# Patient Record
Sex: FEMALE | Race: OTHER | Employment: FULL TIME | URBAN - METROPOLITAN AREA
[De-identification: names, ages, dates, MRNs, and addresses within clinical notes are randomized per-mention and may not be internally consistent; named-entity substitution may affect disease eponyms.]

---

## 2017-04-07 ENCOUNTER — HOSPITAL ENCOUNTER (OUTPATIENT)
Dept: ULTRASOUND IMAGING | Age: 42
Discharge: HOME | End: 2017-04-07

## 2017-04-07 DIAGNOSIS — K76.0 FATTY LIVER: ICD-10-CM

## 2017-04-07 PROCEDURE — 76705 ECHO EXAM OF ABDOMEN: CPT

## 2018-02-17 ENCOUNTER — HOSPITAL ENCOUNTER (OUTPATIENT)
Dept: WOMENS IMAGING | Age: 43
Discharge: HOME | End: 2018-02-19
Payer: COMMERCIAL

## 2018-02-17 DIAGNOSIS — Z12.31 ENCOUNTER FOR SCREENING MAMMOGRAM FOR BREAST CANCER: ICD-10-CM

## 2018-02-17 PROCEDURE — 77067 SCR MAMMO BI INCL CAD: CPT

## 2018-02-24 LAB — DIABETIC RETINOPATHY: NORMAL

## 2018-02-26 ENCOUNTER — OFFICE VISIT (OUTPATIENT)
Dept: FAMILY MEDICINE CLINIC | Age: 43
End: 2018-02-26
Payer: COMMERCIAL

## 2018-02-26 VITALS
TEMPERATURE: 97.6 F | OXYGEN SATURATION: 98 % | WEIGHT: 266 LBS | HEIGHT: 64 IN | HEART RATE: 76 BPM | SYSTOLIC BLOOD PRESSURE: 136 MMHG | DIASTOLIC BLOOD PRESSURE: 82 MMHG | BODY MASS INDEX: 45.41 KG/M2 | RESPIRATION RATE: 14 BRPM

## 2018-02-26 DIAGNOSIS — E55.9 VITAMIN D DEFICIENCY: Chronic | ICD-10-CM

## 2018-02-26 DIAGNOSIS — Z23 NEED FOR STREPTOCOCCUS PNEUMONIAE AND INFLUENZA VACCINATION: ICD-10-CM

## 2018-02-26 DIAGNOSIS — Z79.899 HIGH RISK MEDICATION USE: Chronic | ICD-10-CM

## 2018-02-26 DIAGNOSIS — E11.9 DIABETIC EYE EXAM (HCC): ICD-10-CM

## 2018-02-26 DIAGNOSIS — E78.00 PURE HYPERCHOLESTEROLEMIA: ICD-10-CM

## 2018-02-26 DIAGNOSIS — G47.33 OBSTRUCTIVE SLEEP APNEA SYNDROME: ICD-10-CM

## 2018-02-26 DIAGNOSIS — Z11.4 SCREENING FOR HIV (HUMAN IMMUNODEFICIENCY VIRUS): ICD-10-CM

## 2018-02-26 DIAGNOSIS — E11.9 ENCOUNTER FOR DIABETIC FOOT EXAM (HCC): ICD-10-CM

## 2018-02-26 DIAGNOSIS — E66.01 MORBID OBESITY DUE TO EXCESS CALORIES (HCC): ICD-10-CM

## 2018-02-26 DIAGNOSIS — Z01.00 DIABETIC EYE EXAM (HCC): ICD-10-CM

## 2018-02-26 PROBLEM — E78.5 HYPERLIPIDEMIA: Status: ACTIVE | Noted: 2018-02-26

## 2018-02-26 PROCEDURE — G8427 DOCREV CUR MEDS BY ELIG CLIN: HCPCS | Performed by: FAMILY MEDICINE

## 2018-02-26 PROCEDURE — 3046F HEMOGLOBIN A1C LEVEL >9.0%: CPT | Performed by: FAMILY MEDICINE

## 2018-02-26 PROCEDURE — 93000 ELECTROCARDIOGRAM COMPLETE: CPT | Performed by: FAMILY MEDICINE

## 2018-02-26 PROCEDURE — G8417 CALC BMI ABV UP PARAM F/U: HCPCS | Performed by: FAMILY MEDICINE

## 2018-02-26 PROCEDURE — 1036F TOBACCO NON-USER: CPT | Performed by: FAMILY MEDICINE

## 2018-02-26 PROCEDURE — 90471 IMMUNIZATION ADMIN: CPT | Performed by: FAMILY MEDICINE

## 2018-02-26 PROCEDURE — 90688 IIV4 VACCINE SPLT 0.5 ML IM: CPT | Performed by: FAMILY MEDICINE

## 2018-02-26 PROCEDURE — 99204 OFFICE O/P NEW MOD 45 MIN: CPT | Performed by: FAMILY MEDICINE

## 2018-02-26 PROCEDURE — 90732 PPSV23 VACC 2 YRS+ SUBQ/IM: CPT | Performed by: FAMILY MEDICINE

## 2018-02-26 PROCEDURE — G8484 FLU IMMUNIZE NO ADMIN: HCPCS | Performed by: FAMILY MEDICINE

## 2018-02-26 PROCEDURE — 90472 IMMUNIZATION ADMIN EACH ADD: CPT | Performed by: FAMILY MEDICINE

## 2018-02-26 RX ORDER — FENOFIBRATE 160 MG/1
160 TABLET ORAL DAILY
Qty: 30 TABLET | Refills: 5 | Status: SHIPPED | OUTPATIENT
Start: 2018-02-26 | End: 2018-02-26 | Stop reason: SDUPTHER

## 2018-02-26 RX ORDER — ERGOCALCIFEROL 1.25 MG/1
50000 CAPSULE ORAL WEEKLY
Qty: 4 CAPSULE | Refills: 5 | Status: SHIPPED | OUTPATIENT
Start: 2018-02-26 | End: 2018-02-26 | Stop reason: SDUPTHER

## 2018-02-26 RX ORDER — FENOFIBRATE 160 MG/1
160 TABLET ORAL DAILY
Qty: 30 TABLET | Refills: 5 | Status: SHIPPED | OUTPATIENT
Start: 2018-02-26 | End: 2018-07-10 | Stop reason: SDUPTHER

## 2018-02-26 RX ORDER — AMITRIPTYLINE HYDROCHLORIDE 50 MG/1
50 TABLET, FILM COATED ORAL NIGHTLY
Qty: 30 TABLET | Refills: 2 | Status: SHIPPED | OUTPATIENT
Start: 2018-02-26 | End: 2018-07-05 | Stop reason: SDUPTHER

## 2018-02-26 RX ORDER — AMITRIPTYLINE HYDROCHLORIDE 50 MG/1
50 TABLET, FILM COATED ORAL NIGHTLY
Qty: 30 TABLET | Refills: 2 | Status: SHIPPED | OUTPATIENT
Start: 2018-02-26 | End: 2018-02-26 | Stop reason: SDUPTHER

## 2018-02-26 RX ORDER — ERGOCALCIFEROL 1.25 MG/1
50000 CAPSULE ORAL WEEKLY
Qty: 4 CAPSULE | Refills: 5 | Status: SHIPPED | OUTPATIENT
Start: 2018-02-26 | End: 2018-10-16 | Stop reason: SDUPTHER

## 2018-02-26 ASSESSMENT — PATIENT HEALTH QUESTIONNAIRE - PHQ9
1. LITTLE INTEREST OR PLEASURE IN DOING THINGS: 0
SUM OF ALL RESPONSES TO PHQ9 QUESTIONS 1 & 2: 0
SUM OF ALL RESPONSES TO PHQ QUESTIONS 1-9: 0
2. FEELING DOWN, DEPRESSED OR HOPELESS: 0

## 2018-02-26 NOTE — PROGRESS NOTES
fenofibrate 160 MG tablet   3. Morbid obesity due to excess calories (HCC)  Comprehensive Metabolic Panel   4. Need for Streptococcus pneumoniae and influenza vaccination  INFLUENZA, QUADV, 3 YRS AND OLDER, IM, MDV, 0.5ML (FLUZONE QUADV)    Pneumococcal polysaccharide vaccine 23-valent greater than or equal to 3yo subcutaneous/IM   5. Screening for HIV (human immunodeficiency virus)     6. Diabetic eye exam ([facility] [address]   DIABETES EYE EXAM   7. Encounter for diabetic foot exam [facility])  DENNISE Pabon   8. High risk medication use  EKG 12 lead unit performed   9. Obstructive sleep apnea syndrome  Sleep Study with PAP Titration   10. Vitamin D deficiency  vitamin D (ERGOCALCIFEROL) 73757 units CAPS capsule    DISCONTINUED: vitamin D (ERGOCALCIFEROL) 32923 units CAPS capsule     1. Diabetes - stable, fair control - continue current meds; up to date on immunizations, optho and referred podiatry; reviewed importance of med compliance, low carb diet, exercise and lab monitoring. 2. HLD - stable, fair control - continue current meds; reviewed importance of med compliance, low carb diet, exercise and lab monitoring. 3. Obesity - discussed bariatric surgery; patient to self-refer to Dr. Vianney Clinton for weight loss meds; reviewed low carb diet    4. JONATHAN - referred for titration study and subsequent pulm mgt    5. Vitamin D deficiency -refilled vitamin d    Reviewed with the patient: current clinical status, medications, activities and diet.      Side effects, adverse effects of the medication prescribed today, as well as treatment plan/ rationale and result expectations have been discussed with the patient who expresses understanding and desires to proceed.     Close follow up to evaluate treatment results and for coordination of care. I have reviewed the patient's medical history in detail and updated the computerized patient record.         Orders Placed This Encounter   Procedures    INFLUENZA, QUADV, 3 YRS AND OLDER, IM, MDV, 0.5ML (FLUZONE QUADV)    Pneumococcal polysaccharide vaccine 23-valent greater than or equal to 3yo subcutaneous/IM    Microalbumin / Creatinine Urine Ratio     Standing Status:   Future     Standing Expiration Date:   2019    Lipid Panel     Standing Status:   Future     Standing Expiration Date:   2019     Order Specific Question:   Is Patient Fasting?/# of Hours     Answer:   8 hours    Hemoglobin A1C     Standing Status:   Future     Standing Expiration Date:   2019    Comprehensive Metabolic Panel     Standing Status:   Future     Standing Expiration Date:   2019    CBC With Auto Differential     Standing Status:   Future     Standing Expiration Date:   2019   Angelique Stuart     Referral Priority:   Routine     Referral Type:   Consult for Advice and Opinion     Referral Reason:   Specialty Services Required     Referred to Provider:   Yoana Cobos DPM     Requested Specialty:   Podiatry     Number of Visits Requested:   1    EKG 12 lead unit performed     Order Specific Question:   Reason for Exam?     Answer:    Other    HM DIABETES EYE EXAM    Sleep Study with PAP Titration     Standing Status:   Future     Standing Expiration Date:   2018     Order Specific Question:   Sleep Study Titration Type     Answer:   CPAP     Order Specific Question:   Location For Sleep Study     Answer:   [place]     Order Specific Question:   Select Sleep Lab Location     Answer:   [facility]     Orders Placed This Encounter   Medications    DISCONTD: insulin glargine (LANTUS) 100 UNIT/ML injection pen     Si units twice a day     Dispense:  5 pen     Refill:  5    DISCONTD: insulin aspart (NOVOLOG) 100 UNIT/ML injection pen     Si units at each meals     Dispense:  5 pen     Refill:  5    DISCONTD: Liraglutide (VICTOZA) 18 MG/3ML SOPN SC injection     Sig: Inject 1.8 mg into the skin daily     Dispense:  2 pen     Refill:  5  aspirin 81 MG tablet Reorder    fenofibrate 160 MG tablet Reorder    vitamin D (ERGOCALCIFEROL) 24490 UNITS CAPS capsule Reorder    insulin glargine (LANTUS) 100 UNIT/ML injection pen Reorder    insulin aspart (NOVOLOG) 100 UNIT/ML injection pen Reorder    Liraglutide (VICTOZA) 18 MG/3ML SOPN SC injection Reorder    amitriptyline (ELAVIL) 50 MG tablet Reorder    aspirin 81 MG tablet Reorder    fenofibrate 160 MG tablet Reorder    vitamin D (ERGOCALCIFEROL) 09294 units CAPS capsule Reorder     Return in about 3 months (around 5/26/2018) for DM lipids.         Controlled Substances Monitoring:                                Chance Reese MD

## 2018-02-27 ASSESSMENT — ENCOUNTER SYMPTOMS
DIARRHEA: 0
SHORTNESS OF BREATH: 0
TROUBLE SWALLOWING: 0
CONSTIPATION: 0
BLOOD IN STOOL: 0
COUGH: 0
CHEST TIGHTNESS: 0
VOMITING: 0
ABDOMINAL PAIN: 0
NAUSEA: 0
SORE THROAT: 0

## 2018-03-20 ENCOUNTER — TELEPHONE (OUTPATIENT)
Dept: FAMILY MEDICINE CLINIC | Age: 43
End: 2018-03-20

## 2018-03-20 DIAGNOSIS — G47.33 OBSTRUCTIVE SLEEP APNEA SYNDROME: Primary | ICD-10-CM

## 2018-04-13 ENCOUNTER — TELEPHONE (OUTPATIENT)
Dept: FAMILY MEDICINE CLINIC | Age: 43
End: 2018-04-13

## 2018-04-13 DIAGNOSIS — G47.33 OBSTRUCTIVE SLEEP APNEA SYNDROME: Primary | ICD-10-CM

## 2018-05-01 ENCOUNTER — OFFICE VISIT (OUTPATIENT)
Dept: ENDOCRINOLOGY | Age: 43
End: 2018-05-01
Payer: COMMERCIAL

## 2018-05-01 VITALS
WEIGHT: 264 LBS | SYSTOLIC BLOOD PRESSURE: 126 MMHG | HEIGHT: 64 IN | DIASTOLIC BLOOD PRESSURE: 88 MMHG | HEART RATE: 84 BPM | BODY MASS INDEX: 45.07 KG/M2

## 2018-05-01 DIAGNOSIS — E66.01 MORBID OBESITY (HCC): ICD-10-CM

## 2018-05-01 LAB — GLUCOSE BLD-MCNC: 212 MG/DL

## 2018-05-01 PROCEDURE — 99214 OFFICE O/P EST MOD 30 MIN: CPT | Performed by: INTERNAL MEDICINE

## 2018-05-01 PROCEDURE — G8417 CALC BMI ABV UP PARAM F/U: HCPCS | Performed by: INTERNAL MEDICINE

## 2018-05-01 PROCEDURE — G8427 DOCREV CUR MEDS BY ELIG CLIN: HCPCS | Performed by: INTERNAL MEDICINE

## 2018-05-01 PROCEDURE — 1036F TOBACCO NON-USER: CPT | Performed by: INTERNAL MEDICINE

## 2018-05-01 PROCEDURE — 2022F DILAT RTA XM EVC RTNOPTHY: CPT | Performed by: INTERNAL MEDICINE

## 2018-05-01 PROCEDURE — 82962 GLUCOSE BLOOD TEST: CPT | Performed by: INTERNAL MEDICINE

## 2018-05-01 PROCEDURE — 3046F HEMOGLOBIN A1C LEVEL >9.0%: CPT | Performed by: INTERNAL MEDICINE

## 2018-05-01 RX ORDER — LANCETS 30 GAUGE
1 EACH MISCELLANEOUS 3 TIMES DAILY
Qty: 100 EACH | Refills: 6 | Status: SHIPPED | OUTPATIENT
Start: 2018-05-01 | End: 2018-08-30 | Stop reason: SDUPTHER

## 2018-05-01 RX ORDER — GLUCOSAMINE HCL/CHONDROITIN SU 500-400 MG
1 CAPSULE ORAL 3 TIMES DAILY
Qty: 100 STRIP | Refills: 6 | Status: SHIPPED | OUTPATIENT
Start: 2018-05-01 | End: 2018-08-30 | Stop reason: SDUPTHER

## 2018-05-14 ENCOUNTER — APPOINTMENT (OUTPATIENT)
Dept: CT IMAGING | Age: 43
End: 2018-05-14
Payer: COMMERCIAL

## 2018-05-14 ENCOUNTER — HOSPITAL ENCOUNTER (EMERGENCY)
Age: 43
Discharge: HOME | End: 2018-05-14
Payer: COMMERCIAL

## 2018-05-14 VITALS
BODY MASS INDEX: 44.39 KG/M2 | DIASTOLIC BLOOD PRESSURE: 81 MMHG | TEMPERATURE: 97.8 F | HEART RATE: 88 BPM | SYSTOLIC BLOOD PRESSURE: 138 MMHG | RESPIRATION RATE: 18 BRPM | OXYGEN SATURATION: 97 % | HEIGHT: 64 IN | WEIGHT: 260 LBS

## 2018-05-14 DIAGNOSIS — N30.00 ACUTE CYSTITIS WITHOUT HEMATURIA: Primary | ICD-10-CM

## 2018-05-14 LAB
ALBUMIN SERPL-MCNC: 4.2 G/DL (ref 3.9–4.9)
ALP BLD-CCNC: 63 U/L (ref 40–130)
ALT SERPL-CCNC: 70 U/L (ref 0–33)
ANION GAP SERPL CALCULATED.3IONS-SCNC: 16 MEQ/L (ref 7–13)
AST SERPL-CCNC: 46 U/L (ref 0–35)
BACTERIA: ABNORMAL /HPF
BASOPHILS ABSOLUTE: 0 K/UL (ref 0–0.2)
BASOPHILS RELATIVE PERCENT: 0.3 %
BILIRUB SERPL-MCNC: 0.5 MG/DL (ref 0–1.2)
BILIRUBIN URINE: NEGATIVE
BLOOD, URINE: NEGATIVE
BUN BLDV-MCNC: 14 MG/DL (ref 6–20)
CALCIUM SERPL-MCNC: 9.4 MG/DL (ref 8.6–10.2)
CHLORIDE BLD-SCNC: 98 MEQ/L (ref 98–107)
CHP ED QC CHECK: YES
CLARITY: ABNORMAL
CO2: 24 MEQ/L (ref 22–29)
COLOR: YELLOW
CREAT SERPL-MCNC: 0.58 MG/DL (ref 0.5–0.9)
EOSINOPHILS ABSOLUTE: 0.1 K/UL (ref 0–0.7)
EOSINOPHILS RELATIVE PERCENT: 0.6 %
EPITHELIAL CELLS, UA: ABNORMAL /HPF
GFR AFRICAN AMERICAN: >60
GFR NON-AFRICAN AMERICAN: >60
GLOBULIN: 3.4 G/DL (ref 2.3–3.5)
GLUCOSE BLD-MCNC: 169 MG/DL (ref 74–109)
GLUCOSE URINE: NEGATIVE MG/DL
HCT VFR BLD CALC: 42.9 % (ref 37–47)
HEMOGLOBIN: 14.6 G/DL (ref 12–16)
KETONES, URINE: NEGATIVE MG/DL
LEUKOCYTE ESTERASE, URINE: ABNORMAL
LIPASE: 35 U/L (ref 13–60)
LYMPHOCYTES ABSOLUTE: 2.3 K/UL (ref 1–4.8)
LYMPHOCYTES RELATIVE PERCENT: 27.2 %
MCH RBC QN AUTO: 27.5 PG (ref 27–31.3)
MCHC RBC AUTO-ENTMCNC: 34.1 % (ref 33–37)
MCV RBC AUTO: 80.8 FL (ref 82–100)
MONOCYTES ABSOLUTE: 0.6 K/UL (ref 0.2–0.8)
MONOCYTES RELATIVE PERCENT: 6.6 %
MUCUS: PRESENT
NEUTROPHILS ABSOLUTE: 5.4 K/UL (ref 1.4–6.5)
NEUTROPHILS RELATIVE PERCENT: 65.3 %
NITRITE, URINE: NEGATIVE
PDW BLD-RTO: 13.7 % (ref 11.5–14.5)
PH UA: 5 (ref 5–9)
PLATELET # BLD: 205 K/UL (ref 130–400)
POTASSIUM SERPL-SCNC: 4.1 MEQ/L (ref 3.5–5.1)
PREGNANCY TEST URINE, POC: NEGATIVE
PROTEIN UA: 30 MG/DL
RBC # BLD: 5.32 M/UL (ref 4.2–5.4)
RBC UA: ABNORMAL /HPF (ref 0–2)
RENAL EPITHELIAL, UA: ABNORMAL /HPF
SODIUM BLD-SCNC: 138 MEQ/L (ref 132–144)
SPECIFIC GRAVITY UA: 1.02 (ref 1–1.03)
TOTAL PROTEIN: 7.6 G/DL (ref 6.4–8.1)
URINE REFLEX TO CULTURE: YES
UROBILINOGEN, URINE: 0.2 E.U./DL
WBC # BLD: 8.3 K/UL (ref 4.8–10.8)
WBC UA: ABNORMAL /HPF (ref 0–5)

## 2018-05-14 PROCEDURE — 99284 EMERGENCY DEPT VISIT MOD MDM: CPT

## 2018-05-14 PROCEDURE — 87086 URINE CULTURE/COLONY COUNT: CPT

## 2018-05-14 PROCEDURE — 85025 COMPLETE CBC W/AUTO DIFF WBC: CPT

## 2018-05-14 PROCEDURE — 6360000002 HC RX W HCPCS: Performed by: PHYSICIAN ASSISTANT

## 2018-05-14 PROCEDURE — 6360000004 HC RX CONTRAST MEDICATION: Performed by: PHYSICIAN ASSISTANT

## 2018-05-14 PROCEDURE — 81001 URINALYSIS AUTO W/SCOPE: CPT

## 2018-05-14 PROCEDURE — 2580000003 HC RX 258: Performed by: PHYSICIAN ASSISTANT

## 2018-05-14 PROCEDURE — 96374 THER/PROPH/DIAG INJ IV PUSH: CPT

## 2018-05-14 PROCEDURE — 74177 CT ABD & PELVIS W/CONTRAST: CPT

## 2018-05-14 PROCEDURE — 83690 ASSAY OF LIPASE: CPT

## 2018-05-14 PROCEDURE — 36415 COLL VENOUS BLD VENIPUNCTURE: CPT

## 2018-05-14 PROCEDURE — 80053 COMPREHEN METABOLIC PANEL: CPT

## 2018-05-14 PROCEDURE — 96375 TX/PRO/DX INJ NEW DRUG ADDON: CPT

## 2018-05-14 RX ORDER — 0.9 % SODIUM CHLORIDE 0.9 %
1000 INTRAVENOUS SOLUTION INTRAVENOUS ONCE
Status: COMPLETED | OUTPATIENT
Start: 2018-05-14 | End: 2018-05-14

## 2018-05-14 RX ORDER — SULFAMETHOXAZOLE AND TRIMETHOPRIM 800; 160 MG/1; MG/1
1 TABLET ORAL 2 TIMES DAILY
Qty: 6 TABLET | Refills: 0 | Status: SHIPPED | OUTPATIENT
Start: 2018-05-14 | End: 2018-05-17

## 2018-05-14 RX ORDER — MORPHINE SULFATE 2 MG/ML
4 INJECTION, SOLUTION INTRAMUSCULAR; INTRAVENOUS ONCE
Status: COMPLETED | OUTPATIENT
Start: 2018-05-14 | End: 2018-05-14

## 2018-05-14 RX ORDER — ONDANSETRON 2 MG/ML
4 INJECTION INTRAMUSCULAR; INTRAVENOUS ONCE
Status: COMPLETED | OUTPATIENT
Start: 2018-05-14 | End: 2018-05-14

## 2018-05-14 RX ORDER — KETOROLAC TROMETHAMINE 30 MG/ML
30 INJECTION, SOLUTION INTRAMUSCULAR; INTRAVENOUS ONCE
Status: COMPLETED | OUTPATIENT
Start: 2018-05-14 | End: 2018-05-14

## 2018-05-14 RX ADMIN — SODIUM CHLORIDE 1000 ML: 9 INJECTION, SOLUTION INTRAVENOUS at 01:26

## 2018-05-14 RX ADMIN — MORPHINE SULFATE 4 MG: 2 INJECTION, SOLUTION INTRAMUSCULAR; INTRAVENOUS at 01:27

## 2018-05-14 RX ADMIN — ONDANSETRON 4 MG: 2 INJECTION INTRAMUSCULAR; INTRAVENOUS at 02:27

## 2018-05-14 RX ADMIN — KETOROLAC TROMETHAMINE 30 MG: 30 INJECTION, SOLUTION INTRAMUSCULAR; INTRAVENOUS at 01:27

## 2018-05-14 RX ADMIN — IOPAMIDOL 100 ML: 755 INJECTION, SOLUTION INTRAVENOUS at 02:21

## 2018-05-14 ASSESSMENT — ENCOUNTER SYMPTOMS
NAUSEA: 1
COUGH: 0
SHORTNESS OF BREATH: 0
DIARRHEA: 0
VOMITING: 1
ABDOMINAL PAIN: 1

## 2018-05-14 ASSESSMENT — PAIN SCALES - GENERAL
PAINLEVEL_OUTOF10: 10
PAINLEVEL_OUTOF10: 10
PAINLEVEL_OUTOF10: 8

## 2018-05-14 ASSESSMENT — PAIN DESCRIPTION - PAIN TYPE: TYPE: ACUTE PAIN

## 2018-05-14 ASSESSMENT — PAIN DESCRIPTION - ORIENTATION: ORIENTATION: LEFT;UPPER

## 2018-05-14 ASSESSMENT — PAIN DESCRIPTION - FREQUENCY: FREQUENCY: CONTINUOUS

## 2018-05-14 ASSESSMENT — PAIN DESCRIPTION - LOCATION: LOCATION: ABDOMEN

## 2018-05-14 ASSESSMENT — PAIN DESCRIPTION - DESCRIPTORS: DESCRIPTORS: SHARP

## 2018-05-14 ASSESSMENT — PAIN DESCRIPTION - PROGRESSION: CLINICAL_PROGRESSION: GRADUALLY IMPROVING

## 2018-05-15 LAB — URINE CULTURE, ROUTINE: NORMAL

## 2018-05-28 ENCOUNTER — HOSPITAL ENCOUNTER (EMERGENCY)
Age: 43
Discharge: HOME | End: 2018-05-28
Attending: EMERGENCY MEDICINE
Payer: COMMERCIAL

## 2018-05-28 VITALS
WEIGHT: 260 LBS | BODY MASS INDEX: 44.39 KG/M2 | SYSTOLIC BLOOD PRESSURE: 145 MMHG | OXYGEN SATURATION: 97 % | DIASTOLIC BLOOD PRESSURE: 93 MMHG | RESPIRATION RATE: 18 BRPM | HEART RATE: 80 BPM | HEIGHT: 64 IN | TEMPERATURE: 98.1 F

## 2018-05-28 DIAGNOSIS — J02.8 PHARYNGITIS DUE TO OTHER ORGANISM: Primary | ICD-10-CM

## 2018-05-28 LAB
EKG ATRIAL RATE: 79 BPM
EKG P AXIS: 14 DEGREES
EKG P-R INTERVAL: 142 MS
EKG Q-T INTERVAL: 384 MS
EKG QRS DURATION: 82 MS
EKG QTC CALCULATION (BAZETT): 440 MS
EKG R AXIS: -10 DEGREES
EKG T AXIS: 9 DEGREES
EKG VENTRICULAR RATE: 79 BPM
S PYO AG THROAT QL: NEGATIVE

## 2018-05-28 PROCEDURE — 96372 THER/PROPH/DIAG INJ SC/IM: CPT

## 2018-05-28 PROCEDURE — 93005 ELECTROCARDIOGRAM TRACING: CPT

## 2018-05-28 PROCEDURE — 87081 CULTURE SCREEN ONLY: CPT

## 2018-05-28 PROCEDURE — 6360000002 HC RX W HCPCS: Performed by: EMERGENCY MEDICINE

## 2018-05-28 PROCEDURE — 87880 STREP A ASSAY W/OPTIC: CPT

## 2018-05-28 PROCEDURE — 99285 EMERGENCY DEPT VISIT HI MDM: CPT

## 2018-05-28 RX ORDER — PREDNISONE 50 MG/1
50 TABLET ORAL DAILY
Qty: 4 TABLET | Refills: 0 | Status: SHIPPED | OUTPATIENT
Start: 2018-05-28 | End: 2018-06-01

## 2018-05-28 RX ORDER — CETIRIZINE HYDROCHLORIDE 10 MG/1
10 TABLET ORAL DAILY
Qty: 7 TABLET | Refills: 0 | Status: SHIPPED | OUTPATIENT
Start: 2018-05-28 | End: 2018-07-10

## 2018-05-28 RX ORDER — KETOROLAC TROMETHAMINE 30 MG/ML
30 INJECTION, SOLUTION INTRAMUSCULAR; INTRAVENOUS ONCE
Status: COMPLETED | OUTPATIENT
Start: 2018-05-28 | End: 2018-05-28

## 2018-05-28 RX ORDER — DEXAMETHASONE SODIUM PHOSPHATE 10 MG/ML
10 INJECTION INTRAMUSCULAR; INTRAVENOUS ONCE
Status: COMPLETED | OUTPATIENT
Start: 2018-05-28 | End: 2018-05-28

## 2018-05-28 RX ADMIN — KETOROLAC TROMETHAMINE 30 MG: 30 INJECTION, SOLUTION INTRAMUSCULAR at 11:35

## 2018-05-28 RX ADMIN — DEXAMETHASONE SODIUM PHOSPHATE 10 MG: 10 INJECTION INTRAMUSCULAR; INTRAVENOUS at 11:35

## 2018-05-28 ASSESSMENT — ENCOUNTER SYMPTOMS
COUGH: 0
NAUSEA: 0
SHORTNESS OF BREATH: 0
VOMITING: 0
SORE THROAT: 1
DIARRHEA: 0

## 2018-05-28 ASSESSMENT — PAIN SCALES - GENERAL: PAINLEVEL_OUTOF10: 8

## 2018-05-29 ENCOUNTER — OFFICE VISIT (OUTPATIENT)
Dept: FAMILY MEDICINE CLINIC | Age: 43
End: 2018-05-29
Payer: COMMERCIAL

## 2018-05-29 VITALS — DIASTOLIC BLOOD PRESSURE: 86 MMHG | SYSTOLIC BLOOD PRESSURE: 158 MMHG

## 2018-05-29 DIAGNOSIS — E78.00 PURE HYPERCHOLESTEROLEMIA: ICD-10-CM

## 2018-05-29 DIAGNOSIS — G47.33 OBSTRUCTIVE SLEEP APNEA SYNDROME: ICD-10-CM

## 2018-05-29 DIAGNOSIS — M79.7 FIBROMYALGIA: Chronic | ICD-10-CM

## 2018-05-29 DIAGNOSIS — E01.0 THYROMEGALY: Chronic | ICD-10-CM

## 2018-05-29 DIAGNOSIS — I10 ESSENTIAL HYPERTENSION: Chronic | ICD-10-CM

## 2018-05-29 DIAGNOSIS — E66.01 MORBID OBESITY DUE TO EXCESS CALORIES (HCC): ICD-10-CM

## 2018-05-29 PROCEDURE — 1036F TOBACCO NON-USER: CPT | Performed by: FAMILY MEDICINE

## 2018-05-29 PROCEDURE — 3046F HEMOGLOBIN A1C LEVEL >9.0%: CPT | Performed by: FAMILY MEDICINE

## 2018-05-29 PROCEDURE — 2022F DILAT RTA XM EVC RTNOPTHY: CPT | Performed by: FAMILY MEDICINE

## 2018-05-29 PROCEDURE — G8417 CALC BMI ABV UP PARAM F/U: HCPCS | Performed by: FAMILY MEDICINE

## 2018-05-29 PROCEDURE — 99214 OFFICE O/P EST MOD 30 MIN: CPT | Performed by: FAMILY MEDICINE

## 2018-05-29 PROCEDURE — G8427 DOCREV CUR MEDS BY ELIG CLIN: HCPCS | Performed by: FAMILY MEDICINE

## 2018-05-29 RX ORDER — ASPIRIN 81 MG/1
TABLET, COATED ORAL
Refills: 5 | COMMUNITY
Start: 2018-02-26 | End: 2018-07-10 | Stop reason: SDUPTHER

## 2018-05-29 RX ORDER — LISINOPRIL 20 MG/1
20 TABLET ORAL DAILY
Qty: 30 TABLET | Refills: 5 | Status: SHIPPED | OUTPATIENT
Start: 2018-05-29 | End: 2018-05-30 | Stop reason: SDUPTHER

## 2018-05-29 RX ORDER — BLOOD-GLUCOSE METER
EACH MISCELLANEOUS
Refills: 0 | COMMUNITY
Start: 2018-05-01

## 2018-05-29 RX ORDER — ATORVASTATIN CALCIUM 40 MG/1
40 TABLET, FILM COATED ORAL DAILY
Qty: 30 TABLET | Refills: 5 | Status: SHIPPED | OUTPATIENT
Start: 2018-05-29 | End: 2018-05-30 | Stop reason: SDUPTHER

## 2018-05-30 ENCOUNTER — TELEPHONE (OUTPATIENT)
Dept: FAMILY MEDICINE CLINIC | Age: 43
End: 2018-05-30

## 2018-05-30 ENCOUNTER — HOSPITAL ENCOUNTER (EMERGENCY)
Age: 43
Discharge: HOME | End: 2018-05-31
Payer: COMMERCIAL

## 2018-05-30 ENCOUNTER — APPOINTMENT (OUTPATIENT)
Dept: CT IMAGING | Age: 43
End: 2018-05-30
Payer: COMMERCIAL

## 2018-05-30 DIAGNOSIS — R73.9 HYPERGLYCEMIA: Primary | ICD-10-CM

## 2018-05-30 DIAGNOSIS — R51.9 ACUTE NONINTRACTABLE HEADACHE, UNSPECIFIED HEADACHE TYPE: ICD-10-CM

## 2018-05-30 DIAGNOSIS — I10 ESSENTIAL HYPERTENSION: Primary | Chronic | ICD-10-CM

## 2018-05-30 LAB
ALBUMIN SERPL-MCNC: 3.9 G/DL (ref 3.9–4.9)
ALP BLD-CCNC: 59 U/L (ref 40–130)
ALT SERPL-CCNC: 79 U/L (ref 0–33)
ANION GAP SERPL CALCULATED.3IONS-SCNC: 13 MEQ/L (ref 7–13)
AST SERPL-CCNC: 73 U/L (ref 0–35)
BASOPHILS ABSOLUTE: 0.1 K/UL (ref 0–0.2)
BASOPHILS RELATIVE PERCENT: 0.9 %
BILIRUB SERPL-MCNC: 0.3 MG/DL (ref 0–1.2)
BUN BLDV-MCNC: 24 MG/DL (ref 6–20)
CALCIUM SERPL-MCNC: 9.6 MG/DL (ref 8.6–10.2)
CHLORIDE BLD-SCNC: 100 MEQ/L (ref 98–107)
CHP ED QC CHECK: NORMAL
CHP ED QC CHECK: NORMAL
CO2: 24 MEQ/L (ref 22–29)
CREAT SERPL-MCNC: 0.71 MG/DL (ref 0.5–0.9)
EOSINOPHILS ABSOLUTE: 0.2 K/UL (ref 0–0.7)
EOSINOPHILS RELATIVE PERCENT: 1.8 %
GFR AFRICAN AMERICAN: >60
GFR NON-AFRICAN AMERICAN: >60
GLOBULIN: 3.1 G/DL (ref 2.3–3.5)
GLUCOSE BLD-MCNC: 227 MG/DL (ref 74–109)
GLUCOSE BLD-MCNC: 289 MG/DL
GLUCOSE BLD-MCNC: 289 MG/DL (ref 60–115)
HCT VFR BLD CALC: 41.9 % (ref 37–47)
HEMOGLOBIN: 13.9 G/DL (ref 12–16)
LYMPHOCYTES ABSOLUTE: 3.1 K/UL (ref 1–4.8)
LYMPHOCYTES RELATIVE PERCENT: 33.2 %
MCH RBC QN AUTO: 27 PG (ref 27–31.3)
MCHC RBC AUTO-ENTMCNC: 33.3 % (ref 33–37)
MCV RBC AUTO: 81.2 FL (ref 82–100)
MONOCYTES ABSOLUTE: 0.7 K/UL (ref 0.2–0.8)
MONOCYTES RELATIVE PERCENT: 7.9 %
NEUTROPHILS ABSOLUTE: 5.3 K/UL (ref 1.4–6.5)
NEUTROPHILS RELATIVE PERCENT: 56.2 %
PDW BLD-RTO: 14 % (ref 11.5–14.5)
PERFORMED ON: ABNORMAL
PLATELET # BLD: 209 K/UL (ref 130–400)
POTASSIUM SERPL-SCNC: 4 MEQ/L (ref 3.5–5.1)
PREGNANCY TEST URINE, POC: NEGATIVE
RBC # BLD: 5.15 M/UL (ref 4.2–5.4)
S PYO AG THROAT QL: NEGATIVE
S PYO THROAT QL CULT: NORMAL
SODIUM BLD-SCNC: 137 MEQ/L (ref 132–144)
TOTAL PROTEIN: 7 G/DL (ref 6.4–8.1)
WBC # BLD: 9.4 K/UL (ref 4.8–10.8)

## 2018-05-30 PROCEDURE — 99285 EMERGENCY DEPT VISIT HI MDM: CPT

## 2018-05-30 PROCEDURE — 2580000003 HC RX 258: Performed by: NURSE PRACTITIONER

## 2018-05-30 PROCEDURE — 70450 CT HEAD/BRAIN W/O DYE: CPT

## 2018-05-30 PROCEDURE — 85025 COMPLETE CBC W/AUTO DIFF WBC: CPT

## 2018-05-30 PROCEDURE — 96374 THER/PROPH/DIAG INJ IV PUSH: CPT

## 2018-05-30 PROCEDURE — 93010 ELECTROCARDIOGRAM REPORT: CPT | Performed by: INTERNAL MEDICINE

## 2018-05-30 PROCEDURE — 80053 COMPREHEN METABOLIC PANEL: CPT

## 2018-05-30 PROCEDURE — 6360000002 HC RX W HCPCS: Performed by: NURSE PRACTITIONER

## 2018-05-30 PROCEDURE — 87880 STREP A ASSAY W/OPTIC: CPT

## 2018-05-30 PROCEDURE — 36415 COLL VENOUS BLD VENIPUNCTURE: CPT

## 2018-05-30 PROCEDURE — 96375 TX/PRO/DX INJ NEW DRUG ADDON: CPT

## 2018-05-30 PROCEDURE — 87081 CULTURE SCREEN ONLY: CPT

## 2018-05-30 RX ORDER — METOCLOPRAMIDE HYDROCHLORIDE 5 MG/ML
10 INJECTION INTRAMUSCULAR; INTRAVENOUS ONCE
Status: COMPLETED | OUTPATIENT
Start: 2018-05-30 | End: 2018-05-30

## 2018-05-30 RX ORDER — 0.9 % SODIUM CHLORIDE 0.9 %
1000 INTRAVENOUS SOLUTION INTRAVENOUS ONCE
Status: COMPLETED | OUTPATIENT
Start: 2018-05-30 | End: 2018-05-30

## 2018-05-30 RX ORDER — KETOROLAC TROMETHAMINE 30 MG/ML
30 INJECTION, SOLUTION INTRAMUSCULAR; INTRAVENOUS ONCE
Status: COMPLETED | OUTPATIENT
Start: 2018-05-30 | End: 2018-05-30

## 2018-05-30 RX ORDER — ATORVASTATIN CALCIUM 40 MG/1
40 TABLET, FILM COATED ORAL DAILY
Qty: 30 TABLET | Refills: 5 | Status: SHIPPED | OUTPATIENT
Start: 2018-05-30 | End: 2018-07-02 | Stop reason: SDUPTHER

## 2018-05-30 RX ORDER — LISINOPRIL 20 MG/1
20 TABLET ORAL DAILY
Qty: 30 TABLET | Refills: 5 | Status: SHIPPED | OUTPATIENT
Start: 2018-05-30 | End: 2018-07-02 | Stop reason: SDUPTHER

## 2018-05-30 RX ORDER — ONDANSETRON 2 MG/ML
4 INJECTION INTRAMUSCULAR; INTRAVENOUS ONCE
Status: COMPLETED | OUTPATIENT
Start: 2018-05-30 | End: 2018-05-30

## 2018-05-30 RX ORDER — DIPHENHYDRAMINE HYDROCHLORIDE 50 MG/ML
25 INJECTION INTRAMUSCULAR; INTRAVENOUS ONCE
Status: COMPLETED | OUTPATIENT
Start: 2018-05-30 | End: 2018-05-30

## 2018-05-30 RX ADMIN — METOCLOPRAMIDE 10 MG: 5 INJECTION, SOLUTION INTRAMUSCULAR; INTRAVENOUS at 21:53

## 2018-05-30 RX ADMIN — ONDANSETRON 4 MG: 2 INJECTION INTRAMUSCULAR; INTRAVENOUS at 21:53

## 2018-05-30 RX ADMIN — SODIUM CHLORIDE 1000 ML: 9 INJECTION, SOLUTION INTRAVENOUS at 21:54

## 2018-05-30 RX ADMIN — KETOROLAC TROMETHAMINE 30 MG: 30 INJECTION, SOLUTION INTRAMUSCULAR at 21:53

## 2018-05-30 RX ADMIN — DIPHENHYDRAMINE HYDROCHLORIDE 25 MG: 50 INJECTION, SOLUTION INTRAMUSCULAR; INTRAVENOUS at 21:53

## 2018-05-30 ASSESSMENT — PAIN SCALES - GENERAL
PAINLEVEL_OUTOF10: 8
PAINLEVEL_OUTOF10: 8

## 2018-05-30 ASSESSMENT — PAIN DESCRIPTION - LOCATION: LOCATION: EAR;NECK;HEAD

## 2018-05-31 ENCOUNTER — OFFICE VISIT (OUTPATIENT)
Dept: PULMONOLOGY | Age: 43
End: 2018-05-31
Payer: COMMERCIAL

## 2018-05-31 VITALS
WEIGHT: 269.8 LBS | OXYGEN SATURATION: 96 % | SYSTOLIC BLOOD PRESSURE: 132 MMHG | HEART RATE: 78 BPM | DIASTOLIC BLOOD PRESSURE: 70 MMHG | HEIGHT: 64 IN | BODY MASS INDEX: 46.06 KG/M2 | TEMPERATURE: 96.7 F

## 2018-05-31 VITALS
OXYGEN SATURATION: 96 % | HEIGHT: 64 IN | WEIGHT: 269 LBS | TEMPERATURE: 98.7 F | BODY MASS INDEX: 45.93 KG/M2 | DIASTOLIC BLOOD PRESSURE: 65 MMHG | SYSTOLIC BLOOD PRESSURE: 112 MMHG | HEART RATE: 85 BPM | RESPIRATION RATE: 20 BRPM

## 2018-05-31 DIAGNOSIS — E66.01 MORBID OBESITY (HCC): ICD-10-CM

## 2018-05-31 DIAGNOSIS — G47.33 OSA (OBSTRUCTIVE SLEEP APNEA): Primary | ICD-10-CM

## 2018-05-31 PROCEDURE — 99204 OFFICE O/P NEW MOD 45 MIN: CPT | Performed by: INTERNAL MEDICINE

## 2018-05-31 PROCEDURE — 1036F TOBACCO NON-USER: CPT | Performed by: INTERNAL MEDICINE

## 2018-05-31 PROCEDURE — G8427 DOCREV CUR MEDS BY ELIG CLIN: HCPCS | Performed by: INTERNAL MEDICINE

## 2018-05-31 PROCEDURE — G8417 CALC BMI ABV UP PARAM F/U: HCPCS | Performed by: INTERNAL MEDICINE

## 2018-05-31 ASSESSMENT — PAIN SCALES - GENERAL: PAINLEVEL_OUTOF10: 0

## 2018-05-31 ASSESSMENT — ENCOUNTER SYMPTOMS
SORE THROAT: 0
NAUSEA: 0
COUGH: 0
EYE DISCHARGE: 0
EYE ITCHING: 0
DIARRHEA: 0
SHORTNESS OF BREATH: 0
VOICE CHANGE: 0
VOMITING: 0
SINUS PRESSURE: 0
RHINORRHEA: 0
TROUBLE SWALLOWING: 0
ABDOMINAL PAIN: 0
WHEEZING: 0
CHEST TIGHTNESS: 0

## 2018-06-02 LAB — S PYO THROAT QL CULT: NORMAL

## 2018-06-05 ASSESSMENT — ENCOUNTER SYMPTOMS
NAUSEA: 0
SHORTNESS OF BREATH: 0
SORE THROAT: 0
DIARRHEA: 0
CONSTIPATION: 0
VOICE CHANGE: 0
COLOR CHANGE: 0
COUGH: 0
TROUBLE SWALLOWING: 0
ABDOMINAL PAIN: 0
VOMITING: 0
BACK PAIN: 0

## 2018-06-16 ENCOUNTER — HOSPITAL ENCOUNTER (OUTPATIENT)
Dept: ULTRASOUND IMAGING | Age: 43
Discharge: HOME | End: 2018-06-18
Payer: COMMERCIAL

## 2018-06-16 DIAGNOSIS — E01.0 THYROMEGALY: Chronic | ICD-10-CM

## 2018-06-16 PROCEDURE — 76536 US EXAM OF HEAD AND NECK: CPT

## 2018-06-25 ENCOUNTER — HOSPITAL ENCOUNTER (OUTPATIENT)
Dept: SLEEP CENTER | Age: 43
Discharge: HOME | End: 2018-06-27
Payer: COMMERCIAL

## 2018-06-25 PROCEDURE — G0399 HOME SLEEP TEST/TYPE 3 PORTA: HCPCS

## 2018-07-02 DIAGNOSIS — I10 ESSENTIAL HYPERTENSION: Chronic | ICD-10-CM

## 2018-07-03 RX ORDER — LISINOPRIL 20 MG/1
20 TABLET ORAL DAILY
Qty: 90 TABLET | Refills: 1 | Status: SHIPPED | OUTPATIENT
Start: 2018-07-03 | End: 2018-07-10 | Stop reason: SDUPTHER

## 2018-07-03 RX ORDER — ATORVASTATIN CALCIUM 40 MG/1
40 TABLET, FILM COATED ORAL DAILY
Qty: 90 TABLET | Refills: 1 | Status: SHIPPED | OUTPATIENT
Start: 2018-07-03 | End: 2018-07-10 | Stop reason: SDUPTHER

## 2018-07-05 RX ORDER — AMITRIPTYLINE HYDROCHLORIDE 50 MG/1
50 TABLET, FILM COATED ORAL NIGHTLY
Qty: 30 TABLET | Refills: 2 | Status: SHIPPED | OUTPATIENT
Start: 2018-07-05 | End: 2018-07-10 | Stop reason: SDUPTHER

## 2018-07-05 NOTE — TELEPHONE ENCOUNTER
Requested Prescriptions     Pending Prescriptions Disp Refills    amitriptyline (ELAVIL) 50 MG tablet 30 tablet 2     Sig: Take 1 tablet by mouth nightly

## 2018-07-10 ENCOUNTER — OFFICE VISIT (OUTPATIENT)
Dept: FAMILY MEDICINE CLINIC | Age: 43
End: 2018-07-10
Payer: COMMERCIAL

## 2018-07-10 VITALS
DIASTOLIC BLOOD PRESSURE: 70 MMHG | TEMPERATURE: 97.2 F | SYSTOLIC BLOOD PRESSURE: 130 MMHG | BODY MASS INDEX: 43.71 KG/M2 | OXYGEN SATURATION: 98 % | RESPIRATION RATE: 18 BRPM | WEIGHT: 256 LBS | HEIGHT: 64 IN | HEART RATE: 86 BPM

## 2018-07-10 DIAGNOSIS — I10 ESSENTIAL HYPERTENSION: Primary | Chronic | ICD-10-CM

## 2018-07-10 DIAGNOSIS — F41.9 ANXIETY: Chronic | ICD-10-CM

## 2018-07-10 DIAGNOSIS — M79.7 FIBROMYALGIA: Chronic | ICD-10-CM

## 2018-07-10 DIAGNOSIS — E78.00 PURE HYPERCHOLESTEROLEMIA: ICD-10-CM

## 2018-07-10 DIAGNOSIS — B37.31 VAGINAL CANDIDIASIS: Chronic | ICD-10-CM

## 2018-07-10 PROCEDURE — 1036F TOBACCO NON-USER: CPT | Performed by: FAMILY MEDICINE

## 2018-07-10 PROCEDURE — G8417 CALC BMI ABV UP PARAM F/U: HCPCS | Performed by: FAMILY MEDICINE

## 2018-07-10 PROCEDURE — G8427 DOCREV CUR MEDS BY ELIG CLIN: HCPCS | Performed by: FAMILY MEDICINE

## 2018-07-10 PROCEDURE — 2022F DILAT RTA XM EVC RTNOPTHY: CPT | Performed by: FAMILY MEDICINE

## 2018-07-10 PROCEDURE — 3046F HEMOGLOBIN A1C LEVEL >9.0%: CPT | Performed by: FAMILY MEDICINE

## 2018-07-10 PROCEDURE — 99214 OFFICE O/P EST MOD 30 MIN: CPT | Performed by: FAMILY MEDICINE

## 2018-07-10 RX ORDER — ATORVASTATIN CALCIUM 40 MG/1
40 TABLET, FILM COATED ORAL DAILY
Qty: 90 TABLET | Refills: 3 | Status: SHIPPED | OUTPATIENT
Start: 2018-07-10 | End: 2018-10-16 | Stop reason: SDUPTHER

## 2018-07-10 RX ORDER — GREEN TEA/HOODIA GORDONII 315-12.5MG
1 CAPSULE ORAL DAILY
Qty: 30 TABLET | Refills: 11 | Status: SHIPPED | OUTPATIENT
Start: 2018-07-10 | End: 2018-09-11

## 2018-07-10 RX ORDER — LISINOPRIL 20 MG/1
20 TABLET ORAL DAILY
Qty: 90 TABLET | Refills: 3 | Status: SHIPPED | OUTPATIENT
Start: 2018-07-10 | End: 2018-10-16 | Stop reason: SDUPTHER

## 2018-07-10 RX ORDER — FENOFIBRATE 160 MG/1
160 TABLET ORAL DAILY
Qty: 90 TABLET | Refills: 3 | Status: SHIPPED | OUTPATIENT
Start: 2018-07-10 | End: 2018-10-16 | Stop reason: SDUPTHER

## 2018-07-10 RX ORDER — FLUCONAZOLE 150 MG/1
TABLET ORAL
Qty: 2 TABLET | Refills: 2 | Status: SHIPPED | OUTPATIENT
Start: 2018-07-10 | End: 2018-09-11

## 2018-07-10 RX ORDER — FLUCONAZOLE 150 MG/1
TABLET ORAL
Qty: 2 TABLET | Refills: 2 | Status: SHIPPED | OUTPATIENT
Start: 2018-07-10 | End: 2018-07-10 | Stop reason: SDUPTHER

## 2018-07-10 RX ORDER — AMITRIPTYLINE HYDROCHLORIDE 50 MG/1
50 TABLET, FILM COATED ORAL NIGHTLY
Qty: 90 TABLET | Refills: 0 | Status: SHIPPED | OUTPATIENT
Start: 2018-07-10 | End: 2018-10-16 | Stop reason: DRUGHIGH

## 2018-07-10 NOTE — PROGRESS NOTES
UNIT/ML injection pen [[id number]]      insulin lispro (HUMALOG KWIKPEN) 100 UNIT/ML pen [[id number]]      Liraglutide (VICTOZA) 18 MG/3ML SOPN SC injection [[id number]]      Hemoglobin A1C [69260 Custom]   - Future Order         Vaginal candidiasis        Prescribed Diflucan and provided administration instructions. Prescribed probiotics. Advised that improved sugar control will reduce occurrence of vaginal can    fluconazole (DIFLUCAN) 150 MG tablet [[id number]]           Fibromyalgia        Has appointment with dermatology. Meanwhile Elavil.    amitriptyline (ELAVIL) 50 MG tablet [436]           Anxiety        Takes Elavil when necessary for insomnia related to anxiety state. Given when necessary administration no current indications for EKG. amitriptyline (ELAVIL) 50 MG tablet [436]           ORDERS WITHOUT AN ASSOCIATED DIAGNOSIS    Lactobacillus (PROBIOTIC ACIDOPHILUS) TABS [[id number]]            Reviewed with the patient: all disease processes, current clinical status, medications, activities and diet.      Side effects, adverse effects of the medication prescribed today, as well as treatment plan/ rationale and result expectations have been discussed with the patient who expresses understanding and desires to proceed.     Close follow up to evaluate treatment results and for coordination of care. I have reviewed the patient's medical history in detail and updated the computerized patient record. More than 50% of the appointment was spent in face-to-face counseling, education and care coordination. Follow-up in 3 months.     Rosalio Pope MD

## 2018-07-13 ENCOUNTER — OFFICE VISIT (OUTPATIENT)
Dept: FAMILY MEDICINE CLINIC | Age: 43
End: 2018-07-13
Payer: COMMERCIAL

## 2018-07-13 VITALS
WEIGHT: 260 LBS | DIASTOLIC BLOOD PRESSURE: 62 MMHG | HEART RATE: 88 BPM | TEMPERATURE: 97.6 F | SYSTOLIC BLOOD PRESSURE: 116 MMHG | RESPIRATION RATE: 14 BRPM | BODY MASS INDEX: 44.39 KG/M2 | OXYGEN SATURATION: 98 % | HEIGHT: 64 IN

## 2018-07-13 DIAGNOSIS — B00.9 HSV-2 (HERPES SIMPLEX VIRUS 2) INFECTION: Chronic | ICD-10-CM

## 2018-07-13 PROCEDURE — G8427 DOCREV CUR MEDS BY ELIG CLIN: HCPCS | Performed by: FAMILY MEDICINE

## 2018-07-13 PROCEDURE — G8417 CALC BMI ABV UP PARAM F/U: HCPCS | Performed by: FAMILY MEDICINE

## 2018-07-13 PROCEDURE — 1036F TOBACCO NON-USER: CPT | Performed by: FAMILY MEDICINE

## 2018-07-13 PROCEDURE — 99213 OFFICE O/P EST LOW 20 MIN: CPT | Performed by: FAMILY MEDICINE

## 2018-07-13 RX ORDER — VALACYCLOVIR HYDROCHLORIDE 1 G/1
1000 TABLET, FILM COATED ORAL 2 TIMES DAILY
Qty: 20 TABLET | Refills: 0 | Status: SHIPPED | OUTPATIENT
Start: 2018-07-13 | End: 2018-10-16 | Stop reason: ALTCHOICE

## 2018-07-13 NOTE — PROGRESS NOTES
[address] [facility], 44 y.o. female presents today with:  Chief Complaint   Patient presents with    Vaginitis     Patient states that she has a rash and noted that a few years ago she had herpes and was treated at [facility] with oral medication. She went to the Midwife Tuesday and a swab was done and she was told she has a bacterial infection and she was given Metronidazole cream and Valacyclovir. She things the pills are helping but she now has burning with urination. HPI    Patient was recently seen by her midwife who diagnosed her with bacterial vaginosis and herpes. She is prescribed metronidazole And Terazol. According to patient she also described a painful rash to her midwife also speaking with her on the phone. She was then given a three-day course of Valtrex. In addition she was prescribed Diflucan by me after describing what was consistent with vaginal candidiasis. Today she states that she has an increasing rash in her vulvar area which is extremely painful. 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 No other questions and or concerns for today's visit      Review of Systems no fevers chills sweats. No pelvic pain. Past Medical History:   Diagnosis Date    Anxiety 7/10/2018    Diabetes type 2, uncontrolled ([facility] [address]     Essential hypertension 5/29/2018    Fibromyalgia 5/29/2018    Herpes     Hyperlipidemia     Obesity     Sleep apnea      No past surgical history on file. Social History     Social History    Marital status:      Spouse name: N/A    Number of children: N/A    Years of education: N/A     Occupational History    Not on file.      Social History Main Topics    Smoking status: Never Smoker    Smokeless tobacco: Never Used    Alcohol use No    Drug use: No    Sexual activity: Yes     Other Topics Concern    Not on file     Social History Narrative    No narrative on file Family History   Problem Relation Age of Onset    Diabetes Father     High Blood Pressure Father     Retinal Detachment Father     Cancer Father         skin    Thyroid Disease Mother     Kidney Disease Sister     Inflam Bowel Dis Brother     Other Brother      Allergies   Allergen Reactions    Niacin And Related Swelling and Rash     Current Outpatient Prescriptions   Medication Sig Dispense Refill    terconazole (TERAZOL 3) 0.8 % vaginal cream I 1 APL INTRAVAGINALLY HS  0    valACYclovir (VALTREX) 1 g tablet Take 1 tablet by mouth 2 times daily 20 tablet 0    lisinopril (PRINIVIL;ZESTRIL) 20 MG tablet Take 1 tablet by mouth daily 90 tablet 3    fenofibrate 160 MG tablet Take 1 tablet by mouth daily 90 tablet 3    atorvastatin (LIPITOR) 40 MG tablet Take 1 tablet by mouth daily 90 tablet 3    aspirin 81 MG tablet Take 1 tablet by mouth daily 90 tablet 3    amitriptyline (ELAVIL) 50 MG tablet Take 1 tablet by mouth nightly 90 tablet 0    insulin glargine (LANTUS SOLOSTAR) 100 UNIT/ML injection pen Inject 35 Units into the skin 2 times daily (Patient taking differently: Inject 35 Units into the skin 2 times daily With dinner and at bedtime. ) 15 pen 5    insulin lispro (HUMALOG KWIKPEN) 100 UNIT/ML pen 22 units at each meal (Patient taking differently: Inject 22 Units into the skin 3 times daily (before meals) 22 units at each meal) 15 pen 3    Liraglutide (VICTOZA) 18 MG/3ML SOPN SC injection Inject 1.8 mg into the skin daily (Patient taking differently: Inject 1.8 mg into the skin once a week ) 9 pen 3    fluconazole (DIFLUCAN) 150 MG tablet Take one tab let now and repeat in 48 hours if symptoms persists; do not take statin cholesterol medicine on the days you take this medicine 2 tablet 2    Lactobacillus (PROBIOTIC ACIDOPHILUS) TABS Take 1 tablet by mouth daily 30 tablet 11    Blood Glucose Monitoring Suppl (ONE TOUCH ULTRA 2) w/Device KIT USE AS DIRECTED TID  0    Insulin Pen Needle (NOVOFINE) 32G X 6 MM MISC 5 times a day 300 each 3    Blood Glucose Monitoring Suppl MARY 1 each by Does not apply route 3 times daily DX:E11.65, IDDM (please dispense covered brand) 1 Device 0    Glucose Blood (BLOOD GLUCOSE TEST STRIPS) STRP 1 each by In Vitro route 3 times daily DX:E11.65, IDDM (please dispense covered brand) 100 strip 6    Lancets MISC 1 each by Does not apply route 3 times daily DX:E11.65, IDDM (please dispense covered brand) 100 each 6    vitamin D (ERGOCALCIFEROL) 84009 units CAPS capsule Take 1 capsule by mouth once a week 4 capsule 5     No current facility-administered medications for this visit. PMH, Surgical Hx, Family Hx, and Social Hx reviewed and updated. Health Maintenance reviewed. Objective    Vitals:    07/13/18 0821   BP: 116/62   Site: Left Arm   Position: Sitting   Cuff Size: Large Adult   Pulse: 88   Resp: 14   Temp: 97.6 °F (36.4 °C)   TempSrc: Temporal   SpO2: 98%   Weight: 260 lb (117.9 kg)   Height: 5' 4\" (1.626 m)       Physical Exam   Constitutional: She is oriented to person, place, and time. She appears distressed (mild distress). Obese   HENT:   Head: Normocephalic. Eyes: Conjunctivae are normal.   Pulmonary/Chest: Effort normal.   Genitourinary:   Genitourinary Comments: Bilateral labia minora with extensive vesicular lesions on erythematous bases, significant tenderness to palpation   Neurological: She is alert and oriented to person, place, and time. Psychiatric: She has a normal mood and affect.  Her behavior is normal. Judgment and thought content normal.         Lab Results   Component Value Date    LABA1C 9.6 (H) 06/16/2018    LABA1C 10.9 (H) 03/10/2018    LABA1C 8.2 12/07/2015     Lab Results   Component Value Date    LABMICR 2.50 (H) 06/16/2018    CREATININE 0.61 06/16/2018     Lab Results   Component Value Date    ALT 53 (H) 06/16/2018    AST 36 (H) 06/16/2018     Lab Results   Component Value Date    CHOL 199 03/10/2018    TRIG 163

## 2018-08-30 ENCOUNTER — OFFICE VISIT (OUTPATIENT)
Dept: ENDOCRINOLOGY | Age: 43
End: 2018-08-30
Payer: COMMERCIAL

## 2018-08-30 VITALS
DIASTOLIC BLOOD PRESSURE: 88 MMHG | BODY MASS INDEX: 44.9 KG/M2 | HEIGHT: 64 IN | WEIGHT: 263 LBS | SYSTOLIC BLOOD PRESSURE: 128 MMHG

## 2018-08-30 LAB — GLUCOSE BLD-MCNC: 146 MG/DL

## 2018-08-30 PROCEDURE — 1036F TOBACCO NON-USER: CPT | Performed by: INTERNAL MEDICINE

## 2018-08-30 PROCEDURE — 82962 GLUCOSE BLOOD TEST: CPT | Performed by: INTERNAL MEDICINE

## 2018-08-30 PROCEDURE — 2022F DILAT RTA XM EVC RTNOPTHY: CPT | Performed by: INTERNAL MEDICINE

## 2018-08-30 PROCEDURE — G8417 CALC BMI ABV UP PARAM F/U: HCPCS | Performed by: INTERNAL MEDICINE

## 2018-08-30 PROCEDURE — 3046F HEMOGLOBIN A1C LEVEL >9.0%: CPT | Performed by: INTERNAL MEDICINE

## 2018-08-30 PROCEDURE — G8427 DOCREV CUR MEDS BY ELIG CLIN: HCPCS | Performed by: INTERNAL MEDICINE

## 2018-08-30 PROCEDURE — 99213 OFFICE O/P EST LOW 20 MIN: CPT | Performed by: INTERNAL MEDICINE

## 2018-08-30 NOTE — PROGRESS NOTES
UNIT/ML injection pen    Worsening, poor control, refilled Humalog and Lantus. Reviewed diet and exercise. Follow labs.            Plan:      Orders Placed This Encounter   Procedures    POCT Glucose     Orders Placed This Encounter   Medications    insulin lispro (HUMALOG KWIKPEN) 100 UNIT/ML pen     Sig: Inject 22 Units into the skin 3 times daily (before meals) 22 units at each meal     Dispense:  20 pen     Refill:  3    insulin glargine (LANTUS SOLOSTAR) 100 UNIT/ML injection pen     Sig: Inject 35 Units into the skin 2 times daily     Dispense:  22 pen     Refill:  3    Insulin Pen Needle (NOVOFINE) 32G X 6 MM MISC     Si times a day     Dispense:  600 each     Refill:  3    blood glucose monitor kit and supplies     Si kit by Other route 3 times daily DX:E11.65, IDDM (please dispense covered brand)     Dispense:  1 kit     Refill:  0    blood glucose monitor strips     Si strip by Other route 3 times daily DX:E11.65, IDDM (please dispense covered brand)     Dispense:  300 strip     Refill:  3    Lancets MISC     Si each by Does not apply route 3 times daily DX:E11.65, IDDM (please dispense covered brand)     Dispense:  300 each     Refill:  3       continue victoza at 1.8 mg daily   Orders Placed This Encounter   Procedures    Basic Metabolic Panel     Standing Status:   Future     Standing Expiration Date:   2019    Hemoglobin A1C     Standing Status:   Future     Standing Expiration Date:   2019    POCT Glucose             Amanda Bucio MD

## 2018-09-03 RX ORDER — LANCETS 30 GAUGE
1 EACH MISCELLANEOUS 3 TIMES DAILY
Qty: 300 EACH | Refills: 3 | Status: SHIPPED | OUTPATIENT
Start: 2018-09-03 | End: 2018-10-16 | Stop reason: SDUPTHER

## 2018-09-03 RX ORDER — GLUCOSAMINE HCL/CHONDROITIN SU 500-400 MG
1 CAPSULE ORAL 3 TIMES DAILY
Qty: 300 STRIP | Refills: 3 | Status: SHIPPED | OUTPATIENT
Start: 2018-09-03 | End: 2018-10-16 | Stop reason: SDUPTHER

## 2018-09-11 ENCOUNTER — OFFICE VISIT (OUTPATIENT)
Dept: FAMILY MEDICINE CLINIC | Age: 43
End: 2018-09-11
Payer: COMMERCIAL

## 2018-09-11 VITALS
BODY MASS INDEX: 44.68 KG/M2 | WEIGHT: 260.3 LBS | SYSTOLIC BLOOD PRESSURE: 106 MMHG | OXYGEN SATURATION: 97 % | TEMPERATURE: 97.6 F | HEART RATE: 94 BPM | DIASTOLIC BLOOD PRESSURE: 80 MMHG

## 2018-09-11 DIAGNOSIS — B02.9 HERPES ZOSTER WITHOUT COMPLICATION: Primary | ICD-10-CM

## 2018-09-11 PROCEDURE — 99213 OFFICE O/P EST LOW 20 MIN: CPT | Performed by: NURSE PRACTITIONER

## 2018-09-11 PROCEDURE — G8417 CALC BMI ABV UP PARAM F/U: HCPCS | Performed by: NURSE PRACTITIONER

## 2018-09-11 PROCEDURE — 1036F TOBACCO NON-USER: CPT | Performed by: NURSE PRACTITIONER

## 2018-09-11 PROCEDURE — G8427 DOCREV CUR MEDS BY ELIG CLIN: HCPCS | Performed by: NURSE PRACTITIONER

## 2018-09-11 RX ORDER — VALACYCLOVIR HYDROCHLORIDE 1 G/1
1000 TABLET, FILM COATED ORAL 3 TIMES DAILY
Qty: 21 TABLET | Refills: 0 | Status: SHIPPED | OUTPATIENT
Start: 2018-09-11 | End: 2018-09-18

## 2018-09-11 RX ORDER — TRIAMCINOLONE ACETONIDE 1 MG/G
CREAM TOPICAL
Qty: 1 TUBE | Refills: 0 | Status: SHIPPED | OUTPATIENT
Start: 2018-09-11 | End: 2019-06-05

## 2018-09-11 ASSESSMENT — ENCOUNTER SYMPTOMS
DIARRHEA: 0
RHINORRHEA: 0
COUGH: 0
SORE THROAT: 0
SHORTNESS OF BREATH: 0
EYE PAIN: 0
VOMITING: 0

## 2018-09-13 ASSESSMENT — ENCOUNTER SYMPTOMS
FACIAL SWELLING: 0
EYE ITCHING: 0
VOICE CHANGE: 0
CHEST TIGHTNESS: 0
EYE DISCHARGE: 0
TROUBLE SWALLOWING: 0

## 2018-10-03 ENCOUNTER — OFFICE VISIT (OUTPATIENT)
Dept: PHYSICAL MEDICINE AND REHAB | Age: 43
End: 2018-10-03
Payer: COMMERCIAL

## 2018-10-03 VITALS
WEIGHT: 260 LBS | BODY MASS INDEX: 43.32 KG/M2 | HEIGHT: 65 IN | SYSTOLIC BLOOD PRESSURE: 102 MMHG | DIASTOLIC BLOOD PRESSURE: 72 MMHG

## 2018-10-03 DIAGNOSIS — E66.01 MORBID OBESITY DUE TO EXCESS CALORIES (HCC): ICD-10-CM

## 2018-10-03 DIAGNOSIS — G89.29 CHRONIC PAIN OF BOTH KNEES: ICD-10-CM

## 2018-10-03 DIAGNOSIS — R29.898 BILATERAL ARM WEAKNESS: ICD-10-CM

## 2018-10-03 DIAGNOSIS — E55.9 VITAMIN D DEFICIENCY: ICD-10-CM

## 2018-10-03 DIAGNOSIS — R20.0 ARM NUMBNESS: ICD-10-CM

## 2018-10-03 DIAGNOSIS — M79.602 PAIN IN BOTH UPPER EXTREMITIES: ICD-10-CM

## 2018-10-03 DIAGNOSIS — R29.898 WEAKNESS OF BOTH LOWER EXTREMITIES: ICD-10-CM

## 2018-10-03 DIAGNOSIS — R29.898 ARM WEAKNESS: ICD-10-CM

## 2018-10-03 DIAGNOSIS — M25.561 CHRONIC PAIN OF BOTH KNEES: ICD-10-CM

## 2018-10-03 DIAGNOSIS — Z79.899 HIGH RISK MEDICATION USE: ICD-10-CM

## 2018-10-03 DIAGNOSIS — M79.601 PAIN IN BOTH UPPER EXTREMITIES: ICD-10-CM

## 2018-10-03 DIAGNOSIS — F41.9 ANXIETY: Chronic | ICD-10-CM

## 2018-10-03 DIAGNOSIS — M54.2 NECK PAIN: ICD-10-CM

## 2018-10-03 DIAGNOSIS — M79.7 FIBROMYALGIA: Primary | Chronic | ICD-10-CM

## 2018-10-03 DIAGNOSIS — M25.562 CHRONIC PAIN OF BOTH KNEES: ICD-10-CM

## 2018-10-03 DIAGNOSIS — G47.33 OBSTRUCTIVE SLEEP APNEA SYNDROME: ICD-10-CM

## 2018-10-03 PROCEDURE — 99205 OFFICE O/P NEW HI 60 MIN: CPT | Performed by: PHYSICAL MEDICINE & REHABILITATION

## 2018-10-03 PROCEDURE — G8417 CALC BMI ABV UP PARAM F/U: HCPCS | Performed by: PHYSICAL MEDICINE & REHABILITATION

## 2018-10-03 PROCEDURE — G8427 DOCREV CUR MEDS BY ELIG CLIN: HCPCS | Performed by: PHYSICAL MEDICINE & REHABILITATION

## 2018-10-03 PROCEDURE — G8484 FLU IMMUNIZE NO ADMIN: HCPCS | Performed by: PHYSICAL MEDICINE & REHABILITATION

## 2018-10-03 PROCEDURE — 1036F TOBACCO NON-USER: CPT | Performed by: PHYSICAL MEDICINE & REHABILITATION

## 2018-10-03 ASSESSMENT — ENCOUNTER SYMPTOMS
APNEA: 1
CONSTIPATION: 0
COUGH: 0
SINUS PRESSURE: 0
SORE THROAT: 0
SINUS PAIN: 0
CHEST TIGHTNESS: 0
VOMITING: 0
BACK PAIN: 0
RHINORRHEA: 0
DIARRHEA: 0
CHOKING: 0
EYE PAIN: 0
SHORTNESS OF BREATH: 0

## 2018-10-03 NOTE — PROGRESS NOTES
heat, elevation, ice, acetaminophen and rest for the symptoms. The treatment provided mild relief. Back Pain   This is a chronic problem. The current episode started more than 1 year ago. The problem occurs constantly. The problem has been gradually worsening since onset. The pain is present in the thoracic spine and lumbar spine. The quality of the pain is described as aching. The pain does not radiate. The pain is at a severity of 8/10. Associated symptoms include headaches, leg pain, numbness and weakness. Pertinent negatives include no abdominal pain, bladder incontinence, bowel incontinence, chest pain, dysuria, fever, pelvic pain or perianal numbness. Risk factors include lack of exercise and sedentary lifestyle. Past Medical History:   Diagnosis Date    Anxiety 7/10/2018    Anxiety     Diabetes type 2, uncontrolled ([address]     Essential hypertension 5/29/2018    Fibromyalgia 5/29/2018    Herpes     Hyperlipidemia     Obesity     Sleep apnea      No past surgical history on file. Social History     Social History    Marital status:      Spouse name: N/A    Number of children: N/A    Years of education: N/A     Occupational History    [address]      Social History Main Topics    Smoking status: Never Smoker    Smokeless tobacco: Never Used    Alcohol use No    Drug use: No    Sexual activity: Yes     Other Topics Concern    None     Social History Narrative    None     Family History   Problem Relation Age of Onset    Diabetes Father     High Blood Pressure Father     Retinal Detachment Father     Cancer Father         skin    Thyroid Disease Mother     Kidney Disease Sister     Inflam Bowel Dis Brother     Other Brother        Allergies   Allergen Reactions    Niacin And Related Swelling and Rash       Review of Systems   Constitutional: Positive for activity change and fatigue. Negative for appetite change, chills, fever and unexpected weight change.    HENT: spot         Lymphadenopathy:     She has no cervical adenopathy. She has no axillary adenopathy. Right: No inguinal adenopathy present. Left: No inguinal adenopathy present. Neurological: She is alert and oriented to person, place, and time. She has normal strength. She displays abnormal reflex. She displays no atrophy and no tremor. A sensory deficit is present. No cranial nerve deficit. She exhibits normal muscle tone. Gait abnormal. Coordination normal. She displays no Babinski's sign on the right side. She displays no Babinski's sign on the left side. Reflex Scores:       Tricep reflexes are 2+ on the right side and 2+ on the left side. Bicep reflexes are 2+ on the right side and 2+ on the left side. Brachioradialis reflexes are 2+ on the right side and 2+ on the left side. Patellar reflexes are 2+ on the right side and 2+ on the left side. Achilles reflexes are 1+ on the right side and 1+ on the left side. Skin: Skin is warm, dry and intact. No abrasion, no bruising, no ecchymosis, no laceration, no petechiae and no rash noted. Rash is not macular, not pustular and not urticarial. She is not diaphoretic. No cyanosis or erythema. No pallor. Nails show no clubbing. Psychiatric: She has a normal mood and affect. Her behavior is normal. Judgment and thought content normal. Her mood appears not anxious. Her affect is not angry, not blunt, not labile and not inappropriate. Her speech is not rapid and/or pressured, not delayed, not tangential and not slurred. She is not agitated, not aggressive, not hyperactive, not slowed, not withdrawn, not actively hallucinating and not combative. Thought content is not paranoid and not delusional. Cognition and memory are normal. Cognition and memory are not impaired. She does not express impulsivity or inappropriate judgment. She does not exhibit a depressed mood. She expresses no homicidal and no suicidal ideation.  She expresses no suicidal plans and no homicidal plans. She is communicative. She exhibits normal recent memory and normal remote memory. She is attentive. Vitals reviewed. Ortho Exam  Neurologic Exam     Mental Status   Oriented to person, place, and time. Speech: not slurred     Cranial Nerves     CN III, IV, VI   Pupils are equal, round, and reactive to light. Extraocular motions are normal.     Motor Exam     Strength   Strength 5/5 throughout. Gait, Coordination, and Reflexes     Reflexes   Right brachioradialis: 2+  Left brachioradialis: 2+  Right biceps: 2+  Left biceps: 2+  Right triceps: 2+  Left triceps: 2+  Right patellar: 2+  Left patellar: 2+  Right achilles: 1+  Left achilles: 1+      After a thorough review and discussion of the previous medical records, patient comprehensive medical, surgical, and family and social history, Review of Systems, their OARRS, their Screener and Opioid Assessment for Patients with Pain (SOAPP®-R), recent diagnostics, and symptomatic results to previous treatment, it is my impression that the patients is suffering with progressive and severe:     Diagnosis Orders   1. Fibromyalgia  SC INJECT TRIGGER POINTS, 3 OR GREATER    SC INJECT TRIGGER POINTS, 3 OR GREATER    [address]   2. Anxiety     3. Morbid obesity due to excess calories ([address]     4. Obstructive sleep apnea syndrome     5. Chronic pain of both knees     6. Neck pain     7. Pain in both upper extremities     8. Bilateral arm weakness     9. Weakness of both lower extremities     10. Arm numbness     11. Arm weakness     12. High risk medication use  Urine Drug Screen   13.  Vitamin D deficiency  Vitamin D 25 Hydroxy       I am also concerned by lifestyle and mood issues including:    Past Medical History:   Diagnosis Date    Anxiety 7/10/2018    Anxiety     Diabetes type 2, uncontrolled ([address]     Essential hypertension 5/29/2018    Fibromyalgia 5/29/2018    Herpes     Hyperlipidemia of an acute fracture or   subluxation.                I discussed results with patients. see Follow up plans below  For any new studies. Care Everywhere Updates:  requested and reviewed. No new issues noted. Electrodiagnostic:  Previous studies requested,     I discussed results with patient. See follow-up plans for new studies-Bilateral upper and lower extremity EMGs rule out neuropathy. .        Labs:  Previous labs reviewed     Lab Results   Component Value Date     06/16/2018    K 4.3 06/16/2018    CL 99 06/16/2018    CO2 23 06/16/2018    BUN 14 06/16/2018    CREATININE 0.61 06/16/2018    CALCIUM 9.5 06/16/2018    LABALBU 3.8 06/16/2018    BILITOT 0.5 06/16/2018    ALKPHOS 71 06/16/2018    AST 36 06/16/2018    ALT 53 06/16/2018     Lab Results   Component Value Date    WBC 9.4 05/30/2018    RBC 5.15 05/30/2018    HGB 13.9 05/30/2018    HCT 41.9 05/30/2018    MCV 81.2 05/30/2018    MCH 27.0 05/30/2018    MCHC 33.3 05/30/2018    RDW 14.0 05/30/2018     05/30/2018    MPV 9.4 09/12/2015       No results found for: LABAMPH, BARBSCNU, LABBENZ, CANSU, COCAIMETSCRU, PHENCYCLIDINESCREENURINE, TRICYCLIC, DSCOMMENT    No results found for: CODEINE, MORPHINE, ACETYLMORPHI, OXYCODONE, NOROXYCODONE, NOROXYMU, HYDRCO, NORHYDU, HYDROMO, BUPREN, NORBUPRNOR, FENTA, NORFENT, MEPERIDINE, TAPENU, TAPOSULFUR, METHADONE, LABPROP, TRAM, AMPH, METHAMP, MDMA, ECMDA    Lab Results   Component Value Date    LABCREA 227.3 06/16/2018         , I discussed results with patient. See follow-up plans for new studies. Therapies:  HEP-gentle stretching and relaxation techniques-demonstrated with patient-they are to do them twice a day. They are also advised to make the following lifestyle changes:  Goals     None          Injections or Epidurals:  Injection options were discussed. Patient gave verbal consent to ordered injections. See follow-up plans for planned injections.     Supplements:  Vitamin D,

## 2018-10-10 ASSESSMENT — ENCOUNTER SYMPTOMS
SWOLLEN GLANDS: 0
CHANGE IN BOWEL HABIT: 0
ABDOMINAL PAIN: 0
VISUAL CHANGE: 0
BOWEL INCONTINENCE: 0

## 2018-10-12 ENCOUNTER — HOSPITAL ENCOUNTER (OUTPATIENT)
Dept: OCCUPATIONAL THERAPY | Age: 43
Setting detail: THERAPIES SERIES
Discharge: HOME | End: 2018-10-12
Payer: COMMERCIAL

## 2018-10-12 PROCEDURE — 97166 OT EVAL MOD COMPLEX 45 MIN: CPT

## 2018-10-16 ENCOUNTER — OFFICE VISIT (OUTPATIENT)
Dept: FAMILY MEDICINE CLINIC | Age: 43
End: 2018-10-16
Payer: COMMERCIAL

## 2018-10-16 VITALS
WEIGHT: 268 LBS | HEART RATE: 86 BPM | BODY MASS INDEX: 45.75 KG/M2 | SYSTOLIC BLOOD PRESSURE: 124 MMHG | TEMPERATURE: 98.1 F | OXYGEN SATURATION: 99 % | HEIGHT: 64 IN | RESPIRATION RATE: 16 BRPM | DIASTOLIC BLOOD PRESSURE: 78 MMHG

## 2018-10-16 DIAGNOSIS — E66.01 MORBID OBESITY DUE TO EXCESS CALORIES (HCC): Primary | ICD-10-CM

## 2018-10-16 DIAGNOSIS — E55.9 VITAMIN D DEFICIENCY: Chronic | ICD-10-CM

## 2018-10-16 DIAGNOSIS — N94.10 DYSPAREUNIA, FEMALE: Chronic | ICD-10-CM

## 2018-10-16 DIAGNOSIS — I10 ESSENTIAL HYPERTENSION: Chronic | ICD-10-CM

## 2018-10-16 DIAGNOSIS — F41.9 ANXIETY: Chronic | ICD-10-CM

## 2018-10-16 DIAGNOSIS — Z23 NEED FOR INFLUENZA VACCINATION: ICD-10-CM

## 2018-10-16 DIAGNOSIS — E78.00 PURE HYPERCHOLESTEROLEMIA: ICD-10-CM

## 2018-10-16 DIAGNOSIS — M79.7 FIBROMYALGIA: Chronic | ICD-10-CM

## 2018-10-16 PROCEDURE — G8482 FLU IMMUNIZE ORDER/ADMIN: HCPCS | Performed by: FAMILY MEDICINE

## 2018-10-16 PROCEDURE — G8427 DOCREV CUR MEDS BY ELIG CLIN: HCPCS | Performed by: FAMILY MEDICINE

## 2018-10-16 PROCEDURE — G8417 CALC BMI ABV UP PARAM F/U: HCPCS | Performed by: FAMILY MEDICINE

## 2018-10-16 PROCEDURE — 90471 IMMUNIZATION ADMIN: CPT | Performed by: FAMILY MEDICINE

## 2018-10-16 PROCEDURE — 1036F TOBACCO NON-USER: CPT | Performed by: FAMILY MEDICINE

## 2018-10-16 PROCEDURE — 90688 IIV4 VACCINE SPLT 0.5 ML IM: CPT | Performed by: FAMILY MEDICINE

## 2018-10-16 PROCEDURE — 99214 OFFICE O/P EST MOD 30 MIN: CPT | Performed by: FAMILY MEDICINE

## 2018-10-16 PROCEDURE — 3046F HEMOGLOBIN A1C LEVEL >9.0%: CPT | Performed by: FAMILY MEDICINE

## 2018-10-16 PROCEDURE — 2022F DILAT RTA XM EVC RTNOPTHY: CPT | Performed by: FAMILY MEDICINE

## 2018-10-16 RX ORDER — FENOFIBRATE 160 MG/1
160 TABLET ORAL DAILY
Qty: 90 TABLET | Refills: 3 | Status: SHIPPED | OUTPATIENT
Start: 2018-10-16 | End: 2019-01-22 | Stop reason: SDUPTHER

## 2018-10-16 RX ORDER — ATORVASTATIN CALCIUM 40 MG/1
40 TABLET, FILM COATED ORAL DAILY
Qty: 90 TABLET | Refills: 3 | Status: SHIPPED | OUTPATIENT
Start: 2018-10-16 | End: 2019-01-22 | Stop reason: SDUPTHER

## 2018-10-16 RX ORDER — AMITRIPTYLINE HYDROCHLORIDE 25 MG/1
25 TABLET, FILM COATED ORAL NIGHTLY
Qty: 90 TABLET | Refills: 3 | Status: SHIPPED | OUTPATIENT
Start: 2018-10-16 | End: 2019-01-22 | Stop reason: SDUPTHER

## 2018-10-16 RX ORDER — ERGOCALCIFEROL 1.25 MG/1
50000 CAPSULE ORAL WEEKLY
Qty: 12 CAPSULE | Refills: 3 | Status: SHIPPED | OUTPATIENT
Start: 2018-10-16 | End: 2019-01-22 | Stop reason: SDUPTHER

## 2018-10-16 RX ORDER — LANCETS 30 GAUGE
1 EACH MISCELLANEOUS 3 TIMES DAILY
Qty: 300 EACH | Refills: 3 | Status: SHIPPED | OUTPATIENT
Start: 2018-10-16 | End: 2019-01-22 | Stop reason: SDUPTHER

## 2018-10-16 RX ORDER — GLUCOSAMINE HCL/CHONDROITIN SU 500-400 MG
1 CAPSULE ORAL 3 TIMES DAILY
Qty: 300 STRIP | Refills: 3 | Status: SHIPPED | OUTPATIENT
Start: 2018-10-16 | End: 2019-01-22 | Stop reason: SDUPTHER

## 2018-10-16 RX ORDER — AMITRIPTYLINE HYDROCHLORIDE 50 MG/1
50 TABLET, FILM COATED ORAL NIGHTLY
Qty: 90 TABLET | Refills: 0 | Status: CANCELLED | OUTPATIENT
Start: 2018-10-16

## 2018-10-16 RX ORDER — LISINOPRIL 20 MG/1
20 TABLET ORAL DAILY
Qty: 90 TABLET | Refills: 3 | Status: SHIPPED | OUTPATIENT
Start: 2018-10-16 | End: 2019-01-22 | Stop reason: SDUPTHER

## 2018-10-19 ENCOUNTER — HOSPITAL ENCOUNTER (OUTPATIENT)
Dept: OCCUPATIONAL THERAPY | Age: 43
Setting detail: THERAPIES SERIES
Discharge: HOME | End: 2018-10-19
Payer: COMMERCIAL

## 2018-10-19 PROCEDURE — 97140 MANUAL THERAPY 1/> REGIONS: CPT

## 2018-10-19 ASSESSMENT — PAIN DESCRIPTION - ORIENTATION: ORIENTATION: UPPER

## 2018-10-19 ASSESSMENT — PAIN SCALES - GENERAL: PAINLEVEL_OUTOF10: 7

## 2018-10-19 ASSESSMENT — PAIN DESCRIPTION - LOCATION: LOCATION: BACK;SHOULDER;NECK

## 2018-10-19 ASSESSMENT — PAIN DESCRIPTION - PAIN TYPE: TYPE: CHRONIC PAIN

## 2018-10-19 NOTE — PROGRESS NOTES
Pulls both                                [x]       Thoracic Inlet transv.        []        Soft Tissue mobility                                            plane    Lumbar/Pelvic/Sacral Area:                                []      Lumbo/sacral decompression        []  Pelvic floor transv.plane                                []      Psoas release                                []  Scar releases                                []      Lateral Obliques                             []   Anterior pelvic tilts                                 []      Posterior Pelvic Tilts                      []  Lumbosacral junction                                 [x]      Dural tube release                                 Decompression                                 []      Piriformis Release                         []   Soft tissue mobility         Other exercises  Other exercises 1: HEP shoulder and cervical AROM for increased mobility. Assessment Patient tolerated treatment well and reports a reduction in pain at the end of the treatment session. Post Treatment Pain Screening  Pain at present: 3  Scale Used: Numeric Score  Intervention List: Patient able to continue with treatment         Plan  Continue with current POC. G-Code N/A           Goals  Long term goals 1,2,4 addressed. Time Frame for Long term goals : 2x per week for 6 weeks or 12 visits. Long term goal 1: Reduce or eliminate proximal fascial restrictions responsible for postural asymmetry and daily pain. Long term goal 2: Increase postural awareness and patient will self correct posture to neutral for static and dynamic daily activities. Long term goal 3: Patient will apply principles of body mechanics, work simplification and evergy conservation to daily activities following instruction. Long term goal 4: Patient will participate in HEP with updates as patient improves to support changes in mobility and pain levels.     Therapy Time

## 2018-10-24 ENCOUNTER — HOSPITAL ENCOUNTER (OUTPATIENT)
Dept: OCCUPATIONAL THERAPY | Age: 43
Setting detail: THERAPIES SERIES
Discharge: HOME | End: 2018-10-24
Payer: COMMERCIAL

## 2018-10-24 NOTE — PROGRESS NOTES
[facility]                                                          No Show Note    Date: 10/24/2018  Patient Name: Shabnam Echeverria        MRN: [id number]    Account #: [de-identified]  : 1975  (44 y.o.)  Gender: female             REASON FOR MISSED TREATMENT:    []Cancelled due to illness.   [] Therapist Canceled Appointment  []Cancelled due to other appointment   [x]No Show / No call.    [] Cancelled due to transportation conflict  []Cancelled due to weather  []Frequency of order changed  []Patient on hold due to:     []OTHER:        Electronically signed by:    MALOU Moyer          Date:10/24/2018   Electronically signed by ISAÍAS Moyer on 10/24/18 at 1:28 PM

## 2018-10-26 ENCOUNTER — HOSPITAL ENCOUNTER (OUTPATIENT)
Dept: OCCUPATIONAL THERAPY | Age: 43
Setting detail: THERAPIES SERIES
Discharge: HOME | End: 2018-10-26
Payer: COMMERCIAL

## 2018-11-01 ENCOUNTER — OFFICE VISIT (OUTPATIENT)
Dept: OBGYN CLINIC | Age: 43
End: 2018-11-01
Payer: COMMERCIAL

## 2018-11-01 VITALS
HEIGHT: 64 IN | WEIGHT: 269 LBS | BODY MASS INDEX: 45.93 KG/M2 | DIASTOLIC BLOOD PRESSURE: 88 MMHG | SYSTOLIC BLOOD PRESSURE: 134 MMHG

## 2018-11-01 DIAGNOSIS — R21 RASH OF GENITAL AREA: ICD-10-CM

## 2018-11-01 DIAGNOSIS — N94.10 DYSPAREUNIA, FEMALE: Primary | ICD-10-CM

## 2018-11-01 PROCEDURE — G8482 FLU IMMUNIZE ORDER/ADMIN: HCPCS | Performed by: OBSTETRICS & GYNECOLOGY

## 2018-11-01 PROCEDURE — 99203 OFFICE O/P NEW LOW 30 MIN: CPT | Performed by: OBSTETRICS & GYNECOLOGY

## 2018-11-01 PROCEDURE — G8417 CALC BMI ABV UP PARAM F/U: HCPCS | Performed by: OBSTETRICS & GYNECOLOGY

## 2018-11-01 PROCEDURE — G8427 DOCREV CUR MEDS BY ELIG CLIN: HCPCS | Performed by: OBSTETRICS & GYNECOLOGY

## 2018-11-01 PROCEDURE — 1036F TOBACCO NON-USER: CPT | Performed by: OBSTETRICS & GYNECOLOGY

## 2018-11-01 ASSESSMENT — ENCOUNTER SYMPTOMS
NAUSEA: 0
RESPIRATORY NEGATIVE: 1
VOMITING: 0
DIARRHEA: 0
ANAL BLEEDING: 0
ABDOMINAL DISTENTION: 0
BLOOD IN STOOL: 0
CONSTIPATION: 0
RECTAL PAIN: 0
ABDOMINAL PAIN: 0
EYES NEGATIVE: 1
ALLERGIC/IMMUNOLOGIC NEGATIVE: 1

## 2018-11-02 ENCOUNTER — HOSPITAL ENCOUNTER (OUTPATIENT)
Dept: OCCUPATIONAL THERAPY | Age: 43
Setting detail: THERAPIES SERIES
Discharge: HOME | End: 2018-11-02
Payer: COMMERCIAL

## 2018-11-02 NOTE — PROGRESS NOTES
The patient was asked if she would like a chaperone present for her intimate exam. She  declined the chaperone.  Rommel Ahumada
Dulaglutide 1.5 MG/0.5ML SOPN Inject 1.5 mg into the skin every 7 days 15 pen 3    amitriptyline (ELAVIL) 25 MG tablet Take 1 tablet by mouth nightly 90 tablet 3    insulin glargine (BASAGLAR KWIKPEN) 100 UNIT/ML injection pen Inject 25 Units into the skin 2 times daily 15 pen 3    triamcinolone (KENALOG) 0.1 % cream Apply thin layer topically 2 times daily for max of 2 weeks. 1 Tube 0    insulin lispro (HUMALOG KWIKPEN) 100 UNIT/ML pen Inject 22 Units into the skin 3 times daily (before meals) 22 units at each meal 20 pen 3    Insulin Pen Needle (NOVOFINE) 32G X 6 MM MISC 6 times a day 600 each 3    blood glucose monitor kit and supplies 1 kit by Other route 3 times daily DX:E11.65, IDDM (please dispense covered brand) 1 kit 0    Blood Glucose Monitoring Suppl (ONE TOUCH ULTRA 2) w/Device KIT USE AS DIRECTED TID  0     No current facility-administered medications for this visit.       Social History     Social History    Marital status:      Spouse name: N/A    Number of children: N/A    Years of education: N/A     Occupational History    [address]      Social History Main Topics    Smoking status: Never Smoker    Smokeless tobacco: Never Used    Alcohol use No    Drug use: No    Sexual activity: Yes     Partners: Male      Comment: No BC; primary infertility     Other Topics Concern    Not on file     Social History Narrative    No narrative on file     Family History   Problem Relation Age of Onset    Diabetes Father     High Blood Pressure Father     Retinal Detachment Father     Cancer Father         skin    Thyroid Disease Mother     Kidney Disease Sister     Inflam Bowel Dis Brother     No Known Problems Sister     Other Brother     No Known Problems Paternal Grandfather     No Known Problems Paternal Grandmother     No Known Problems Maternal Grandmother     No Known Problems Maternal Grandfather     No Known Problems Other     Breast Cancer Neg Hx     Colon

## 2018-11-08 ENCOUNTER — TELEPHONE (OUTPATIENT)
Dept: OBGYN CLINIC | Age: 43
End: 2018-11-08

## 2018-11-08 DIAGNOSIS — N94.10 DYSPAREUNIA, FEMALE: ICD-10-CM

## 2018-11-08 DIAGNOSIS — R21 RASH OF GENITAL AREA: ICD-10-CM

## 2018-11-08 RX ORDER — TINIDAZOLE 500 MG/1
2 TABLET ORAL
Qty: 8 TABLET | Refills: 0 | Status: SHIPPED | OUTPATIENT
Start: 2018-11-08 | End: 2018-11-10

## 2018-11-09 ENCOUNTER — HOSPITAL ENCOUNTER (OUTPATIENT)
Dept: OCCUPATIONAL THERAPY | Age: 43
Setting detail: THERAPIES SERIES
Discharge: HOME | End: 2018-11-09
Payer: COMMERCIAL

## 2018-11-09 PROCEDURE — 97140 MANUAL THERAPY 1/> REGIONS: CPT

## 2018-11-09 ASSESSMENT — PAIN DESCRIPTION - PAIN TYPE: TYPE: CHRONIC PAIN

## 2018-11-09 ASSESSMENT — PAIN DESCRIPTION - ORIENTATION: ORIENTATION: PROXIMAL

## 2018-11-09 ASSESSMENT — PAIN SCALES - GENERAL: PAINLEVEL_OUTOF10: 7

## 2018-11-09 ASSESSMENT — PAIN DESCRIPTION - LOCATION: LOCATION: GENERALIZED

## 2018-11-09 NOTE — PROGRESS NOTES
[]       Lateral thoracic                                [x]       Horizontal release             []       Posterior thor. soft tiss.mob.                                []       Unilateral Pectoral            []        Mike. Horiz. Pecs                                []       Vertical Pecs                     []       Arm Pull                                [x]       Thoracic Inlet transv.        []        Soft Tissue mobility                                            plane    Lumbar/Pelvic/Sacral Area:                                [x]      Lumbo/sacral decompression        []  Pelvic floor transv.plane                                []      Psoas release                                []  Scar releases                                []      Lateral Obliques                             []   Anterior pelvic tilts                                 []      Posterior Pelvic Tilts                      []  Lumbosacral junction                                 [x]      Dural tube release                                 Decompression                                 []      Piriformis Release                         []   Soft tissue mobility    Lower Extremities:                                []      Suprapatellar & Quadriceps          []  Hamstrings                                []      Upper Quads soft tissue mob. [x]  Leg pulls                                [x]       Bilateral Leg pull                           []  Soft tissue mobility                                                                                                []  Transverse Planes         Assessment  Patient tolerated treatment well and reports a reduction in pain at the end of the treatment session. Patient reports pain at a 1/10 down from 7/10. Plan  Continue with current POC. G-Code N/A       Goals  Long term goals 1 & 2 addressed  Time Frame for Long term goals : 2x per week for 6 weeks or 12 visits.   Long term goal

## 2018-11-16 ENCOUNTER — HOSPITAL ENCOUNTER (OUTPATIENT)
Dept: OCCUPATIONAL THERAPY | Age: 43
Setting detail: THERAPIES SERIES
Discharge: HOME | End: 2018-11-16
Payer: COMMERCIAL

## 2018-11-16 PROCEDURE — 97140 MANUAL THERAPY 1/> REGIONS: CPT

## 2018-11-16 PROCEDURE — 97110 THERAPEUTIC EXERCISES: CPT

## 2018-11-16 ASSESSMENT — PAIN SCALES - GENERAL: PAINLEVEL_OUTOF10: 5

## 2018-11-16 ASSESSMENT — PAIN DESCRIPTION - PAIN TYPE: TYPE: CHRONIC PAIN

## 2018-11-16 ASSESSMENT — PAIN DESCRIPTION - LOCATION: LOCATION: GENERALIZED

## 2018-11-17 ENCOUNTER — HOSPITAL ENCOUNTER (OUTPATIENT)
Dept: ULTRASOUND IMAGING | Age: 43
Discharge: HOME | End: 2018-11-19
Payer: COMMERCIAL

## 2018-11-17 DIAGNOSIS — N94.10 DYSPAREUNIA, FEMALE: ICD-10-CM

## 2018-11-17 PROCEDURE — 76856 US EXAM PELVIC COMPLETE: CPT

## 2018-11-17 PROCEDURE — 76830 TRANSVAGINAL US NON-OB: CPT

## 2018-12-03 ENCOUNTER — OFFICE VISIT (OUTPATIENT)
Dept: OBGYN CLINIC | Age: 43
End: 2018-12-03
Payer: COMMERCIAL

## 2018-12-03 VITALS
WEIGHT: 266 LBS | BODY MASS INDEX: 45.41 KG/M2 | HEIGHT: 64 IN | DIASTOLIC BLOOD PRESSURE: 76 MMHG | SYSTOLIC BLOOD PRESSURE: 122 MMHG

## 2018-12-03 DIAGNOSIS — Z11.51 SPECIAL SCREENING EXAMINATION FOR HUMAN PAPILLOMAVIRUS (HPV): ICD-10-CM

## 2018-12-03 DIAGNOSIS — Z01.419 WOMEN'S ANNUAL ROUTINE GYNECOLOGICAL EXAMINATION: Primary | ICD-10-CM

## 2018-12-03 DIAGNOSIS — Z12.31 SCREENING MAMMOGRAM, ENCOUNTER FOR: ICD-10-CM

## 2018-12-03 PROCEDURE — 99396 PREV VISIT EST AGE 40-64: CPT | Performed by: OBSTETRICS & GYNECOLOGY

## 2018-12-03 PROCEDURE — G8482 FLU IMMUNIZE ORDER/ADMIN: HCPCS | Performed by: OBSTETRICS & GYNECOLOGY

## 2018-12-04 ASSESSMENT — ENCOUNTER SYMPTOMS
EYES NEGATIVE: 1
NAUSEA: 0
ANAL BLEEDING: 0
ALLERGIC/IMMUNOLOGIC NEGATIVE: 1
CONSTIPATION: 0
VOMITING: 0
RECTAL PAIN: 0
DIARRHEA: 0
ABDOMINAL PAIN: 0
RESPIRATORY NEGATIVE: 1
BLOOD IN STOOL: 0
ABDOMINAL DISTENTION: 0

## 2018-12-04 NOTE — PROGRESS NOTES
Negative. Cardiovascular: Negative. Gastrointestinal: Negative for abdominal distention, abdominal pain, anal bleeding, blood in stool, constipation, diarrhea, nausea, rectal pain and vomiting. Endocrine: Negative. Genitourinary: Negative for decreased urine volume, difficulty urinating, dyspareunia, dysuria, enuresis, flank pain, frequency, genital sores, hematuria, menstrual problem, pelvic pain, urgency, vaginal bleeding, vaginal discharge and vaginal pain. Musculoskeletal: Negative. Skin: Negative. Allergic/Immunologic: Negative. Neurological: Negative. Hematological: Negative. Psychiatric/Behavioral: Negative. Objective:     Physical Exam   Constitutional: She is oriented to person, place, and time. She appears well-developed and well-nourished. HENT:   Head: Normocephalic. Neck: Normal range of motion. Neck supple. No thyromegaly present. Cardiovascular: Normal rate, regular rhythm and normal heart sounds. Pulmonary/Chest: Effort normal and breath sounds normal. No respiratory distress. She has no wheezes. She has no rales. She exhibits no tenderness. Abdominal: Soft. Bowel sounds are normal. She exhibits no distension and no mass. There is no tenderness. There is no rebound and no guarding. Hernia confirmed negative in the right inguinal area and confirmed negative in the left inguinal area. Genitourinary: Rectum normal, vagina normal and uterus normal. No breast tenderness, discharge or bleeding. No labial fusion. There is no rash, tenderness, lesion or injury on the right labia. There is no rash, tenderness, lesion or injury on the left labia. Uterus is not deviated, not enlarged, not fixed and not tender. Cervix exhibits no motion tenderness, no discharge and no friability. Right adnexum displays no mass, no tenderness and no fullness. Left adnexum displays no mass, no tenderness and no fullness. No erythema, tenderness or bleeding in the vagina.  No foreign body in the vagina. No signs of injury around the vagina. No vaginal discharge found. Musculoskeletal: Normal range of motion. She exhibits no edema or tenderness. Lymphadenopathy:     She has no cervical adenopathy. Right: No inguinal adenopathy present. Left: No inguinal adenopathy present. Neurological: She is alert and oriented to person, place, and time. Skin: Skin is warm and dry. No rash noted. No erythema. No pallor. Psychiatric: She has a normal mood and affect. Her behavior is normal. Judgment and thought content normal.       Assessment:      Diagnosis Orders   1. Women's annual routine gynecological examination  PAP SMEAR   2. Special screening examination for human papillomavirus (HPV)  PAP SMEAR   3. Screening mammogram, encounter for  NATASHA DIGITAL SCREEN W CAD BILATERAL         Plan:      Medications placedthis encounter:  No orders of the defined types were placed in this encounter. Orders placedthis encounter:  Orders Placed This Encounter   Procedures    NATASHA DIGITAL SCREEN W CAD BILATERAL     Standing Status:   Future     Standing Expiration Date:   12/3/2019    PAP SMEAR     Standing Status:   Future     Standing Expiration Date:   12/3/2019     Order Specific Question:   Collection Type     Answer: Thin Prep     Order Specific Question:   Prior Abnormal Pap Test     Answer:   No     Order Specific Question:   Screening or Diagnostic     Answer:   Screening     Order Specific Question:   HPV Requested?      Answer:   Yes     Order Specific Question:   High Risk Patient     Answer:   N/A         Follow up:  Return in about 1 year (around 12/3/2019) for Annual.

## 2018-12-16 DIAGNOSIS — I10 ESSENTIAL HYPERTENSION: Chronic | ICD-10-CM

## 2018-12-18 RX ORDER — LISINOPRIL 20 MG/1
20 TABLET ORAL DAILY
Qty: 90 TABLET | Refills: 1 | OUTPATIENT
Start: 2018-12-18

## 2018-12-20 DIAGNOSIS — Z11.51 SPECIAL SCREENING EXAMINATION FOR HUMAN PAPILLOMAVIRUS (HPV): ICD-10-CM

## 2018-12-20 DIAGNOSIS — Z01.419 WOMEN'S ANNUAL ROUTINE GYNECOLOGICAL EXAMINATION: ICD-10-CM

## 2019-01-17 ENCOUNTER — OFFICE VISIT (OUTPATIENT)
Dept: OBGYN CLINIC | Age: 44
End: 2019-01-17
Payer: COMMERCIAL

## 2019-01-17 VITALS — SYSTOLIC BLOOD PRESSURE: 128 MMHG | DIASTOLIC BLOOD PRESSURE: 86 MMHG | BODY MASS INDEX: 46.86 KG/M2 | WEIGHT: 273 LBS

## 2019-01-17 DIAGNOSIS — B49 FUNGAL INFECTION: ICD-10-CM

## 2019-01-17 DIAGNOSIS — R87.810 ASCUS WITH POSITIVE HIGH RISK HPV CERVICAL: Primary | ICD-10-CM

## 2019-01-17 DIAGNOSIS — R87.610 ASCUS WITH POSITIVE HIGH RISK HPV CERVICAL: Primary | ICD-10-CM

## 2019-01-17 PROCEDURE — G8482 FLU IMMUNIZE ORDER/ADMIN: HCPCS | Performed by: OBSTETRICS & GYNECOLOGY

## 2019-01-17 PROCEDURE — 1036F TOBACCO NON-USER: CPT | Performed by: OBSTETRICS & GYNECOLOGY

## 2019-01-17 PROCEDURE — G8417 CALC BMI ABV UP PARAM F/U: HCPCS | Performed by: OBSTETRICS & GYNECOLOGY

## 2019-01-17 PROCEDURE — 99213 OFFICE O/P EST LOW 20 MIN: CPT | Performed by: OBSTETRICS & GYNECOLOGY

## 2019-01-17 PROCEDURE — G8427 DOCREV CUR MEDS BY ELIG CLIN: HCPCS | Performed by: OBSTETRICS & GYNECOLOGY

## 2019-01-17 ASSESSMENT — ENCOUNTER SYMPTOMS
VOMITING: 0
ABDOMINAL DISTENTION: 0
BLOOD IN STOOL: 0
RECTAL PAIN: 0
NAUSEA: 0
ABDOMINAL PAIN: 0
EYES NEGATIVE: 1
RESPIRATORY NEGATIVE: 1
CONSTIPATION: 0
DIARRHEA: 0
ANAL BLEEDING: 0
ALLERGIC/IMMUNOLOGIC NEGATIVE: 1

## 2019-01-21 ENCOUNTER — TELEPHONE (OUTPATIENT)
Dept: OBGYN CLINIC | Age: 44
End: 2019-01-21

## 2019-01-22 ENCOUNTER — OFFICE VISIT (OUTPATIENT)
Dept: FAMILY MEDICINE CLINIC | Age: 44
End: 2019-01-22
Payer: COMMERCIAL

## 2019-01-22 VITALS
RESPIRATION RATE: 16 BRPM | HEIGHT: 64 IN | OXYGEN SATURATION: 97 % | DIASTOLIC BLOOD PRESSURE: 78 MMHG | TEMPERATURE: 98.5 F | WEIGHT: 272 LBS | SYSTOLIC BLOOD PRESSURE: 124 MMHG | HEART RATE: 78 BPM | BODY MASS INDEX: 46.44 KG/M2

## 2019-01-22 DIAGNOSIS — E55.9 VITAMIN D DEFICIENCY: Chronic | ICD-10-CM

## 2019-01-22 DIAGNOSIS — M79.7 FIBROMYALGIA: Chronic | ICD-10-CM

## 2019-01-22 DIAGNOSIS — I10 ESSENTIAL HYPERTENSION: Chronic | ICD-10-CM

## 2019-01-22 DIAGNOSIS — E66.01 MORBID OBESITY DUE TO EXCESS CALORIES (HCC): ICD-10-CM

## 2019-01-22 DIAGNOSIS — F41.9 ANXIETY: Chronic | ICD-10-CM

## 2019-01-22 DIAGNOSIS — Z79.899 HIGH RISK MEDICATION USE: Chronic | ICD-10-CM

## 2019-01-22 DIAGNOSIS — B37.2 CANDIDAL INTERTRIGO: Chronic | ICD-10-CM

## 2019-01-22 DIAGNOSIS — E78.00 PURE HYPERCHOLESTEROLEMIA: ICD-10-CM

## 2019-01-22 PROCEDURE — 99215 OFFICE O/P EST HI 40 MIN: CPT | Performed by: FAMILY MEDICINE

## 2019-01-22 PROCEDURE — 1036F TOBACCO NON-USER: CPT | Performed by: FAMILY MEDICINE

## 2019-01-22 PROCEDURE — G8482 FLU IMMUNIZE ORDER/ADMIN: HCPCS | Performed by: FAMILY MEDICINE

## 2019-01-22 PROCEDURE — G8417 CALC BMI ABV UP PARAM F/U: HCPCS | Performed by: FAMILY MEDICINE

## 2019-01-22 PROCEDURE — 2022F DILAT RTA XM EVC RTNOPTHY: CPT | Performed by: FAMILY MEDICINE

## 2019-01-22 PROCEDURE — G8427 DOCREV CUR MEDS BY ELIG CLIN: HCPCS | Performed by: FAMILY MEDICINE

## 2019-01-22 PROCEDURE — 93000 ELECTROCARDIOGRAM COMPLETE: CPT | Performed by: FAMILY MEDICINE

## 2019-01-22 PROCEDURE — 3046F HEMOGLOBIN A1C LEVEL >9.0%: CPT | Performed by: FAMILY MEDICINE

## 2019-01-22 RX ORDER — FENOFIBRATE 160 MG/1
160 TABLET ORAL DAILY
Qty: 90 TABLET | Refills: 3 | Status: SHIPPED | OUTPATIENT
Start: 2019-01-22 | End: 2019-06-05 | Stop reason: SDUPTHER

## 2019-01-22 RX ORDER — AMITRIPTYLINE HYDROCHLORIDE 25 MG/1
25 TABLET, FILM COATED ORAL NIGHTLY
Qty: 90 TABLET | Refills: 3 | Status: SHIPPED | OUTPATIENT
Start: 2019-01-22 | End: 2019-06-05 | Stop reason: SDUPTHER

## 2019-01-22 RX ORDER — LISINOPRIL 20 MG/1
20 TABLET ORAL DAILY
Qty: 90 TABLET | Refills: 3 | Status: SHIPPED | OUTPATIENT
Start: 2019-01-22 | End: 2019-06-05 | Stop reason: SDUPTHER

## 2019-01-22 RX ORDER — ERGOCALCIFEROL 1.25 MG/1
50000 CAPSULE ORAL WEEKLY
Qty: 12 CAPSULE | Refills: 3 | Status: SHIPPED | OUTPATIENT
Start: 2019-01-22 | End: 2019-06-05 | Stop reason: SDUPTHER

## 2019-01-22 RX ORDER — FLUCONAZOLE 100 MG/1
100 TABLET ORAL DAILY
Qty: 14 TABLET | Refills: 0 | Status: SHIPPED | OUTPATIENT
Start: 2019-01-22 | End: 2019-12-20 | Stop reason: SDUPTHER

## 2019-01-22 RX ORDER — LANCETS 30 GAUGE
1 EACH MISCELLANEOUS 3 TIMES DAILY
Qty: 300 EACH | Refills: 3 | Status: SHIPPED | OUTPATIENT
Start: 2019-01-22 | End: 2019-06-05 | Stop reason: SDUPTHER

## 2019-01-22 RX ORDER — GLUCOSAMINE HCL/CHONDROITIN SU 500-400 MG
1 CAPSULE ORAL 3 TIMES DAILY
Qty: 300 STRIP | Refills: 3 | Status: SHIPPED | OUTPATIENT
Start: 2019-01-22 | End: 2019-06-05 | Stop reason: SDUPTHER

## 2019-01-22 RX ORDER — NYSTATIN 100000 U/G
OINTMENT TOPICAL
Qty: 60 G | Refills: 2 | Status: SHIPPED | OUTPATIENT
Start: 2019-01-22 | End: 2019-12-20

## 2019-01-22 RX ORDER — ATORVASTATIN CALCIUM 40 MG/1
40 TABLET, FILM COATED ORAL DAILY
Qty: 90 TABLET | Refills: 3 | Status: SHIPPED | OUTPATIENT
Start: 2019-01-22 | End: 2019-06-05 | Stop reason: SDUPTHER

## 2019-01-30 DIAGNOSIS — E11.9 TYPE 2 DIABETES MELLITUS WITHOUT COMPLICATION, WITHOUT LONG-TERM CURRENT USE OF INSULIN (HCC): Primary | Chronic | ICD-10-CM

## 2019-02-21 ENCOUNTER — PROCEDURE VISIT (OUTPATIENT)
Dept: OBGYN CLINIC | Age: 44
End: 2019-02-21
Payer: COMMERCIAL

## 2019-02-21 VITALS
DIASTOLIC BLOOD PRESSURE: 72 MMHG | SYSTOLIC BLOOD PRESSURE: 122 MMHG | HEIGHT: 64 IN | BODY MASS INDEX: 46.61 KG/M2 | WEIGHT: 273 LBS

## 2019-02-21 DIAGNOSIS — R87.620 ASCUS WITH POSITIVE HIGH RISK HUMAN PAPILLOMAVIRUS OF VAGINA: Primary | ICD-10-CM

## 2019-02-21 DIAGNOSIS — R87.811 ASCUS WITH POSITIVE HIGH RISK HUMAN PAPILLOMAVIRUS OF VAGINA: Primary | ICD-10-CM

## 2019-02-21 LAB
HCG, URINE, POC: NEGATIVE
Lab: NORMAL
NEGATIVE QC PASS/FAIL: NORMAL
POSITIVE QC PASS/FAIL: NORMAL

## 2019-02-21 PROCEDURE — 57454 BX/CURETT OF CERVIX W/SCOPE: CPT | Performed by: OBSTETRICS & GYNECOLOGY

## 2019-02-21 PROCEDURE — 81025 URINE PREGNANCY TEST: CPT | Performed by: OBSTETRICS & GYNECOLOGY

## 2019-02-21 ASSESSMENT — ENCOUNTER SYMPTOMS
ALLERGIC/IMMUNOLOGIC NEGATIVE: 1
CONSTIPATION: 0
VOMITING: 0
EYES NEGATIVE: 1
NAUSEA: 0
DIARRHEA: 0
ANAL BLEEDING: 0
RECTAL PAIN: 0
BLOOD IN STOOL: 0
ABDOMINAL PAIN: 0
RESPIRATORY NEGATIVE: 1
ABDOMINAL DISTENTION: 0

## 2019-02-25 DIAGNOSIS — B37.31 VAGINAL CANDIDIASIS: Primary | Chronic | ICD-10-CM

## 2019-02-26 ENCOUNTER — CLINICAL DOCUMENTATION (OUTPATIENT)
Dept: OCCUPATIONAL THERAPY | Age: 44
End: 2019-02-26

## 2019-03-04 ENCOUNTER — OFFICE VISIT (OUTPATIENT)
Dept: FAMILY MEDICINE CLINIC | Age: 44
End: 2019-03-04
Payer: COMMERCIAL

## 2019-03-04 VITALS
BODY MASS INDEX: 46.95 KG/M2 | WEIGHT: 275 LBS | SYSTOLIC BLOOD PRESSURE: 124 MMHG | HEIGHT: 64 IN | HEART RATE: 98 BPM | TEMPERATURE: 98 F | DIASTOLIC BLOOD PRESSURE: 72 MMHG | RESPIRATION RATE: 16 BRPM | OXYGEN SATURATION: 98 %

## 2019-03-04 DIAGNOSIS — I10 ESSENTIAL HYPERTENSION: Chronic | ICD-10-CM

## 2019-03-04 DIAGNOSIS — E01.0 THYROMEGALY: Chronic | ICD-10-CM

## 2019-03-04 DIAGNOSIS — J06.9 VIRAL URI: ICD-10-CM

## 2019-03-04 DIAGNOSIS — E11.9 TYPE 2 DIABETES MELLITUS WITHOUT COMPLICATION, WITHOUT LONG-TERM CURRENT USE OF INSULIN (HCC): Primary | Chronic | ICD-10-CM

## 2019-03-04 PROCEDURE — G8482 FLU IMMUNIZE ORDER/ADMIN: HCPCS | Performed by: FAMILY MEDICINE

## 2019-03-04 PROCEDURE — G8417 CALC BMI ABV UP PARAM F/U: HCPCS | Performed by: FAMILY MEDICINE

## 2019-03-04 PROCEDURE — G8427 DOCREV CUR MEDS BY ELIG CLIN: HCPCS | Performed by: FAMILY MEDICINE

## 2019-03-04 PROCEDURE — 99214 OFFICE O/P EST MOD 30 MIN: CPT | Performed by: FAMILY MEDICINE

## 2019-03-04 PROCEDURE — 2022F DILAT RTA XM EVC RTNOPTHY: CPT | Performed by: FAMILY MEDICINE

## 2019-03-04 PROCEDURE — 1036F TOBACCO NON-USER: CPT | Performed by: FAMILY MEDICINE

## 2019-03-04 PROCEDURE — 3046F HEMOGLOBIN A1C LEVEL >9.0%: CPT | Performed by: FAMILY MEDICINE

## 2019-03-04 ASSESSMENT — PATIENT HEALTH QUESTIONNAIRE - PHQ9
SUM OF ALL RESPONSES TO PHQ QUESTIONS 1-9: 0
1. LITTLE INTEREST OR PLEASURE IN DOING THINGS: 0
SUM OF ALL RESPONSES TO PHQ9 QUESTIONS 1 & 2: 0
SUM OF ALL RESPONSES TO PHQ QUESTIONS 1-9: 0
2. FEELING DOWN, DEPRESSED OR HOPELESS: 0

## 2019-03-12 ENCOUNTER — TELEPHONE (OUTPATIENT)
Dept: FAMILY MEDICINE CLINIC | Age: 44
End: 2019-03-12

## 2019-03-12 DIAGNOSIS — F41.9 ANXIETY: Primary | Chronic | ICD-10-CM

## 2019-03-15 ENCOUNTER — HOSPITAL ENCOUNTER (OUTPATIENT)
Dept: WOMENS IMAGING | Age: 44
Discharge: HOME | End: 2019-03-17
Payer: COMMERCIAL

## 2019-03-15 DIAGNOSIS — Z12.31 SCREENING MAMMOGRAM, ENCOUNTER FOR: ICD-10-CM

## 2019-03-15 PROCEDURE — 77067 SCR MAMMO BI INCL CAD: CPT

## 2019-03-21 ENCOUNTER — OFFICE VISIT (OUTPATIENT)
Dept: OBGYN CLINIC | Age: 44
End: 2019-03-21
Payer: COMMERCIAL

## 2019-03-21 VITALS — WEIGHT: 279 LBS | SYSTOLIC BLOOD PRESSURE: 128 MMHG | DIASTOLIC BLOOD PRESSURE: 68 MMHG | BODY MASS INDEX: 47.89 KG/M2

## 2019-03-21 DIAGNOSIS — R87.810 ASCUS WITH POSITIVE HIGH RISK HPV CERVICAL: ICD-10-CM

## 2019-03-21 DIAGNOSIS — R87.610 ASCUS WITH POSITIVE HIGH RISK HPV CERVICAL: ICD-10-CM

## 2019-03-21 DIAGNOSIS — Z09 FOLLOW UP: Primary | ICD-10-CM

## 2019-03-21 PROCEDURE — G8417 CALC BMI ABV UP PARAM F/U: HCPCS | Performed by: OBSTETRICS & GYNECOLOGY

## 2019-03-21 PROCEDURE — 1036F TOBACCO NON-USER: CPT | Performed by: OBSTETRICS & GYNECOLOGY

## 2019-03-21 PROCEDURE — G8427 DOCREV CUR MEDS BY ELIG CLIN: HCPCS | Performed by: OBSTETRICS & GYNECOLOGY

## 2019-03-21 PROCEDURE — 99213 OFFICE O/P EST LOW 20 MIN: CPT | Performed by: OBSTETRICS & GYNECOLOGY

## 2019-03-21 PROCEDURE — G8482 FLU IMMUNIZE ORDER/ADMIN: HCPCS | Performed by: OBSTETRICS & GYNECOLOGY

## 2019-03-21 ASSESSMENT — ENCOUNTER SYMPTOMS
BLOOD IN STOOL: 0
EYES NEGATIVE: 1
ANAL BLEEDING: 0
ABDOMINAL DISTENTION: 0
DIARRHEA: 0
VOMITING: 0
RECTAL PAIN: 0
NAUSEA: 0
ABDOMINAL PAIN: 0
RESPIRATORY NEGATIVE: 1
CONSTIPATION: 0
ALLERGIC/IMMUNOLOGIC NEGATIVE: 1

## 2019-04-01 ENCOUNTER — OFFICE VISIT (OUTPATIENT)
Dept: FAMILY MEDICINE CLINIC | Age: 44
End: 2019-04-01
Payer: COMMERCIAL

## 2019-04-01 VITALS
BODY MASS INDEX: 46.61 KG/M2 | WEIGHT: 273 LBS | OXYGEN SATURATION: 99 % | HEIGHT: 64 IN | HEART RATE: 118 BPM | DIASTOLIC BLOOD PRESSURE: 78 MMHG | RESPIRATION RATE: 15 BRPM | SYSTOLIC BLOOD PRESSURE: 128 MMHG | TEMPERATURE: 97.8 F

## 2019-04-01 DIAGNOSIS — R73.09 BLOOD GLUCOSE ABNORMAL: ICD-10-CM

## 2019-04-01 DIAGNOSIS — K52.9 ACUTE GASTROENTERITIS: Primary | ICD-10-CM

## 2019-04-01 DIAGNOSIS — R11.2 INTRACTABLE VOMITING WITH NAUSEA, UNSPECIFIED VOMITING TYPE: ICD-10-CM

## 2019-04-01 LAB — GLUCOSE BLD-MCNC: 337 MG/DL

## 2019-04-01 PROCEDURE — G8417 CALC BMI ABV UP PARAM F/U: HCPCS | Performed by: NURSE PRACTITIONER

## 2019-04-01 PROCEDURE — 99213 OFFICE O/P EST LOW 20 MIN: CPT | Performed by: NURSE PRACTITIONER

## 2019-04-01 PROCEDURE — G8427 DOCREV CUR MEDS BY ELIG CLIN: HCPCS | Performed by: NURSE PRACTITIONER

## 2019-04-01 PROCEDURE — 82962 GLUCOSE BLOOD TEST: CPT | Performed by: NURSE PRACTITIONER

## 2019-04-01 PROCEDURE — 1036F TOBACCO NON-USER: CPT | Performed by: NURSE PRACTITIONER

## 2019-04-01 RX ORDER — ONDANSETRON 4 MG/1
4 TABLET, ORALLY DISINTEGRATING ORAL 3 TIMES DAILY PRN
Qty: 21 TABLET | Refills: 0 | Status: SHIPPED | OUTPATIENT
Start: 2019-04-01 | End: 2022-05-19 | Stop reason: ALTCHOICE

## 2019-04-01 ASSESSMENT — ENCOUNTER SYMPTOMS
DIARRHEA: 1
ABDOMINAL PAIN: 1

## 2019-04-01 NOTE — PROGRESS NOTES
[address] [facility], 38 y.o. female presents today with:  Chief Complaint   Patient presents with    Abdominal Pain     diarrhea    Headache     x1day       Abdominal Pain   This is a new problem. The onset quality is sudden. The problem occurs intermittently. The problem has been gradually improving. The pain is located in the generalized abdominal region. The pain is moderate. The quality of the pain is cramping and aching. The abdominal pain does not radiate. Associated symptoms include anorexia, diarrhea, headaches and myalgias. Pertinent negatives include no arthralgias, belching, constipation, dysuria, fever, flatus, frequency, hematochezia, hematuria, melena, nausea, vomiting or weight loss. The pain is aggravated by eating. The pain is relieved by nothing. She has tried nothing for the symptoms. Review of Systems   Constitutional: Positive for appetite change (decreased) and fatigue. Negative for chills, diaphoresis, fever and weight loss. HENT: Negative for trouble swallowing. Respiratory: Negative for chest tightness and shortness of breath. Cardiovascular: Negative for chest pain. Gastrointestinal: Positive for abdominal pain, anorexia and diarrhea. Negative for abdominal distention, anal bleeding, blood in stool, constipation, flatus, hematochezia, melena, nausea, rectal pain and vomiting. Genitourinary: Negative for difficulty urinating, dysuria, frequency and hematuria. Musculoskeletal: Positive for myalgias. Negative for arthralgias and back pain. Skin: Negative for pallor and rash. Neurological: Positive for headaches. Negative for dizziness and light-headedness.        Objective    Vitals:    04/01/19 1718   BP: 128/78   Site: Left Upper Arm   Position: Sitting   Cuff Size: Large Adult   Pulse: 118   Resp: 15   Temp: 97.8 °F (36.6 °C)   TempSrc: Temporal   SpO2: 99%   Weight: 273 lb (123.8 kg)   Height: 5' 4\" (1.626 m)       Physical Exam   Constitutional: She is oriented to person, place, and time. She appears well-developed and well-nourished. She is active and cooperative. No distress. HENT:   Head: Normocephalic and atraumatic. Right Ear: External ear normal.   Left Ear: External ear normal.   Eyes: Conjunctivae, EOM and lids are normal.   Neck: Normal range of motion. Neck supple. Cardiovascular: Normal rate, regular rhythm, S1 normal, S2 normal and normal heart sounds. Pulmonary/Chest: Effort normal and breath sounds normal. No respiratory distress. Abdominal: Soft. Normal appearance and bowel sounds are normal. She exhibits no distension and no mass. There is no hepatosplenomegaly. There is generalized tenderness. There is no rigidity, no rebound, no guarding, no CVA tenderness, no tenderness at McBurney's point and negative Hummel's sign. No hernia. Musculoskeletal: Normal range of motion. Neurological: She is alert and oriented to person, place, and time. She has normal strength. Coordination and gait normal.   Skin: Skin is warm and dry. No petechiae and no rash noted. She is not diaphoretic. Psychiatric: She has a normal mood and affect. Her speech is normal and behavior is normal.   Nursing note and vitals reviewed. POC Testing Today:   Results for POC orders placed in visit on 04/01/19   POCT Glucose   Result Value Ref Range    Glucose 337 mg/dL       Assessment & Plan    Diagnosis Orders   1. Acute gastroenteritis     2. Intractable vomiting with nausea, unspecified vomiting type  ondansetron (ZOFRAN-ODT) 4 MG disintegrating tablet   3. Blood glucose abnormal  POCT Glucose     Advised Idalia to closely monitor her BG over the next few days,and go to ER if level continues to rise. Focus on oral hydration at home. Gastroenteritis: Symptoms and exam are consistent with viral gastroenteritis. This is usually a self-limited illness lasting 24-48 hours. Drink clear liquids tonight and tomorrow morning, such water, Jello, and broth.   Can advance to [employer] tomorrow as tolerated. Make sure to wash your hands thoroughly, and do not share drinks, utensils, etc.  Seek emergency care if unable to hold any fluids down at all and signs of dehydration develop. F/U with PCP if symptoms worsen or fail to improve in next 2-3 days. Return if symptoms worsen or fail to improve, for follow-up with PCP. Side effects and adverse effects of any medication prescribed today, as well as treatment plan/rationale,follow-up care, and result expectations have been discussed with the patient. Expresses understanding and desires to proceed with treatment plan. Discussed signs and symptoms which require immediate follow-up in ED/call to 911. Understanding verbalized. I have reviewed and updated the electronic medical record.     KAIN Saunders - NP

## 2019-04-01 NOTE — PATIENT INSTRUCTIONS
Patient Education        Gastroenteritis: Care Instructions  Your Care Instructions    Gastroenteritis is an illness that may cause nausea, vomiting, and diarrhea. It is sometimes called \"stomach flu. \" It can be caused by bacteria or a virus. You will probably begin to feel better in 1 to 2 days. In the meantime, get plenty of rest and make sure you do not become dehydrated. Dehydration occurs when your body loses too much fluid. Follow-up care is a key part of your treatment and safety. Be sure to make and go to all appointments, and call your doctor if you are having problems. It's also a good idea to know your test results and keep a list of the medicines you take. How can you care for yourself at home? · If your doctor prescribed antibiotics, take them as directed. Do not stop taking them just because you feel better. You need to take the full course of antibiotics. · Drink plenty of fluids to prevent dehydration, enough so that your urine is light yellow or clear like water. Choose water and other caffeine-free clear liquids until you feel better. If you have kidney, heart, or liver disease and have to limit fluids, talk with your doctor before you increase your fluid intake. · Drink fluids slowly, in frequent, small amounts, because drinking too much too fast can cause vomiting. · Begin eating mild foods, such as dry toast, yogurt, applesauce, bananas, and rice. Avoid spicy, hot, or high-fat foods, and do not drink alcohol or caffeine for a day or two. Do not drink milk or eat ice cream until you are feeling better. How to prevent gastroenteritis  · Keep hot foods hot and cold foods cold. · Do not eat meats, dressings, salads, or other foods that have been kept at room temperature for more than 2 hours. · Use a thermometer to check your refrigerator. It should be between 34°F and 40°F.  · Defrost meats in the refrigerator or microwave, not on the kitchen counter.   · Keep your hands and your kitchen clean. Wash your hands, cutting boards, and countertops with hot soapy water frequently. · Cook meat until it is well done. · Do not eat raw eggs or uncooked sauces made with raw eggs. · Do not take chances. If food looks or tastes spoiled, throw it out. When should you call for help? Call 911 anytime you think you may need emergency care. For example, call if:    · You vomit blood or what looks like coffee grounds.     · You passed out (lost consciousness).     · You pass maroon or very bloody stools.    Call your doctor now or seek immediate medical care if:    · You have severe belly pain.     · You have signs of needing more fluids. You have sunken eyes, a dry mouth, and pass only a little dark urine.     · You feel like you are going to faint.     · You have increased belly pain that does not go away in 1 to 2 days.     · You have new or increased nausea, or you are vomiting.     · You have a new or higher fever.     · Your stools are black and tarlike or have streaks of blood.    Watch closely for changes in your health, and be sure to contact your doctor if:    · You are dizzy or lightheaded.     · You urinate less than usual, or your urine is dark yellow or brown.     · You do not feel better with each day that goes by. Where can you learn more? Go to https://[email or web address] org and sign in to your [employer] account. Enter N142 in the [facility] box to learn more about \"Gastroenteritis: Care Instructions. \"     If you do not have an account, please click on the \"Sign Up Now\" link. Current as of: July 30, 2018  Content Version: 11.9  © 7586-1435 [employer]. Care instructions adapted under license by [facility] ([facility]). If you have questions about a medical condition or this instruction, always ask your healthcare professional. [address]  any warranty or liability for your use of this information.

## 2019-04-03 ENCOUNTER — OFFICE VISIT (OUTPATIENT)
Dept: PHYSICAL MEDICINE AND REHAB | Age: 44
End: 2019-04-03
Payer: COMMERCIAL

## 2019-04-03 VITALS
WEIGHT: 273 LBS | DIASTOLIC BLOOD PRESSURE: 68 MMHG | HEIGHT: 64 IN | BODY MASS INDEX: 46.61 KG/M2 | SYSTOLIC BLOOD PRESSURE: 98 MMHG

## 2019-04-03 DIAGNOSIS — R29.898 BILATERAL ARM WEAKNESS: ICD-10-CM

## 2019-04-03 DIAGNOSIS — M54.32 BILATERAL SCIATICA: ICD-10-CM

## 2019-04-03 DIAGNOSIS — G89.29 CHRONIC PAIN OF BOTH KNEES: ICD-10-CM

## 2019-04-03 DIAGNOSIS — M79.602 PAIN IN BOTH UPPER EXTREMITIES: ICD-10-CM

## 2019-04-03 DIAGNOSIS — M25.562 CHRONIC PAIN OF BOTH KNEES: ICD-10-CM

## 2019-04-03 DIAGNOSIS — Z79.899 HIGH RISK MEDICATION USE: Chronic | ICD-10-CM

## 2019-04-03 DIAGNOSIS — M54.2 NECK PAIN: Primary | ICD-10-CM

## 2019-04-03 DIAGNOSIS — M79.601 PAIN IN BOTH UPPER EXTREMITIES: ICD-10-CM

## 2019-04-03 DIAGNOSIS — G47.30 SLEEP APNEA, UNSPECIFIED TYPE: ICD-10-CM

## 2019-04-03 DIAGNOSIS — M79.7 FIBROMYALGIA: Chronic | ICD-10-CM

## 2019-04-03 DIAGNOSIS — M54.31 BILATERAL SCIATICA: ICD-10-CM

## 2019-04-03 DIAGNOSIS — M19.90 INFLAMMATORY ARTHRITIS: ICD-10-CM

## 2019-04-03 DIAGNOSIS — M25.561 CHRONIC PAIN OF BOTH KNEES: ICD-10-CM

## 2019-04-03 DIAGNOSIS — F41.9 ANXIETY: Chronic | ICD-10-CM

## 2019-04-03 DIAGNOSIS — E55.9 VITAMIN D DEFICIENCY: Chronic | ICD-10-CM

## 2019-04-03 PROCEDURE — 1036F TOBACCO NON-USER: CPT | Performed by: PHYSICAL MEDICINE & REHABILITATION

## 2019-04-03 PROCEDURE — G8417 CALC BMI ABV UP PARAM F/U: HCPCS | Performed by: PHYSICAL MEDICINE & REHABILITATION

## 2019-04-03 PROCEDURE — G8427 DOCREV CUR MEDS BY ELIG CLIN: HCPCS | Performed by: PHYSICAL MEDICINE & REHABILITATION

## 2019-04-03 PROCEDURE — 99215 OFFICE O/P EST HI 40 MIN: CPT | Performed by: PHYSICAL MEDICINE & REHABILITATION

## 2019-04-03 ASSESSMENT — ENCOUNTER SYMPTOMS
SWOLLEN GLANDS: 0
ABDOMINAL PAIN: 0
SINUS PRESSURE: 0
CHANGE IN BOWEL HABIT: 0
SINUS PAIN: 0
SORE THROAT: 0
BOWEL INCONTINENCE: 0
EYE PAIN: 0
BACK PAIN: 0
RHINORRHEA: 0
CHEST TIGHTNESS: 0
CONSTIPATION: 0
DIARRHEA: 0
CHOKING: 0
SHORTNESS OF BREATH: 0
APNEA: 1
VISUAL CHANGE: 0
COUGH: 0
VOMITING: 0

## 2019-04-03 NOTE — PROGRESS NOTES
[address] [facility], 38 y.o. female presents today with:    Back Pain OT@ [facility] 3 session 1 hr which did bring relief but the cost of co-pay and deductible was too high     Neck Pain Tens Unit 20 minutes daily it does help. Headaches 2 weekly    Leg Pain Right    Hip Pain Bilateral mostly right hip    Hand Pain Bilateral swelling and painful    Discuss Medications Would like to schedule for injections for neck , possibly back. Would like to talk about rehab she has different insurance now also interested in a back brace       Patient is here again for follow-up unfortunately she has not done any of her injections did not go to OT and did not get her labs done. Having increased pain in neck and hands--with numbness. RO RA and CTS. Arm Pain    The incident occurred more than 1 week ago. The injury mechanism was twisted. The pain is present in the upper right arm. The quality of the pain is described as stabbing. The pain is at a severity of 6/10. The pain is severe. The pain has been intermittent since the incident. Associated symptoms include muscle weakness and numbness. Pertinent negatives include no chest pain. The symptoms are aggravated by palpation, lifting and movement. She has tried acetaminophen, heat, NSAIDs and rest for the symptoms. The treatment provided mild relief. Extremity Weakness   This is a recurrent problem. The current episode started more than 1 year ago. The problem occurs constantly. The problem has been unchanged. Associated symptoms include fatigue, headaches, myalgias, neck pain, numbness, a rash, urinary symptoms and weakness. Pertinent negatives include no abdominal pain, anorexia, arthralgias, change in bowel habit, chest pain, chills, congestion, coughing, fever, joint swelling, sore throat, swollen glands, vertigo, visual change or vomiting.  She has tried ice, heat, acetaminophen, relaxation, position changes, oral narcotics, walking, sleep and rest for the symptoms. The treatment provided mild relief. Leg Pain    The incident occurred more than 1 week ago. The injury mechanism is unknown. Associated symptoms include muscle weakness and numbness. She reports no foreign bodies present. The symptoms are aggravated by movement, palpation and weight bearing. She has tried immobilization, heat, elevation, ice, acetaminophen and rest for the symptoms. The treatment provided mild relief. Back Pain   This is a chronic problem. The current episode started more than 1 year ago. The problem occurs constantly. The problem has been gradually worsening since onset. The pain is present in the thoracic spine and lumbar spine. The quality of the pain is described as aching. The pain does not radiate. The pain is at a severity of 8/10. Associated symptoms include headaches, leg pain, numbness and weakness. Pertinent negatives include no abdominal pain, bladder incontinence, bowel incontinence, chest pain, dysuria, fever, pelvic pain or perianal numbness. Risk factors include lack of exercise and sedentary lifestyle. Past Medical History:   Diagnosis Date    Abnormal Pap smear of cervix     Anxiety 7/10/2018    Anxiety     ASCUS with positive high risk HPV cervical 12/03/2018    Diabetes type 2, uncontrolled ([address]     Essential hypertension 5/29/2018    Fibromyalgia 5/29/2018    Herpes     HPV in female 12/03/2018    HPV 18    Hyperlipidemia     Obesity     Sleep apnea     Thyroid disease      No past surgical history on file.   Social History     Socioeconomic History    Marital status:      Spouse name: None    Number of children: None    Years of education: None    Highest education level: None   Occupational History    Occupation: [employer]   Social Needs    Financial resource strain: None    Food insecurity:     Worry: None     Inability: None    Transportation needs:     Medical: None     Non-medical: None   Tobacco Use    Smoking status: Never Smoker    Smokeless tobacco: Never Used   Substance and Sexual Activity    Alcohol use: No    Drug use: No    Sexual activity: Yes     Partners: Male     Comment: No BC; primary infertility   Lifestyle    Physical activity:     Days per week: None     Minutes per session: None    Stress: None   Relationships    Social connections:     Talks on phone: None     Gets together: None     Attends [employer] service: None     Active member of club or organization: None     Attends meetings of clubs or organizations: None     Relationship status: None    Intimate partner violence:     Fear of current or ex partner: None     Emotionally abused: None     Physically abused: None     Forced sexual activity: None   Other Topics Concern    None   Social History Narrative    None     Family History   Problem Relation Age of Onset    Diabetes Father     High Blood Pressure Father     Retinal Detachment Father     Cancer Father         skin    Thyroid Disease Mother     Kidney Disease Sister     Inflam Bowel Dis Brother     No Known Problems Sister     Other Brother     No Known Problems Paternal Grandfather     No Known Problems Paternal Grandmother     No Known Problems Maternal Grandmother     No Known Problems Maternal Grandfather     No Known Problems Other     Breast Cancer Neg Hx     Colon Cancer Neg Hx     Eclampsia Neg Hx     Hypertension Neg Hx     Ovarian Cancer Neg Hx      Labor Neg Hx     Spont Abortions Neg Hx     Stroke Neg Hx        Allergies   Allergen Reactions    Niacin And Related Swelling and Rash       Review of Systems   Constitutional: Positive for activity change and fatigue. Negative for appetite change, chills, fever and unexpected weight change. HENT: Negative for congestion, postnasal drip, rhinorrhea, sinus pressure, sinus pain and sore throat. Eyes: Negative for pain. Respiratory: Positive for apnea.  Negative for cough, choking, chest tightness and Neck supple. No JVD present. No neck rigidity. No tracheal deviation and no edema present. No thyromegaly present. Cardiovascular: Intact distal pulses. Exam reveals no decreased pulses. Pulmonary/Chest: Effort normal. No accessory muscle usage. No apnea, no tachypnea and no bradypnea. No respiratory distress. She has decreased breath sounds. She has no wheezes. She has no rales. She exhibits no tenderness. Abdominal: Soft. Bowel sounds are normal. She exhibits no distension and no mass. There is no tenderness. There is no rebound and no guarding. Musculoskeletal: She exhibits tenderness. She exhibits no edema. Right shoulder: Normal.        Left shoulder: Normal.        Right elbow: Normal.       Left elbow: Normal.        Right wrist: Normal.        Left wrist: Normal.        Right hip: Normal.        Left hip: Normal.        Right knee: Normal.        Left knee: Normal.        Right ankle: Normal. Achilles tendon normal.        Left ankle: Normal. Achilles tendon normal.        Cervical back: Normal.        Thoracic back: Normal.        Lumbar back: She exhibits decreased range of motion, tenderness, bony tenderness and pain. She exhibits no swelling, no edema, no deformity, no laceration and normal pulse. Right upper arm: Normal.        Left upper arm: Normal.        Right forearm: Normal.        Left forearm: Normal.        Right hand: Normal.        Left hand: Normal.        Right upper leg: Normal.        Left upper leg: Normal.        Right lower leg: Normal.        Left lower leg: Normal.        Right foot: Normal.        Left foot: Normal.   Tender areas are indicated by numbered spot         Lymphadenopathy:     She has no cervical adenopathy. She has no axillary adenopathy. Right: No inguinal adenopathy present. Left: No inguinal adenopathy present. Neurological: She is alert and oriented to person, place, and time. She has normal strength.  She displays abnormal reflex. She displays no atrophy and no tremor. A sensory deficit is present. No cranial nerve deficit. She exhibits normal muscle tone. Gait abnormal. Coordination normal. She displays no Babinski's sign on the right side. She displays no Babinski's sign on the left side. Reflex Scores:       Tricep reflexes are 2+ on the right side and 2+ on the left side. Bicep reflexes are 2+ on the right side and 2+ on the left side. Brachioradialis reflexes are 2+ on the right side and 2+ on the left side. Patellar reflexes are 2+ on the right side and 2+ on the left side. Achilles reflexes are 1+ on the right side and 1+ on the left side. Skin: Skin is warm, dry and intact. No abrasion, no bruising, no ecchymosis, no laceration, no petechiae and no rash noted. Rash is not macular, not pustular and not urticarial. She is not diaphoretic. No cyanosis or erythema. No pallor. Nails show no clubbing. Psychiatric: She has a normal mood and affect. Her behavior is normal. Judgment and thought content normal. Her mood appears not anxious. Her affect is not angry, not blunt, not labile and not inappropriate. Her speech is not rapid and/or pressured, not delayed, not tangential and not slurred. She is not agitated, not aggressive, not hyperactive, not slowed, not withdrawn, not actively hallucinating and not combative. Thought content is not paranoid and not delusional. Cognition and memory are normal. Cognition and memory are not impaired. She does not express impulsivity or inappropriate judgment. She does not exhibit a depressed mood. She expresses no homicidal and no suicidal ideation. She expresses no suicidal plans and no homicidal plans. She is communicative. She exhibits normal recent memory and normal remote memory. She is attentive. Vitals reviewed. Ortho Exam  Neurologic Exam     Mental Status   Oriented to person, place, and time.    Speech: not slurred   Level of consciousness: alert  Knowledge: good. Able to name object. Able to repeat. Able to write. Normal comprehension. Cranial Nerves     CN III, IV, VI   Pupils are equal, round, and reactive to light. Extraocular motions are normal.     Motor Exam     Strength   Strength 5/5 throughout. Gait, Coordination, and Reflexes     Reflexes   Right brachioradialis: 2+  Left brachioradialis: 2+  Right biceps: 2+  Left biceps: 2+  Right triceps: 2+  Left triceps: 2+  Right patellar: 2+  Left patellar: 2+  Right achilles: 1+  Left achilles: 1+        After a thorough review and discussion of the previous medical records, patient comprehensive medical, surgical, and family and social history, Review of Systems, their OARRS, their Screener and Opioid Assessment for Patients with Pain (SOAPP®-R), recent diagnostics, and symptomatic results to previous treatment, it is my impression that the patients is suffering with progressive and severe:     Diagnosis Orders   1. Neck pain  [address]   2. Pain in both upper extremities  [address]   3. Sleep apnea, unspecified type     4. Vitamin D deficiency  Vitamin D 25 Hydroxy   5. Fibromyalgia  [facility] [place]    [place] INJECT TRIGGER POINTS, 3 OR GREATER    [place] INJECT TRIGGER POINTS, 3 OR GREATER    [place]   6. High risk medication use  Urine Drug Screen   7. Chronic pain of both knees  [address]   8. Anxiety     9. Bilateral arm weakness  [address]   10. Bilateral sciatica  [place] [facility] [address]   11.  Inflammatory arthritis  Rheumatoid Factor    WEND[facility]    C-Reactive Protein       I am also concerned by lifestyle and mood issues including:    Past Medical History:   Diagnosis Date    Abnormal Pap smear of cervix     Anxiety 7/10/2018    Anxiety     ASCUS with positive high risk HPV cervical 12/03/2018    Diabetes type 2, uncontrolled ([facility] [address]     Essential hypertension 5/29/2018    Fibromyalgia 5/29/2018    Herpes     HPV in female 12/03/2018    HPV 18    Hyperlipidemia     Obesity     Sleep apnea     Thyroid disease            Given their medication, chronic pain and lifestyle and medications they are at risk for :    Falls, constipation, addiction  Loss of livelyhood due to severe pain, debility, weight gain and  vitamin D deficiency    The patient was educated regarding proper diet, fitness routine, and regulatory restrictions concerning pain medications. Previous notes, comprehensive past medical, surgical, family history, and diagnostics were reviewed. Patient education and councelling were provided regarding off label use,treatment options and medication and injection risks. Current and old OARRS ([place] Automated Prescription Reporting System) records reviewed, all refills reviewed since last visit,  Behavioral agreement/RAMON regulations   and Toxicology screen was reviewed with patient and is up to date. There are no current red flags. They are making good progress regarding pain relief, they are performing at a functional level regarding activities of daily living, family interactions and psychological functioning, they're not having any adverse effects or side effects from the current medications, and I see no findings of aberrant drug taking or addiction related behaviors. The patient is aware that they have a chronic pain condition and they may require opiates dosing for life. All efforts will be made to wean to the lowest effective dose. Other therapies for pain have not been effective including nonopiate medications. Injections and exercises are only partially effective. A Rx for Narcan was offered to help prevent accidental overdose.     Emilia Palomares Monitoring 4/3/2019   Attestation The Prescription Monitoring Report for this patient was reviewed today. Chronic Pain Routine Monitoring No signs of potential drug abuse or diversion identified: otherwise, see note documentation;Obtaining appropriate analgesic effect of treatment. ;Random urine drug screen sent today. ;Possible medication side effects, risk of tolerance/dependence & alternative treatments discussed. Chronic Pain > 50 MEDD Obtained or confirmened \"Consent of Opioid Use\" on file.;Considered consultation with a specialist.;Re-evaluated the status of the patient's underlying condition causing pain. Chronic Pain > 80 MEDD -       Attestation: The Prescription Monitoring Report for this patient was reviewed today. (Anupam Mathur, )  Chronic Pain Routine Monitoring: No signs of potential drug abuse or diversion identified: otherwise, see note documentation, Obtaining appropriate analgesic effect of treatment. , Random urine drug screen sent today. , Possible medication side effects, risk of tolerance/dependence & alternative treatments discussed. (Anupam Mathur, )       Patient is currently taking:       I am having Syble Fillers. Hans maintain her ONE TOUCH ULTRA 2, blood glucose monitor kit and supplies, triamcinolone, amitriptyline, Dulaglutide, insulin glargine, aspirin, atorvastatin, blood glucose test strips, fenofibrate, Lancets, lisinopril, vitamin D, nystatin, Insulin Pen Needle, insulin lispro, and ondansetron. I also recommend the following Medications:    No orders of the defined types were placed in this encounter.       -which helps with pain and function. Otherwise, continue the current pain medications that I have prescibed.       Radiologic:   Old films reviewed-  6 views of the lumbar spine       CLINICAL HISTORY Slipped and fell, trauma, pain       FINDINGS No acute compression fracture is seen.  No subluxation is   noted.  The oblique images reveal no evidence of 4/3/19 score: 9 - moderate risk            Injections or Epidurals:  Injection options were discussed. TPs and sciatics. Patient gave verbal consent to ordered injections. See follow-up plans for planned injections. Supplements:  Vitamin D with increased dosing during the winter months,   Education was given on:   Dietary and Fitness--daily stretches and low carb diet             Follow up with Primary Care Physician regarding their general medical needs. They are to follow up in 2 months to review medication, efficacy of injections, pill counts, OARRS check, SOAPPR assessment, review diagnostics, to review previous and future treatment plans and assess appropriateness for continued therapy.         New Diagnostics  Orders Placed This Encounter   Procedures    Urine Drug Screen    Vitamin D 25 Hydroxy    Rheumatoid Factor    WENDY    C-Reactive Protein    [facility] - [address] [facility] [place]    [place] INJECT TRIGGER POINTS, 3 OR GREATER    [place] INJECT TRIGGER POINTS, 3 OR GREATER    [place] [facility] [address], [place]

## 2019-04-11 ASSESSMENT — ENCOUNTER SYMPTOMS
SHORTNESS OF BREATH: 0
BACK PAIN: 0
BLOOD IN STOOL: 0
BELCHING: 0
TROUBLE SWALLOWING: 0
RECTAL PAIN: 0
ANAL BLEEDING: 0
NAUSEA: 0
CONSTIPATION: 0
ABDOMINAL DISTENTION: 0
CHEST TIGHTNESS: 0
VOMITING: 0
FLATUS: 0
HEMATOCHEZIA: 0

## 2019-05-15 ENCOUNTER — OFFICE VISIT (OUTPATIENT)
Dept: PHYSICAL MEDICINE AND REHAB | Age: 44
End: 2019-05-15
Payer: COMMERCIAL

## 2019-05-15 DIAGNOSIS — G56.03 BILATERAL CARPAL TUNNEL SYNDROME: ICD-10-CM

## 2019-05-15 DIAGNOSIS — R29.898 BILATERAL ARM WEAKNESS: Primary | ICD-10-CM

## 2019-05-15 PROCEDURE — 99999 PR OFFICE/OUTPT VISIT,PROCEDURE ONLY: CPT | Performed by: PHYSICAL MEDICINE & REHABILITATION

## 2019-05-15 PROCEDURE — 95912 NRV CNDJ TEST 11-12 STUDIES: CPT | Performed by: PHYSICAL MEDICINE & REHABILITATION

## 2019-05-15 PROCEDURE — 95886 MUSC TEST DONE W/N TEST COMP: CPT | Performed by: PHYSICAL MEDICINE & REHABILITATION

## 2019-05-22 ENCOUNTER — OFFICE VISIT (OUTPATIENT)
Dept: PHYSICAL MEDICINE AND REHAB | Age: 44
End: 2019-05-22
Payer: COMMERCIAL

## 2019-05-22 DIAGNOSIS — M79.602 PAIN IN BOTH UPPER EXTREMITIES: ICD-10-CM

## 2019-05-22 DIAGNOSIS — G56.03 BILATERAL CARPAL TUNNEL SYNDROME: Primary | ICD-10-CM

## 2019-05-22 DIAGNOSIS — G57.93 NEUROPATHIC PAIN OF BOTH LEGS: ICD-10-CM

## 2019-05-22 DIAGNOSIS — M54.17 RADICULOPATHY, LUMBOSACRAL REGION: ICD-10-CM

## 2019-05-22 DIAGNOSIS — R29.898 BILATERAL ARM WEAKNESS: ICD-10-CM

## 2019-05-22 DIAGNOSIS — M79.601 PAIN IN BOTH UPPER EXTREMITIES: ICD-10-CM

## 2019-05-22 PROCEDURE — 95886 MUSC TEST DONE W/N TEST COMP: CPT | Performed by: PHYSICAL MEDICINE & REHABILITATION

## 2019-05-22 PROCEDURE — 99999 PR OFFICE/OUTPT VISIT,PROCEDURE ONLY: CPT | Performed by: PHYSICAL MEDICINE & REHABILITATION

## 2019-05-22 PROCEDURE — 95913 NRV CNDJ TEST 13/> STUDIES: CPT | Performed by: PHYSICAL MEDICINE & REHABILITATION

## 2019-05-29 ENCOUNTER — PROCEDURE VISIT (OUTPATIENT)
Dept: PHYSICAL MEDICINE AND REHAB | Age: 44
End: 2019-05-29
Payer: COMMERCIAL

## 2019-05-29 DIAGNOSIS — M79.7 FIBROMYALGIA: Primary | ICD-10-CM

## 2019-05-29 PROCEDURE — 20553 NJX 1/MLT TRIGGER POINTS 3/>: CPT | Performed by: PHYSICAL MEDICINE & REHABILITATION

## 2019-05-29 RX ORDER — LIDOCAINE HYDROCHLORIDE 10 MG/ML
16 INJECTION, SOLUTION INFILTRATION; PERINEURAL ONCE
Status: COMPLETED | OUTPATIENT
Start: 2019-05-29 | End: 2019-05-29

## 2019-05-29 RX ADMIN — LIDOCAINE HYDROCHLORIDE 16 ML: 10 INJECTION, SOLUTION INFILTRATION; PERINEURAL at 13:09

## 2019-06-05 ENCOUNTER — OFFICE VISIT (OUTPATIENT)
Dept: FAMILY MEDICINE CLINIC | Age: 44
End: 2019-06-05
Payer: COMMERCIAL

## 2019-06-05 VITALS
TEMPERATURE: 98.4 F | HEIGHT: 64 IN | WEIGHT: 276 LBS | BODY MASS INDEX: 47.12 KG/M2 | DIASTOLIC BLOOD PRESSURE: 70 MMHG | SYSTOLIC BLOOD PRESSURE: 118 MMHG | RESPIRATION RATE: 16 BRPM | HEART RATE: 84 BPM | OXYGEN SATURATION: 98 %

## 2019-06-05 DIAGNOSIS — E55.9 VITAMIN D DEFICIENCY: Chronic | ICD-10-CM

## 2019-06-05 DIAGNOSIS — E11.9 TYPE 2 DIABETES MELLITUS WITHOUT COMPLICATION, WITHOUT LONG-TERM CURRENT USE OF INSULIN (HCC): Primary | Chronic | ICD-10-CM

## 2019-06-05 DIAGNOSIS — F41.9 ANXIETY: Chronic | ICD-10-CM

## 2019-06-05 DIAGNOSIS — E78.00 PURE HYPERCHOLESTEROLEMIA: ICD-10-CM

## 2019-06-05 DIAGNOSIS — F32.9 REACTIVE DEPRESSION: Chronic | ICD-10-CM

## 2019-06-05 DIAGNOSIS — K59.00 CONSTIPATION, UNSPECIFIED CONSTIPATION TYPE: ICD-10-CM

## 2019-06-05 DIAGNOSIS — I10 ESSENTIAL HYPERTENSION: Chronic | ICD-10-CM

## 2019-06-05 PROCEDURE — 99215 OFFICE O/P EST HI 40 MIN: CPT | Performed by: FAMILY MEDICINE

## 2019-06-05 PROCEDURE — 2022F DILAT RTA XM EVC RTNOPTHY: CPT | Performed by: FAMILY MEDICINE

## 2019-06-05 PROCEDURE — 1036F TOBACCO NON-USER: CPT | Performed by: FAMILY MEDICINE

## 2019-06-05 PROCEDURE — 3046F HEMOGLOBIN A1C LEVEL >9.0%: CPT | Performed by: FAMILY MEDICINE

## 2019-06-05 PROCEDURE — G8427 DOCREV CUR MEDS BY ELIG CLIN: HCPCS | Performed by: FAMILY MEDICINE

## 2019-06-05 PROCEDURE — G8417 CALC BMI ABV UP PARAM F/U: HCPCS | Performed by: FAMILY MEDICINE

## 2019-06-05 RX ORDER — BISACODYL 10 MG
10 SUPPOSITORY, RECTAL RECTAL
Qty: 10 SUPPOSITORY | Refills: 0 | Status: SHIPPED | OUTPATIENT
Start: 2019-06-05 | End: 2019-07-05

## 2019-06-05 RX ORDER — AMITRIPTYLINE HYDROCHLORIDE 25 MG/1
25 TABLET, FILM COATED ORAL NIGHTLY
Qty: 90 TABLET | Refills: 1 | Status: SHIPPED | OUTPATIENT
Start: 2019-06-05 | End: 2021-12-03 | Stop reason: ALTCHOICE

## 2019-06-05 RX ORDER — SODIUM PHOSPHATE, DIBASIC AND SODIUM PHOSPHATE, MONOBASIC 7; 19 G/133ML; G/133ML
1 ENEMA RECTAL
Qty: 4 BOTTLE | Refills: 0 | Status: SHIPPED | OUTPATIENT
Start: 2019-06-05

## 2019-06-05 RX ORDER — LANCETS 30 GAUGE
1 EACH MISCELLANEOUS 3 TIMES DAILY
Qty: 300 EACH | Refills: 3 | Status: SHIPPED | OUTPATIENT
Start: 2019-06-05

## 2019-06-05 RX ORDER — FENOFIBRATE 160 MG/1
160 TABLET ORAL DAILY
Qty: 90 TABLET | Refills: 3 | Status: SHIPPED | OUTPATIENT
Start: 2019-06-05 | End: 2021-12-03 | Stop reason: SDUPTHER

## 2019-06-05 RX ORDER — ERGOCALCIFEROL 1.25 MG/1
50000 CAPSULE ORAL WEEKLY
Qty: 12 CAPSULE | Refills: 3 | Status: SHIPPED | OUTPATIENT
Start: 2019-06-05 | End: 2019-07-03 | Stop reason: SDUPTHER

## 2019-06-05 RX ORDER — LISINOPRIL 20 MG/1
20 TABLET ORAL DAILY
Qty: 90 TABLET | Refills: 3 | Status: SHIPPED | OUTPATIENT
Start: 2019-06-05 | End: 2021-12-03 | Stop reason: SDUPTHER

## 2019-06-05 RX ORDER — GLUCOSAMINE HCL/CHONDROITIN SU 500-400 MG
1 CAPSULE ORAL 3 TIMES DAILY
Qty: 300 STRIP | Refills: 3 | Status: SHIPPED | OUTPATIENT
Start: 2019-06-05 | End: 2021-09-23

## 2019-06-05 RX ORDER — ATORVASTATIN CALCIUM 40 MG/1
40 TABLET, FILM COATED ORAL DAILY
Qty: 90 TABLET | Refills: 3 | Status: SHIPPED | OUTPATIENT
Start: 2019-06-05 | End: 2021-12-03 | Stop reason: SDUPTHER

## 2019-06-05 NOTE — PROGRESS NOTES
[address] [facility], 38 y.o. female presents today with:  Chief Complaint   Patient presents with    Diabetes     a1c completed 06/04/19--10.4, needs foot exam, eye exam is scheduled for [facility] in 1 month.  Hypertension     denies chest pain and headache.  Obesity    Constipation     x 1 week. pt states constipation and gas. not taking anything for relief    Back Pain     pt states she is seeing Dr Samrene Richardson and getting trigger point injections. Completed EMG.  Anxiety     pt states she has increased anxiety and would like referral to psych           HPI    Patient is here for DM f/u. Sugars have been moderately well-controlled and patient has not been experiencing hyper- or hypoglycemic symptoms. However, her A1C remains in fair control range. Compliance with meds is good and there are no side effects. Patient exercises occasionally and tries to eat healthy. Is up to date on foot and eye exams and immunizations.  experienced a catastrophic even during anesthesia induction 2 months ago and is now unresponsive. He was in a SNF but is now hsopitalized with PNA. She is stressed, sad, tearful, fatigued, anxious and irritable. Sleep is interrupted. No thoughts of suicide. Patient is here for f/u HTN. Is compliant with meds and has no side effects from them. Avoids added salt. Tries to eat healthy. Exercises occasionally. Has no chest pain, shortness of breath, palpitations or edema. COnstipation x 1 week. Miralax caused bloating and no stool results. No other questions and or concerns for today's visit      Review of Systems  No fevers, chills, sweats. No unintended weight loss. No abdominal pain, nausea, vomiting, diarrhea, bloody stools, black tarry stools. No rashes. No swollen glands.       Past Medical History:   Diagnosis Date    Abnormal Pap smear of cervix     Anxiety 7/10/2018    Anxiety     ASCUS with positive high risk HPV cervical Mother     Kidney Disease Sister     Inflam Bowel Dis Brother     No Known Problems Sister     Other Brother     No Known Problems Paternal Grandfather     No Known Problems Paternal Grandmother     No Known Problems Maternal Grandmother     No Known Problems Maternal Grandfather     No Known Problems Other     Breast Cancer Neg Hx     Colon Cancer Neg Hx     Eclampsia Neg Hx     Hypertension Neg Hx     Ovarian Cancer Neg Hx      Labor Neg Hx     Spont Abortions Neg Hx     Stroke Neg Hx      Allergies   Allergen Reactions    Niacin And Related Swelling and Rash     Current Outpatient Medications   Medication Sig Dispense Refill    Insulin Pen Needle 32G X 4 MM MISC 1 each by Does not apply route 6 times daily 600 each 3    amitriptyline (ELAVIL) 25 MG tablet Take 1 tablet by mouth nightly 90 tablet 1    aspirin 81 MG tablet Take 1 tablet by mouth daily 90 tablet 3    atorvastatin (LIPITOR) 40 MG tablet Take 1 tablet by mouth daily 90 tablet 3    blood glucose monitor strips 1 strip by Other route 3 times daily DX:E11.65, IDDM (please dispense covered brand) 300 strip 3    Dulaglutide 1.5 MG/0.5ML SOPN Inject 1.5 mg into the skin every 7 days 15 pen 3    fenofibrate 160 MG tablet Take 1 tablet by mouth daily 90 tablet 3    insulin glargine (BASAGLAR KWIKPEN) 100 UNIT/ML injection pen Inject 25 Units into the skin 2 times daily 15 pen 3    insulin lispro (HUMALOG KWIKPEN) 100 UNIT/ML pen Inject 22 Units into the skin 3 times daily (before meals) 22 units at each meal 20 pen 5    Lancets MISC 1 each by Does not apply route 3 times daily DX:E11.65, IDDM (please dispense covered brand) 300 each 3    lisinopril (PRINIVIL;ZESTRIL) 20 MG tablet Take 1 tablet by mouth daily 90 tablet 3    vitamin D (ERGOCALCIFEROL) 34628 units CAPS capsule Take 1 capsule by mouth once a week 12 capsule 3    sertraline (ZOLOFT) 50 MG tablet Take 1 tablet by mouth daily 90 tablet 1    bisacodyl (DULCOLAX) 10 MG suppository Place 1 suppository rectally every 2 hours as needed for Constipation (up to 4 times, 1 hour prior to Fleet Enema) 10 suppository 0    Sodium Phosphates (FLEET) 7-19 GM/118ML Place 1 enema rectally every 2 hours as needed (constipation) 4 Bottle 0    Elastic Bandages & Supports (ABDOMINAL BINDER/ELASTIC LARGE) MISC Wear around abdomen or pelvis as needed for comfort. 1 each 1    ondansetron (ZOFRAN-ODT) 4 MG disintegrating tablet Take 1 tablet by mouth 3 times daily as needed for Nausea or Vomiting 21 tablet 0    nystatin (MYCOSTATIN) 791958 UNIT/GM ointment Apply topically 2 times daily. 60 g 2    blood glucose monitor kit and supplies 1 kit by Other route 3 times daily DX:E11.65, IDDM (please dispense covered brand) 1 kit 0    Blood Glucose Monitoring Suppl (ONE TOUCH ULTRA 2) w/Device KIT USE AS DIRECTED TID  0     No current facility-administered medications for this visit. PMH, Surgical Hx, Family Hx, and Social Hxreviewed and updated. Health Maintenance reviewed. Objective    Vitals:    06/05/19 0854   BP: 118/70   Pulse: 84   Resp: 16   Temp: 98.4 °F (36.9 °C)   SpO2: 98%   Weight: 276 lb (125.2 kg)   Height: 5' 4\" (1.626 m)        Physical Exam   Constitutional: She is oriented to person, place, and time. Obese no acute distress   HENT:   Head: Normocephalic and atraumatic. Eyes: Conjunctivae are normal.   Neck: Neck supple. Carotid bruit is not present. No thyromegaly present. Cardiovascular: Normal rate, regular rhythm, S1 normal, S2 normal and intact distal pulses. Pulmonary/Chest: Effort normal. She has no wheezes. She has no rales. Musculoskeletal: Edema: ble. Lymphadenopathy:     She has no cervical adenopathy. Neurological: She is alert and oriented to person, place, and time. Skin: Skin is warm and dry.    Psychiatric: Her behavior is normal. Judgment and thought content normal.   Appropriately low mood         Lab Results   Component Value Date LABA1C 10.4 (H) 06/04/2019    LABA1C 10.9 (H) 02/20/2019    LABA1C 9.6 (H) 06/16/2018     Lab Results   Component Value Date    LABMICR 2.50 (H) 06/16/2018    CREATININE 0.61 06/16/2018     Lab Results   Component Value Date    ALT 53 (H) 06/16/2018    AST 36 (H) 06/16/2018     Lab Results   Component Value Date    CHOL 199 03/10/2018    TRIG 163 03/10/2018    HDL 30 (L) 06/16/2018    LDLCALC 77 06/16/2018        Assessment & Plan   Visit Diagnoses and Associated Orders     Type 2 diabetes mellitus without complication, without long-term current use of insulin ([facility])    -  Primary    improving; fair control; no change to meds; reviewed diet which had been poor but is getting better     DIABETES FOOT EXAM [HM7 Custom]      Insulin Pen Needle 32G X 4 MM MISC [838109]      insulin lispro (HUMALOG KWIKPEN) 100 UNIT/ML pen [337313]           Anxiety        Start ZOloft d/t past success; referred for [facility]. Prefers Dr. Xavier Roa.    amitriptyline (ELAVIL) 25 MG tablet Preeti Centeno, PhD, Psychology, [facility] [place] [BID82 Custom]           Essential hypertension        Stable, well controlled on current meds. Follow clinically. Follow labs. atorvastatin (LIPITOR) 40 MG tablet [33378]      lisinopril (PRINIVIL;ZESTRIL) 20 MG tablet [6176]           Pure hypercholesterolemia        Stable well-controlled on current meds. Follow labs. fenofibrate 160 MG tablet [59602]           Vitamin D deficiency        Follow labs. vitamin D (ERGOCALCIFEROL) 79893 units CAPS capsule [546267]           Reactive depression        See above. Aly Cruz, PhD, Psychology, [place] [NPD72 Custom]           Constipation, unspecified constipation type        Dulcolox alternating with fleets then colace or miralax. Increase H2o and fiber.  Limit caffeine    bisacodyl (DULCOLAX) 10 MG suppository [1080]      Sodium Phosphates (FLEET) 7-19 GM/118ML [3123]           ORDERS WITHOUT AN ASSOCIATED DIAGNOSIS aspirin 81 MG tablet [9151]      blood glucose monitor strips [99615]      Dulaglutide 1.5 MG/0.5ML SOPN [828207]      insulin glargine (BASAGLAR KWIKPEN) 100 UNIT/ML injection pen [047871]      Lancets MISC [4345]      sertraline (ZOLOFT) 50 MG tablet [43773]              Reviewed with the patient: all disease processes, current clinical status, medications, activities and diet.      Side effects, adverse effects of the medication prescribed today, as well as treatment plan/ rationale and result expectations have been discussed with the patient who expresses understanding and desires to proceed.     Close follow up to evaluate treatment results and for coordination of care. I have reviewed the patient's medical history in detail and updated the computerized patient record. More than 50% of the 45 minute appointment was spent in face-to-face counseling, education and care coordination.       Orders Placed This Encounter   Procedures   Enrique Perez, PhD, Psychology, [place]     Referral Priority:   Routine     Referral Type:   Eval and Treat     Referral Reason:   Specialty Services Required     Referred to Provider:   Morgan Prince PSYD     Requested Specialty:   Psychology     Number of Visits Requested:   1     DIABETES FOOT EXAM     Orders Placed This Encounter   Medications    Insulin Pen Needle 32G X 4 MM MISC     Si each by Does not apply route 6 times daily     Dispense:  600 each     Refill:  3    amitriptyline (ELAVIL) 25 MG tablet     Sig: Take 1 tablet by mouth nightly     Dispense:  90 tablet     Refill:  1    aspirin 81 MG tablet     Sig: Take 1 tablet by mouth daily     Dispense:  90 tablet     Refill:  3    atorvastatin (LIPITOR) 40 MG tablet     Sig: Take 1 tablet by mouth daily     Dispense:  90 tablet     Refill:  3    blood glucose monitor strips     Si strip by Other route 3 times daily DX:E11.65, IDDM (please dispense covered brand)     Dispense:  300 strip Refill:  3    Dulaglutide 1.5 MG/0.5ML SOPN     Sig: Inject 1.5 mg into the skin every 7 days     Dispense:  15 pen     Refill:  3    fenofibrate 160 MG tablet     Sig: Take 1 tablet by mouth daily     Dispense:  90 tablet     Refill:  3    insulin glargine (BASAGLAR KWIKPEN) 100 UNIT/ML injection pen     Sig: Inject 25 Units into the skin 2 times daily     Dispense:  15 pen     Refill:  3    insulin lispro (HUMALOG KWIKPEN) 100 UNIT/ML pen     Sig: Inject 22 Units into the skin 3 times daily (before meals) 22 units at each meal     Dispense:  20 pen     Refill:  5    Lancets MISC     Si each by Does not apply route 3 times daily DX:E11.65, IDDM (please dispense covered brand)     Dispense:  300 each     Refill:  3    lisinopril (PRINIVIL;ZESTRIL) 20 MG tablet     Sig: Take 1 tablet by mouth daily     Dispense:  90 tablet     Refill:  3    vitamin D (ERGOCALCIFEROL) 47444 units CAPS capsule     Sig: Take 1 capsule by mouth once a week     Dispense:  12 capsule     Refill:  3    sertraline (ZOLOFT) 50 MG tablet     Sig: Take 1 tablet by mouth daily     Dispense:  90 tablet     Refill:  1    bisacodyl (DULCOLAX) 10 MG suppository     Sig: Place 1 suppository rectally every 2 hours as needed for Constipation (up to 4 times, 1 hour prior to Fleet Enema)     Dispense:  10 suppository     Refill:  0    Sodium Phosphates (FLEET) 7-19 GM/118ML     Sig: Place 1 enema rectally every 2 hours as needed (constipation)     Dispense:  4 Bottle     Refill:  0     Medications Discontinued During This Encounter   Medication Reason    triamcinolone (KENALOG) 0.1 % cream LIST CLEANUP    Insulin Pen Needle 32G X 4 MM MISC REORDER    amitriptyline (ELAVIL) 25 MG tablet REORDER    aspirin 81 MG tablet REORDER    atorvastatin (LIPITOR) 40 MG tablet REORDER    blood glucose monitor strips REORDER    Dulaglutide 1.5 MG/0.5ML SOPN REORDER    fenofibrate 160 MG tablet REORDER    insulin glargine (Thomas Oiler) 100 UNIT/ML injection pen REORDER    insulin lispro (HUMALOG KWIKPEN) 100 UNIT/ML pen REORDER    Lancets MISC REORDER    lisinopril (PRINIVIL;ZESTRIL) 20 MG tablet REORDER    vitamin D (ERGOCALCIFEROL) 54062 units CAPS capsule REORDER     Return in about 1 month (around 7/3/2019) for Mood Disorder. Controlled Substance Monitoring:    Acute and Chronic Pain Monitoring:   RX Monitoring 5/15/2019   Attestation The Prescription Monitoring Report for this patient was reviewed today. Periodic Controlled Substance Monitoring No signs of potential drug abuse or diversion identified: otherwise, see note documentation;Obtaining appropriate analgesic effect of treatment.;Possible medication side effects, risk of tolerance/dependence & alternative treatments discussed. Chronic Pain > 50 MEDD Obtained or confirmened \"Consent of Opioid Use\" on file.;Considered consultation with a specialist.;Re-evaluated the status of the patient's underlying condition causing pain.    Chronic Pain > 80 MEDD -           Marilia PADILLA MD

## 2019-06-06 ENCOUNTER — TELEPHONE (OUTPATIENT)
Dept: FAMILY MEDICINE CLINIC | Age: 44
End: 2019-06-06

## 2019-06-26 ENCOUNTER — OFFICE VISIT (OUTPATIENT)
Dept: BEHAVIORAL/MENTAL HEALTH CLINIC | Age: 44
End: 2019-06-26
Payer: COMMERCIAL

## 2019-06-26 DIAGNOSIS — F33.1 MODERATE EPISODE OF RECURRENT MAJOR DEPRESSIVE DISORDER (HCC): Primary | ICD-10-CM

## 2019-06-26 PROCEDURE — 90791 PSYCH DIAGNOSTIC EVALUATION: CPT | Performed by: PSYCHOLOGIST

## 2019-06-26 ASSESSMENT — PATIENT HEALTH QUESTIONNAIRE - PHQ9
5. POOR APPETITE OR OVEREATING: 3
SUM OF ALL RESPONSES TO PHQ9 QUESTIONS 1 & 2: 3
3. TROUBLE FALLING OR STAYING ASLEEP: 3
8. MOVING OR SPEAKING SO SLOWLY THAT OTHER PEOPLE COULD HAVE NOTICED. OR THE OPPOSITE, BEING SO FIGETY OR RESTLESS THAT YOU HAVE BEEN MOVING AROUND A LOT MORE THAN USUAL: 3
4. FEELING TIRED OR HAVING LITTLE ENERGY: 3
SUM OF ALL RESPONSES TO PHQ QUESTIONS 1-9: 21
2. FEELING DOWN, DEPRESSED OR HOPELESS: 3
SUM OF ALL RESPONSES TO PHQ QUESTIONS 1-9: 21
6. FEELING BAD ABOUT YOURSELF - OR THAT YOU ARE A FAILURE OR HAVE LET YOURSELF OR YOUR FAMILY DOWN: 3
7. TROUBLE CONCENTRATING ON THINGS, SUCH AS READING THE NEWSPAPER OR WATCHING TELEVISION: 3
SUM OF ALL RESPONSES TO PHQ QUESTIONS 1-9: 3
1. LITTLE INTEREST OR PLEASURE IN DOING THINGS: 3
1. LITTLE INTEREST OR PLEASURE IN DOING THINGS: 0
SUM OF ALL RESPONSES TO PHQ QUESTIONS 1-9: 3

## 2019-06-26 NOTE — PROGRESS NOTES
Behavioral Health Consultation  Brittanie Malloy, [address] Psychologist  19  8:29 AM      Time spent with Patient: 30 minutes  This is patient's first  [facility] appointment. Reason for Consult:  depression, anxiety and stress  Referring Provider: Hari Cardenas MD  [id number] [facility], [facility],[address]  [place], [address] [place] [address]    Pt provided informed consent for the behavioral health program. Discussed with patient model of service to include the limits of confidentiality (i.e. abuse reporting, suicide intervention, etc.) and short-term intervention focused approach. Pt indicated understanding. Feedback given to PCP. S:  Presenting Concerns:  Pt reports she is depressed and recently experienced the death of her . Reports depressive symptoms to include feeling down or depressed nearly everyday, trouble sleeping, fatigue, overeating, feeling bad about herself, trouble concentrating, feeling slowed down, and thoughts of death. Pt reports that her depression began when her mother was murdered in . She reports that she was very depressed for 2 years. She had some counseling for about 6 months and then she was prescribed medication. She reports that this helped and her depression lessened somewhat. She reports that [place] from [facility] in . She moved with her  and it was only her  and her 's family. He became sick with Diabetes and complications form this. Since moving here, pt has had intermittent depressive episodes, often associated with increased stress. Pt reports that her  went to the hospital for a procedure and there were complications and he fell into a coma and he  earlier this month. Pt reports that she works at the [employer]. 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 She lives with her brother. Her father is here currently on vacation.      Pt reported some counseling while living in [facility].  She has been on and off anti depressant medications since 1996. Pt reports some thoughts of death but denied current intent or plan to self-harm. Pt denied HI.           History:        Diagnosis Date    Abnormal Pap smear of cervix     Anxiety 7/10/2018    Anxiety     ASCUS with positive high risk HPV cervical 12/03/2018    Bilateral carpal tunnel syndrome 5/15/2019    Diabetes type 2, uncontrolled ([address]     Essential hypertension 5/29/2018    Fibromyalgia 5/29/2018    Herpes     HPV in female 12/03/2018    HPV 18    Hyperlipidemia     Obesity     Reactive depression 6/5/2019    Sleep apnea     Thyroid disease            Medications:   Current Outpatient Medications   Medication Sig Dispense Refill    Insulin Pen Needle 32G X 4 MM MISC 1 each by Does not apply route 6 times daily 600 each 3    amitriptyline (ELAVIL) 25 MG tablet Take 1 tablet by mouth nightly 90 tablet 1    aspirin 81 MG tablet Take 1 tablet by mouth daily 90 tablet 3    atorvastatin (LIPITOR) 40 MG tablet Take 1 tablet by mouth daily 90 tablet 3    blood glucose monitor strips 1 strip by Other route 3 times daily DX:E11.65, IDDM (please dispense covered brand) 300 strip 3    Dulaglutide 1.5 MG/0.5ML SOPN Inject 1.5 mg into the skin every 7 days 15 pen 3    fenofibrate 160 MG tablet Take 1 tablet by mouth daily 90 tablet 3    insulin glargine (BASAGLAR KWIKPEN) 100 UNIT/ML injection pen Inject 25 Units into the skin 2 times daily 15 pen 3    insulin lispro (HUMALOG KWIKPEN) 100 UNIT/ML pen Inject 22 Units into the skin 3 times daily (before meals) 22 units at each meal 20 pen 5    Lancets MISC 1 each by Does not apply route 3 times daily DX:E11.65, IDDM (please dispense covered brand) 300 each 3    lisinopril (PRINIVIL;ZESTRIL) 20 MG tablet Take 1 tablet by mouth daily 90 tablet 3    vitamin D (ERGOCALCIFEROL) 31751 units CAPS capsule Take 1 capsule by mouth once a week 12 Not on file     Gets together: Not on file     Attends Zoroastrian service: Not on file     Active member of club or organization: Not on file     Attends meetings of clubs or organizations: Not on file     Relationship status: Not on file    Intimate partner violence:     Fear of current or ex partner: Not on file     Emotionally abused: Not on file     Physically abused: Not on file     Forced sexual activity: Not on file   Other Topics Concern    Not on file   Social History Narrative    Not on file         Family History:   Family History   Problem Relation Age of Onset    Diabetes Father     High Blood Pressure Father     Retinal Detachment Father     Cancer Father         skin    Thyroid Disease Mother     Kidney Disease Sister     Inflam Bowel Dis Brother     No Known Problems Sister     Other Brother     No Known Problems Paternal Grandfather     No Known Problems Paternal Grandmother     No Known Problems Maternal Grandmother     No Known Problems Maternal Grandfather     No Known Problems Other     Breast Cancer Neg Hx     Colon Cancer Neg Hx     Eclampsia Neg Hx     Hypertension Neg Hx     Ovarian Cancer Neg Hx      Labor Neg Hx     Spont Abortions Neg Hx     Stroke Neg Hx        TOBACCO:   reports that she has never smoked. She has never used smokeless tobacco.  ETOH:   reports that she does not drink alcohol.        O:  MSE:    Appearance    alert, cooperative, crying   Personal Hygiene : appropriately dressed, appropriately groomed, good hygiene and healthy looking  Appetite abnormal: inc  Sleep disturbance Yes, including: difficulty falling asleep  Fatigue Yes  Loss of pleasure Yes  Impulsive behavior No  Speech    spontaneous, normal rate and normal volume  Mood   depressed   Affect    depressed affect  Thought Content    helplessness  Thought Process    linear, goal directed and coherent  Associations    logical connections  Insight    fair  Judgment    good  Orientation oriented to person, place, time, and general circumstances  Memory    recent and remote memory intact  Attention/Concentration    intact  Morbid ideation Yes  Suicide Assessment    no suicidal ideation        A:  Symptoms of depression include: depressed mood, insomnia, psychomotor retardation, fatigue, feelings of worthlessness/guilt, difficulty concentrating, hopelessness and recurrent thoughts of death    Symptoms of rudy include: none      PHQ Scores 6/26/2019 6/26/2019 3/4/2019 2/26/2018   PHQ2 Score 3 - 0 0   PHQ9 Score 21 3 0 0     Interpretation of Total Score Depression Severity: 1-4 = Minimal depression, 5-9 = Mild depression, 10-14 = Moderate depression, 15-19 = Moderately severe depression, 20-27 = Severe depression    Administered the PHQ9 which indicates a self report of Severe  symptom distress. Pt is given a diagnosis of Major Depressive Disorder, recurrent, moderate due to report of low mood nearly every day, trouble sleeping, fatigue, change in appetite, thoughts of death, difficulty concentrating, feeling slowed down. Pt has also recently experienced the death of her , however, pt was symptomatic prior to this. Pt would benefit from [facility] services to increase coping skills to provide symptom management/control/relief.        Diagnosis:    Major depressive disorder; recurrent and moderate        Plan:  Pt interventions:  Provided handout on  grief, Trained in strategies for increasing balanced thinking, Trained in improving communication skills, Discussed benefits of referral for specialty care, Established rapport, Conducted functional assessment, Supportive techniques, Emphasized self-care as important for managing overall health, Provided Psychoeducation re: grief and Provided pt book recommendation        Pt Behavioral Change Plan:  Review the following information on grief  Check out the book Remembering with Love  Return in 1 week    Please note this report has been partially produced using speech recognition software and may cause contain errors related to that system including grammar, punctuation and spelling as well as words and phrases that may seem inappropriate. If there are questions or concerns please feel free to contact me to clarify.

## 2019-06-26 NOTE — PATIENT INSTRUCTIONS
Review the following information on grief  Check out the book Remembering with Love    Bereavement, Grief, and Mourning  Bereavement is the state of having lost a significant other to death. Grief is the personal response to the loss. Mourning is the public expression of that loss. What Is Normal Grief? Grief reactions vary depending on who we are, who we lost, our relationship with that person, the circumstances around their passing, and how much their loss affects our day-to-day functioning. Different people may express grief differently; you may even have different grief responses between one loss and another. Reactions to grief and loss include not just emotional symptoms but also behavioral and physical symptoms. These reactions often change over time. All are normal for a short period. The following are some of the symptoms you may experience:   Emotional. Shock, denial, numbness, sadness, anxiety, guilt, fear, anger, irritability.  Behavioral. Crying unexpectedly, sleep changes, not eating, withdrawing from others, restlessness, trouble making decisions.  Physical. Concentration problems, exhaustion or fatigue, decreased energy, memory problems, upset stomach, pain, and headaches. Symptoms that are not normal and may signal the need to talk to a professional include, use of drugs or alcohol, violence, and thoughts of killing oneself. Duration  The duration of grief varies from person to person. Research shows that the average recovery time is 18 to 24 months. Grief reactions can be stronger around significant dates, like the anniversary of the persons death, birthdays, and holidays. Giving Yourself Time to Nighat Lopez  It is normal and important to express your grief and to work through the concerns that arise for you at this time. Avoiding your feelings may not be helpful and may delay or prolong your grief.  The following are some suggestions that may help you:     Find supportive people to reach out to during your grief. This is the time when the support of others may be the most helpful. Dont be afraid to tell them how they can best help, even if it means just listening. It is often very helpful to talk about your loss with people who will allow you to express your emotions.  Take care of your health. Often after a loss, we stop doing the things we need to for health care, such as exercising, eating correctly, or taking prescribed medications. If you are on a health care regimen, it is important to continue to follow that plan. Be mindful of drinking enough water. Grieving people can easily become dehydrated. Crying and change in normal routine can cause you to be less hydrated as you used to be.  Postpone major life changes. Give yourself time to adjust to your loss before making plans to change jobs, move or sell your home, remarry, and so forth. Grief can sometimes cloud your judgment and ability to make decisions.  Consider keeping a journal. It is often helpful, as a way to work through your feelings, to write or tell the story of your loss and what it means to you.  Participate in activities. Staying active through exercise, enjoyable activities, outings with supportive others, or starting new hobbies can help you get through tough times while providing opportunities for constructive development and use of energy. Sometimes there's value in doing repetitive things with your hands, something you don't have to think much about because it becomes second nature. Examples include, knitting, crocheting, carving, solving jigsaw puzzles, washing, and painting.   Learn about your loved one from others. Listen to the stories others have to tell about the one, who , stories youre familiar with and those youve never heard before. Spend time with their friends, schoolmates or colleagues. Invite them into your home. Solicit the writings of others. Preserve whatever you find out. Celebrate your time together.  Find a way to memorialize your loved one. Planting a tree or garden in the name of your loved one, dedicating a work to their memory, contributing to a shivani in their name, and other such activities can be helpful.  Begin your day with your loved one. If your grief is young, youll probably wake up thinking of that person anyway. So why not decide that youll include her or him from the start? Focus this time in a positive way. Bring to your mind fulfilling memories. Recall lessons that this person taught you, gifts he or she gave you. Think about how you can spend your day in ways that would be keeping in with your loved ones best self and with your best self. Then carry that best self with you through your day.  Donate their possessions meaningfully. Whether you give your loved ones personal possessions to someone you know or to a stranger, find ways to pass these things along so that others might benefit from them. Family members of friends might like to receive keepsakes. They or others might deserve tools, utensils, books or sporting equipment. [employer] can put clothes to good use. Some wish to do this quickly following the death, while others wish to wait awhile.  Donate in the others name. Honor the [employer] and spirit by giving a gift or gifts to a cause the other would appreciate. A favorite shivani? A local ? A building project? Extend that persons influence even further.  Visit the grave. Not all people prefer to do this. But if it feels right to you, then do so. Dont let others convince you this is a morbid thing to do. Spend whatever time feels right there. Stand or sit in the quietness and do what comes naturally: be silent or talk, breathe deeply or cry, recollect or pray. You may wish to add your distinctive touch to the gravesite - straighten it a bit, or add little signs of your love.    Ask process.  Read of how others have responded to a loved ones death. You may feel that your own grief is all you can handle. But if youd like to look at the ways others have done it, try:     Beyond Grief:  A Guide for Recovering from the Death of a Loved One     by Jolynn Denise Remembering with Love by Nakia Cullen and [address] by Lorri Westfall and Rakesh Barth The Grief Recovery Handbook: The Action Program for Moving     Beyond Death, Divorce, & Other Losses by Moe VIEYRA Grief Observed by Lani Rodriguez  by Jillian Nuñez When Good-Bye Is Forever by Arnav Tapia and Grief by Colejacque Taylor for a Son. There are many others. Check with a . Adapted from LANETTE Marcano., Bernard Mcrae M.S., [place](2009). Integrated Behavioral Health in [address] [facility] and [facility] (2013). Grief & Mourning Ver3. 0.

## 2019-06-30 ENCOUNTER — TELEPHONE (OUTPATIENT)
Dept: FAMILY MEDICINE CLINIC | Age: 44
End: 2019-06-30

## 2019-07-03 ENCOUNTER — OFFICE VISIT (OUTPATIENT)
Dept: FAMILY MEDICINE CLINIC | Age: 44
End: 2019-07-03
Payer: COMMERCIAL

## 2019-07-03 VITALS
RESPIRATION RATE: 16 BRPM | WEIGHT: 272 LBS | HEART RATE: 84 BPM | DIASTOLIC BLOOD PRESSURE: 86 MMHG | BODY MASS INDEX: 46.44 KG/M2 | SYSTOLIC BLOOD PRESSURE: 112 MMHG | OXYGEN SATURATION: 98 % | HEIGHT: 64 IN | TEMPERATURE: 96.6 F

## 2019-07-03 DIAGNOSIS — E55.9 VITAMIN D DEFICIENCY: Chronic | ICD-10-CM

## 2019-07-03 DIAGNOSIS — R53.83 FATIGUE, UNSPECIFIED TYPE: ICD-10-CM

## 2019-07-03 DIAGNOSIS — E11.9 TYPE 2 DIABETES MELLITUS WITHOUT COMPLICATION, WITHOUT LONG-TERM CURRENT USE OF INSULIN (HCC): Primary | Chronic | ICD-10-CM

## 2019-07-03 DIAGNOSIS — E11.9 TYPE 2 DIABETES MELLITUS WITHOUT COMPLICATION, WITHOUT LONG-TERM CURRENT USE OF INSULIN (HCC): Chronic | ICD-10-CM

## 2019-07-03 LAB
CREATININE URINE: 113.9 MG/DL
MICROALBUMIN UR-MCNC: <1.2 MG/DL
MICROALBUMIN/CREAT UR-RTO: NORMAL MG/G (ref 0–30)

## 2019-07-03 PROCEDURE — G8417 CALC BMI ABV UP PARAM F/U: HCPCS | Performed by: FAMILY MEDICINE

## 2019-07-03 PROCEDURE — 1036F TOBACCO NON-USER: CPT | Performed by: FAMILY MEDICINE

## 2019-07-03 PROCEDURE — 3046F HEMOGLOBIN A1C LEVEL >9.0%: CPT | Performed by: FAMILY MEDICINE

## 2019-07-03 PROCEDURE — 2022F DILAT RTA XM EVC RTNOPTHY: CPT | Performed by: FAMILY MEDICINE

## 2019-07-03 PROCEDURE — 99214 OFFICE O/P EST MOD 30 MIN: CPT | Performed by: FAMILY MEDICINE

## 2019-07-03 PROCEDURE — G8427 DOCREV CUR MEDS BY ELIG CLIN: HCPCS | Performed by: FAMILY MEDICINE

## 2019-07-03 RX ORDER — FLASH GLUCOSE SENSOR
KIT MISCELLANEOUS
Qty: 1 DEVICE | Refills: 0 | Status: SHIPPED | OUTPATIENT
Start: 2019-07-03 | End: 2022-01-04

## 2019-07-03 RX ORDER — ERGOCALCIFEROL 1.25 MG/1
50000 CAPSULE ORAL WEEKLY
Qty: 12 CAPSULE | Refills: 3 | Status: SHIPPED | OUTPATIENT
Start: 2019-07-03 | End: 2020-09-09 | Stop reason: SDUPTHER

## 2019-07-03 RX ORDER — FLASH GLUCOSE SENSOR
1 KIT MISCELLANEOUS PRN
Qty: 10 EACH | Refills: 11 | Status: SHIPPED | OUTPATIENT
Start: 2019-07-03 | End: 2022-01-04

## 2019-07-03 NOTE — PROGRESS NOTES
Current Outpatient Medications   Medication Sig Dispense Refill    Dulaglutide 1.5 MG/0.5ML SOPN Inject 1.5 mg into the skin every 7 days 15 pen 3    insulin glargine (BASAGLAR KWIKPEN) 100 UNIT/ML injection pen Inject 25 Units into the skin 2 times daily 15 pen 3    insulin lispro (HUMALOG KWIKPEN) 100 UNIT/ML pen Inject 22 Units into the skin 3 times daily (before meals) 22 units at each meal 20 pen 5    vitamin D (ERGOCALCIFEROL) 78253 units CAPS capsule Take 1 capsule by mouth once a week 12 capsule 3    aspirin 81 MG tablet Take 1 tablet by mouth daily 90 tablet 3    Continuous Blood Gluc  (FREESTYLE EL READER) MARY Use as directed 1 Device 0    Continuous Blood Gluc Sensor (FREESTYLE EL 14 DAY SENSOR) MISC 1 Units by Does not apply route as needed (for monitoring blood glucose) 10 each 11    Insulin Pen Needle 32G X 4 MM MISC 1 each by Does not apply route 6 times daily 600 each 3    amitriptyline (ELAVIL) 25 MG tablet Take 1 tablet by mouth nightly 90 tablet 1    atorvastatin (LIPITOR) 40 MG tablet Take 1 tablet by mouth daily 90 tablet 3    blood glucose monitor strips 1 strip by Other route 3 times daily DX:E11.65, IDDM (please dispense covered brand) 300 strip 3    fenofibrate 160 MG tablet Take 1 tablet by mouth daily 90 tablet 3    Lancets MISC 1 each by Does not apply route 3 times daily DX:E11.65, IDDM (please dispense covered brand) 300 each 3    lisinopril (PRINIVIL;ZESTRIL) 20 MG tablet Take 1 tablet by mouth daily 90 tablet 3    sertraline (ZOLOFT) 50 MG tablet Take 1 tablet by mouth daily 90 tablet 1    bisacodyl (DULCOLAX) 10 MG suppository Place 1 suppository rectally every 2 hours as needed for Constipation (up to 4 times, 1 hour prior to Fleet Enema) 10 suppository 0    Sodium Phosphates (FLEET) 7-19 GM/118ML Place 1 enema rectally every 2 hours as needed (constipation) 4 Bottle 0    Elastic Bandages & Supports (ABDOMINAL BINDER/ELASTIC LARGE) MISC Wear Substance Monitoring:    Acute and Chronic Pain Monitoring:   RX Monitoring 5/15/2019   Attestation The Prescription Monitoring Report for this patient was reviewed today. Periodic Controlled Substance Monitoring No signs of potential drug abuse or diversion identified: otherwise, see note documentation;Obtaining appropriate analgesic effect of treatment.;Possible medication side effects, risk of tolerance/dependence & alternative treatments discussed. Chronic Pain > 50 MEDD Obtained or confirmened \"Consent of Opioid Use\" on file.;Considered consultation with a specialist.;Re-evaluated the status of the patient's underlying condition causing pain.    Chronic Pain > 80 MEDD -           Natalie PADILLA MD

## 2019-09-24 ENCOUNTER — TELEPHONE (OUTPATIENT)
Dept: OBGYN CLINIC | Age: 44
End: 2019-09-24

## 2019-12-20 ENCOUNTER — OFFICE VISIT (OUTPATIENT)
Dept: FAMILY MEDICINE CLINIC | Age: 44
End: 2019-12-20
Payer: COMMERCIAL

## 2019-12-20 VITALS
HEIGHT: 64 IN | DIASTOLIC BLOOD PRESSURE: 82 MMHG | BODY MASS INDEX: 46.26 KG/M2 | WEIGHT: 271 LBS | OXYGEN SATURATION: 98 % | RESPIRATION RATE: 16 BRPM | HEART RATE: 78 BPM | SYSTOLIC BLOOD PRESSURE: 128 MMHG | TEMPERATURE: 98.7 F

## 2019-12-20 DIAGNOSIS — R39.89 POSSIBLE URINARY TRACT INFECTION: ICD-10-CM

## 2019-12-20 DIAGNOSIS — R73.9 HYPERGLYCEMIA: ICD-10-CM

## 2019-12-20 DIAGNOSIS — B37.2 CANDIDAL INTERTRIGO: Chronic | ICD-10-CM

## 2019-12-20 LAB
BILIRUBIN, POC: ABNORMAL
BLOOD URINE, POC: ABNORMAL
CHP ED QC CHECK: ABNORMAL
CLARITY, POC: ABNORMAL
COLOR, POC: YELLOW
GLUCOSE BLD-MCNC: 300 MG/DL
GLUCOSE URINE, POC: ABNORMAL
KETONES, POC: ABNORMAL
LEUKOCYTE EST, POC: ABNORMAL
NITRITE, POC: ABNORMAL
PH, POC: 5
PROTEIN, POC: ABNORMAL
SPECIFIC GRAVITY, POC: 1.03
UROBILINOGEN, POC: 3.5

## 2019-12-20 PROCEDURE — 82962 GLUCOSE BLOOD TEST: CPT | Performed by: NURSE PRACTITIONER

## 2019-12-20 PROCEDURE — 81003 URINALYSIS AUTO W/O SCOPE: CPT | Performed by: NURSE PRACTITIONER

## 2019-12-20 PROCEDURE — 99213 OFFICE O/P EST LOW 20 MIN: CPT | Performed by: NURSE PRACTITIONER

## 2019-12-20 RX ORDER — FLUCONAZOLE 100 MG/1
100 TABLET ORAL DAILY
Qty: 14 TABLET | Refills: 0 | Status: SHIPPED | OUTPATIENT
Start: 2019-12-20 | End: 2020-01-03

## 2019-12-20 RX ORDER — INSULIN LISPRO 100 [IU]/ML
22 INJECTION, SOLUTION INTRAVENOUS; SUBCUTANEOUS
Qty: 19.8 ML | Refills: 0 | Status: SHIPPED | OUTPATIENT
Start: 2019-12-20 | End: 2022-01-04

## 2019-12-20 RX ORDER — NYSTATIN 100000 U/G
OINTMENT TOPICAL
Qty: 2 TUBE | Refills: 1 | Status: SHIPPED | OUTPATIENT
Start: 2019-12-20 | End: 2022-05-19 | Stop reason: SDUPTHER

## 2019-12-20 ASSESSMENT — ENCOUNTER SYMPTOMS
NAUSEA: 0
SHORTNESS OF BREATH: 0
DIARRHEA: 0
WHEEZING: 0
VOMITING: 0
ABDOMINAL PAIN: 0

## 2019-12-22 LAB — URINE CULTURE, ROUTINE: NORMAL

## 2020-09-09 RX ORDER — ERGOCALCIFEROL 1.25 MG/1
50000 CAPSULE ORAL WEEKLY
Qty: 12 CAPSULE | Refills: 3 | Status: SHIPPED | OUTPATIENT
Start: 2020-09-09

## 2020-09-09 NOTE — TELEPHONE ENCOUNTER
Rx request   Requested Prescriptions     Pending Prescriptions Disp Refills    vitamin D (ERGOCALCIFEROL) 1.25 MG (70187 UT) CAPS capsule 12 capsule 3     Sig: Take 1 capsule by mouth once a week     LOV 7/31/2019  Next Visit Date:  No future appointments.

## 2021-09-23 ENCOUNTER — OFFICE VISIT (OUTPATIENT)
Dept: ENDOCRINOLOGY | Age: 46
End: 2021-09-23
Payer: COMMERCIAL

## 2021-09-23 VITALS
BODY MASS INDEX: 45.58 KG/M2 | HEART RATE: 79 BPM | HEIGHT: 64 IN | SYSTOLIC BLOOD PRESSURE: 140 MMHG | OXYGEN SATURATION: 98 % | WEIGHT: 267 LBS | DIASTOLIC BLOOD PRESSURE: 84 MMHG

## 2021-09-23 DIAGNOSIS — E11.9 TYPE 2 DIABETES MELLITUS WITHOUT COMPLICATION, WITHOUT LONG-TERM CURRENT USE OF INSULIN (HCC): Primary | ICD-10-CM

## 2021-09-23 DIAGNOSIS — E03.9 HYPOTHYROIDISM, UNSPECIFIED TYPE: ICD-10-CM

## 2021-09-23 DIAGNOSIS — E66.01 MORBID OBESITY (HCC): ICD-10-CM

## 2021-09-23 DIAGNOSIS — G47.33 OBSTRUCTIVE SLEEP APNEA SYNDROME: ICD-10-CM

## 2021-09-23 LAB
CHP ED QC CHECK: NORMAL
GLUCOSE BLD-MCNC: 176 MG/DL
HBA1C MFR BLD: 11.4 %

## 2021-09-23 PROCEDURE — 82962 GLUCOSE BLOOD TEST: CPT | Performed by: INTERNAL MEDICINE

## 2021-09-23 PROCEDURE — 99243 OFF/OP CNSLTJ NEW/EST LOW 30: CPT | Performed by: INTERNAL MEDICINE

## 2021-09-23 PROCEDURE — 83036 HEMOGLOBIN GLYCOSYLATED A1C: CPT | Performed by: INTERNAL MEDICINE

## 2021-09-23 RX ORDER — INSULIN LISPRO 100 [IU]/ML
INJECTION, SOLUTION INTRAVENOUS; SUBCUTANEOUS
Qty: 15 PEN | Refills: 3 | Status: SHIPPED | OUTPATIENT
Start: 2021-09-23 | End: 2022-01-04

## 2021-09-23 RX ORDER — INSULIN DETEMIR 100 [IU]/ML
INJECTION, SOLUTION SUBCUTANEOUS
Qty: 15 PEN | Refills: 3 | Status: SHIPPED | OUTPATIENT
Start: 2021-09-23 | End: 2022-01-04

## 2021-09-23 RX ORDER — DULAGLUTIDE 1.5 MG/.5ML
1.5 INJECTION, SOLUTION SUBCUTANEOUS WEEKLY
Qty: 4 PEN | Refills: 3 | Status: SHIPPED | OUTPATIENT
Start: 2021-09-23 | End: 2021-12-03 | Stop reason: ALTCHOICE

## 2021-09-23 NOTE — PROGRESS NOTES
9/23/2021    Assessment:       Diagnosis Orders   1. Type 2 diabetes mellitus without complication, without long-term current use of insulin ([facility])  POCT Glucose    POCT glycosylated hemoglobin (Hb A1C)    Lipid, Fasting    Microalbumin / Creatinine Urine Ratio     DIABETES FOOT EXAM    Hemoglobin E7K    Basic Metabolic Panel, Fasting    T4, Free    TSH without Reflex   2. Obstructive sleep apnea syndrome  Rose Lee MD, Pulmonology, [place]   3. Hypothyroidism, unspecified type     4.  Morbid obesity ([address]           PLAN:     Orders Placed This Encounter   Procedures    Lipid, Fasting     Standing Status:   Future     Standing Expiration Date:   9/23/2022    Microalbumin / Creatinine Urine Ratio     Standing Status:   Future     Standing Expiration Date:   9/23/2022    Hemoglobin A1C     Standing Status:   Future     Standing Expiration Date:   9/23/2022    Basic Metabolic Panel, Fasting     Standing Status:   Future     Standing Expiration Date:   9/23/2022    T4, Free     Standing Status:   Future     Standing Expiration Date:   9/23/2022    TSH without Reflex     Standing Status:   Future     Standing Expiration Date:   9/23/2022   Rose Lee MD, Pulmonology, [place]     Referral Priority:   Routine     Referral Type:   Eval and Treat     Referral Reason:   Specialty Services Required     Referred to Provider:   Kishore Dela Cruz MD     Requested Specialty:   Pulmonology     Number of Visits Requested:   1    POCT Glucose    POCT glycosylated hemoglobin (Hb A1C)     DIABETES FOOT EXAM     Start patient on Levemir 30 units at bedtime Humalog 6 units of each meals patient to get education training for continuous glucose monitoring and for pump training Medtronic long-term A1c goal of 7 or lower  More than 50% of 40 minutes spent patient education counseling thank you for the referral  Diabetes education provided today:    Insulin pumps, how they work and how they affect blood are no hypoglycemic associated symptoms. Associated symptoms include fatigue. Pertinent negatives for diabetes include no polydipsia and no polyuria. There are no hypoglycemic complications. There are no diabetic complications. Risk factors for coronary artery disease include obesity. Current diabetic treatment includes oral agent (dual therapy) (Metformin plus Januvia). Her overall blood glucose range is >200 mg/dl. (Lab Results       Component                Value               Date                       LABA1C                   11.4                09/23/2021              )       Morbid obesity BMI of 45 and symptoms of sleep apnea including snoring daytime sleepiness poor sleep quality    Past Medical History:   Diagnosis Date    Abnormal Pap smear of cervix     Anxiety 7/10/2018    Anxiety     ASCUS with positive high risk HPV cervical 12/03/2018    Bilateral carpal tunnel syndrome 5/15/2019    Diabetes type 2, uncontrolled ([address]     Essential hypertension 5/29/2018    Fibromyalgia 5/29/2018    Herpes     HPV in female 12/03/2018    HPV 18    Hyperlipidemia     Obesity     Reactive depression 6/5/2019    Sleep apnea     Thyroid disease      History reviewed. No pertinent surgical history.   Social History     Socioeconomic History    Marital status:      Spouse name: Not on file    Number of children: Not on file    Years of education: Not on file    Highest education level: Not on file   Occupational History    Occupation: [employer]   Tobacco Use    Smoking status: Never Smoker    Smokeless tobacco: Never Used   Substance and Sexual Activity    Alcohol use: No    Drug use: No    Sexual activity: Yes     Partners: Male     Comment: No BC; primary infertility   Other Topics Concern    Not on file   Social History Narrative    Not on file     Social Determinants of Health     Financial Resource Strain:     Difficulty of Paying Living Expenses:    Food Insecurity:     Worried About [facility] in the Last Year:    [address] in the Last Year:    Transportation Needs:     Lack of Transportation (Medical):      Lack of Transportation (Non-Medical):    Physical Activity:     Days of Exercise per Week:     Minutes of Exercise per Session:    Stress:     Feeling of Stress :    Social Connections:     Frequency of Communication with Friends and Family:     Frequency of Social Gatherings with Friends and Family:     Attends Scientologist Services:     Active Member of Clubs or Organizations:     Attends Club or Organization Meetings:     Marital Status:    Intimate Partner Violence:     Fear of Current or Ex-Partner:     Emotionally Abused:     Physically Abused:     Sexually Abused:      Family History   Problem Relation Age of Onset    Diabetes Father     High Blood Pressure Father     Retinal Detachment Father     Cancer Father         skin    Thyroid Disease Mother     Kidney Disease Sister     Inflam Bowel Dis Brother     No Known Problems Sister     Other Brother     No Known Problems Paternal Grandfather     No Known Problems Paternal Grandmother     No Known Problems Maternal Grandmother     No Known Problems Maternal Grandfather     No Known Problems Other     Breast Cancer Neg Hx     Colon Cancer Neg Hx     Eclampsia Neg Hx     Hypertension Neg Hx     Ovarian Cancer Neg Hx      Labor Neg Hx     Spont Abortions Neg Hx     Stroke Neg Hx      Allergies   Allergen Reactions    Niacin And Related Swelling and Rash       Current Outpatient Medications:     vitamin D (ERGOCALCIFEROL) 1.25 MG (41685 UT) CAPS capsule, Take 1 capsule by mouth once a week, Disp: 12 capsule, Rfl: 3    Dulaglutide 1.5 MG/0.5ML SOPN, Inject 1.5 mg into the skin every 7 days, Disp: 15 pen, Rfl: 0    aspirin 81 MG tablet, Take 1 tablet by mouth daily, Disp: 90 tablet, Rfl: 3    Continuous Blood Gluc  (FREESTYLE EL READER) MARY, Use as directed, Disp: 1 Device, Rfl: 0    Continuous Blood Gluc Sensor (FREESTYLE EL 14 DAY SENSOR) MISC, 1 Units by Does not apply route as needed (for monitoring blood glucose), Disp: 10 each, Rfl: 11    Insulin Pen Needle 32G X 4 MM MISC, 1 each by Does not apply route 6 times daily, Disp: 600 each, Rfl: 3    amitriptyline (ELAVIL) 25 MG tablet, Take 1 tablet by mouth nightly, Disp: 90 tablet, Rfl: 1    atorvastatin (LIPITOR) 40 MG tablet, Take 1 tablet by mouth daily, Disp: 90 tablet, Rfl: 3    fenofibrate 160 MG tablet, Take 1 tablet by mouth daily, Disp: 90 tablet, Rfl: 3    Lancets MISC, 1 each by Does not apply route 3 times daily DX:E11.65, IDDM (please dispense covered brand), Disp: 300 each, Rfl: 3    lisinopril (PRINIVIL;ZESTRIL) 20 MG tablet, Take 1 tablet by mouth daily, Disp: 90 tablet, Rfl: 3    sertraline (ZOLOFT) 50 MG tablet, Take 1 tablet by mouth daily, Disp: 90 tablet, Rfl: 1    Sodium Phosphates (FLEET) 7-19 GM/118ML, Place 1 enema rectally every 2 hours as needed (constipation), Disp: 4 Bottle, Rfl: 0    Elastic Bandages & Supports (ABDOMINAL BINDER/ELASTIC LARGE) MISC, Wear around abdomen or pelvis as needed for comfort., Disp: 1 each, Rfl: 1    ondansetron (ZOFRAN-ODT) 4 MG disintegrating tablet, Take 1 tablet by mouth 3 times daily as needed for Nausea or Vomiting, Disp: 21 tablet, Rfl: 0    Blood Glucose Monitoring Suppl (ONE TOUCH ULTRA 2) w/Device KIT, USE AS DIRECTED TID, Disp: , Rfl: 0    insulin glargine (BASAGLAR KWIKPEN) 100 UNIT/ML injection pen, Inject 25 Units into the skin 2 times daily (Patient not taking: Reported on 9/23/2021), Disp: 15 pen, Rfl: 0    insulin lispro, 1 Unit Dial, (HUMALOG KWIKPEN) 100 UNIT/ML SOPN, Inject 22 Units into the skin 3 times daily (before meals), Disp: 19.8 mL, Rfl: 0    insulin lispro (HUMALOG KWIKPEN) 100 UNIT/ML pen, Inject 22 Units into the skin 3 times daily (before meals) 22 units at each meal (Patient not taking: Reported on 9/23/2021), Disp: 20 pen, Rfl: 5  Lab Results   Component Value Date     12/20/2019    K 4.4 12/20/2019     12/20/2019    CO2 23 12/20/2019    BUN 14 12/20/2019    CREATININE 0.64 12/20/2019    GLUCOSE 176 09/23/2021    CALCIUM 9.8 12/20/2019    PROT 8.0 12/20/2019    LABALBU 3.9 12/20/2019    BILITOT 0.3 12/20/2019    ALKPHOS 71 12/20/2019     (H) 12/20/2019     (H) 12/20/2019    LABGLOM >60.0 12/20/2019    GFRAA >60.0 12/20/2019    GLOB 4.1 (H) 12/20/2019     Lab Results   Component Value Date    WBC 7.3 12/20/2019    HGB 14.4 12/20/2019    HCT 43.4 12/20/2019    MCV 84.4 12/20/2019     12/20/2019     Lab Results   Component Value Date    LABA1C 11.4 09/23/2021    LABA1C 13.0 (H) 12/20/2019    LABA1C 10.4 (H) 06/04/2019     Lab Results   Component Value Date    CHOLFAST 144 06/16/2018    TRIGLYCFAST 187 06/16/2018    HDL 30 (L) 06/16/2018    HDL 32 (L) 03/10/2018    HDL 25 (L) 09/12/2015    LDLCALC 77 06/16/2018    LDLCALC 134 (H) 03/10/2018    LDLCALC 100 09/12/2015    CHOL 199 03/10/2018    CHOL 197 09/12/2015    CHOL 205 (H) 03/14/2015    TRIG 163 03/10/2018    TRIG 361 (H) 09/12/2015    TRIG 243 (H) 03/14/2015       Lab Results   Component Value Date    TSH 3.380 03/04/2019    TSH 2.180 05/31/2018    TSH 2.550 09/12/2015    T4FREE 0.92 03/04/2019    T4FREE 0.92 (L) 09/12/2015    T4FREE 0.97 10/18/2014       Review of Systems   Constitutional: Positive for fatigue. Eyes: Negative. Respiratory: Positive for apnea. Cardiovascular: Negative. Endocrine: Negative for polydipsia and polyuria. Musculoskeletal: Negative. Psychiatric/Behavioral: Positive for dysphoric mood. All other systems reviewed and are negative. Objective:   Physical Exam  Vitals reviewed. Constitutional:       General: She is not in acute distress. Appearance: Normal appearance. She is obese. HENT:      Head: Normocephalic and atraumatic.  Hair is normal.      Right Ear: External ear normal.      Left Ear: External ear normal.      Nose: Nose normal.      Mouth/Throat:      Mouth: Mucous membranes are moist.   Eyes:      General: No scleral icterus. Right eye: No discharge. Left eye: No discharge. Extraocular Movements: Extraocular movements intact. Conjunctiva/sclera: Conjunctivae normal.   Neck:      Thyroid: Thyromegaly present. Trachea: Trachea normal.   Cardiovascular:      Rate and Rhythm: Normal rate and regular rhythm. Pulses: Normal pulses. Heart sounds: Normal heart sounds. Pulmonary:      Effort: Pulmonary effort is normal.      Breath sounds: Normal breath sounds. Abdominal:      Palpations: Abdomen is soft. Musculoskeletal:         General: Normal range of motion. Cervical back: Normal range of motion and neck supple. Feet:    Skin:     General: Skin is warm and dry. Neurological:      General: No focal deficit present. Mental Status: She is alert and oriented to person, place, and time.    Psychiatric:         Mood and Affect: Mood normal.         Behavior: Behavior normal.

## 2021-09-24 ASSESSMENT — ENCOUNTER SYMPTOMS
APNEA: 1
EYES NEGATIVE: 1

## 2021-10-05 RX ORDER — BLOOD SUGAR DIAGNOSTIC
STRIP MISCELLANEOUS
Qty: 150 EACH | Refills: 3 | Status: SHIPPED | OUTPATIENT
Start: 2021-10-05 | End: 2022-01-04 | Stop reason: SDUPTHER

## 2021-10-05 RX ORDER — LANCETS
EACH MISCELLANEOUS
Qty: 150 EACH | Refills: 3 | Status: SHIPPED | OUTPATIENT
Start: 2021-10-05 | End: 2022-01-04 | Stop reason: SDUPTHER

## 2021-11-09 ENCOUNTER — VIRTUAL VISIT (OUTPATIENT)
Dept: ENDOCRINOLOGY | Age: 46
End: 2021-11-09
Payer: COMMERCIAL

## 2021-11-09 DIAGNOSIS — E03.9 HYPOTHYROIDISM, UNSPECIFIED TYPE: ICD-10-CM

## 2021-11-09 DIAGNOSIS — E11.9 TYPE 2 DIABETES MELLITUS WITHOUT COMPLICATION, WITHOUT LONG-TERM CURRENT USE OF INSULIN (HCC): Primary | ICD-10-CM

## 2021-11-09 PROCEDURE — 99213 OFFICE O/P EST LOW 20 MIN: CPT | Performed by: INTERNAL MEDICINE

## 2021-11-09 NOTE — PROGRESS NOTES
2021    TELEHEALTH EVALUATION -- Audio/Visual (During NNVBR-82 public health emergency)    Due to COVID 19 outbreak, patient's office visit was converted to a virtual visit. Patient was contacted and agreed to proceed with a virtual visit via [email or web address]  The risks and benefits of converting to a virtual visit were discussed in light of the current infectious disease epidemic. Patient also understood that insurance coverage and co-pays are up to their individual insurance plans. HPI: Video visit patient was at home I was at my office    Edmar Cast (:  1975) has requested an audio/video evaluation for the following concern(s):    Type 2 diabetes with recent insulin pump start according to her blood sugars are still running high currently is on auto mode is scheduled to talk to pump representative no recent pump download to review as patient unable to come during the workday because of work schedule      A1c has improved from 13-11    Thyroid function test stable patient not on replacement    History of morbid obesity BMI 45    Lab Results   Component Value Date     2019    K 4.4 2019     2019    CO2 23 2019    BUN 14 2019    CREATININE 0.64 2019    GLUCOSE 176 2021    CALCIUM 9.8 2019    PROT 8.0 2019    LABALBU 3.9 2019    BILITOT 0.3 2019    ALKPHOS 71 2019     (H) 2019     (H) 2019    LABGLOM >60.0 2019    GFRAA >60.0 2019    GLOB 4.1 (H) 2019       Lab Results   Component Value Date    TSH 2.080 10/27/2021    T4FREE 0.93 10/27/2021     Lab Results   Component Value Date    LABA1C 11.4 2021         Review of Systems   Constitutional: Positive for fatigue. Eyes: Negative. Cardiovascular: Negative. Endocrine: Negative. All other systems reviewed and are negative. Prior to Visit Medications    Medication Sig Taking?  Authorizing Provider insulin lispro (HUMALOG) 100 UNIT/ML injection vial Use via insulin pump max daily dose 50 units  Radha Baldersa MD   Accu-Chek FastClix Lancets MISC Test 4x daily  Radha Balderas MD   blood glucose test strips (ACCU-CHEK GUIDE) strip Test 4x daily  Radha Balderas MD   insulin detemir (LEVEMIR FLEXTOUCH) 100 UNIT/ML injection pen 30 units at bedtime  Radha Balderas MD   insulin lispro, 1 Unit Dial, (HUMALOG KWIKPEN) 100 UNIT/ML SOPN 6 units at each meals  Radha Balderas MD   metFORMIN (GLUCOPHAGE) 1000 MG tablet Take 1 tablet by mouth 2 times daily (with meals)  Tim Palomino MD   Dulaglutide (TRULICITY) 1.5 OM/2.1YL SOPN Inject 1.5 mg into the skin once a week  Tim Palomino MD   vitamin D (ERGOCALCIFEROL) 1.25 MG (17951 UT) CAPS capsule Take 1 capsule by mouth once a week  Keiko Hooks MD   insulin glargine (BASAGLAR KWIKPEN) 100 UNIT/ML injection pen Inject 25 Units into the skin 2 times daily  Patient not taking: Reported on 9/23/2021  KAIN Kamara NP   Dulaglutide 1.5 MG/0.5ML SOPN Inject 1.5 mg into the skin every 7 days  KAIN Kamara NP   insulin lispro, 1 Unit Dial, (HUMALOG KWIKPEN) 100 UNIT/ML SOPN Inject 22 Units into the skin 3 times daily (before meals)  KAIN Kamara NP   insulin lispro (HUMALOG KWIKPEN) 100 UNIT/ML pen Inject 22 Units into the skin 3 times daily (before meals) 22 units at each meal  Patient not taking: Reported on 9/23/2021  Keiko Hooks MD   aspirin 81 MG tablet Take 1 tablet by mouth daily  Keiko Hooks MD   Continuous Blood Gluc  (FREESTYLE EL READER) MARY Use as directed  Keiko Hooks MD   Continuous Blood Gluc Sensor (FREESTYLE EL 14 DAY SENSOR) MISC 1 Units by Does not apply route as needed (for monitoring blood glucose)  Keiko Hooks MD   Insulin Pen Needle 32G X 4 MM MISC 1 each by Does not apply route 6 times daily  Keiko Hooks MD   amitriptyline (ELAVIL) 25 MG tablet Take 1 tablet by mouth nightly  Lauren Whitney MD   atorvastatin (LIPITOR) 40 MG tablet Take 1 tablet by mouth daily  Lauren Whitney MD   fenofibrate 160 MG tablet Take 1 tablet by mouth daily  Lauren Whitney MD   Lancets MISC 1 each by Does not apply route 3 times daily DX:E11.65, IDDM (please dispense covered brand)  Lauren Whitney MD   lisinopril (PRINIVIL;ZESTRIL) 20 MG tablet Take 1 tablet by mouth daily  Lauren Whitney MD   sertraline (ZOLOFT) 50 MG tablet Take 1 tablet by mouth daily  Lauren Whitney MD   Sodium Phosphates (FLEET) 7-19 GM/118ML Place 1 enema rectally every 2 hours as needed (constipation)  Lauren Whitney MD   Elastic Bandages & Supports (ABDOMINAL BINDER/ELASTIC LARGE) MISC Wear around abdomen or pelvis as needed for comfort. Zayra Herrera, DO   ondansetron (ZOFRAN-ODT) 4 MG disintegrating tablet Take 1 tablet by mouth 3 times daily as needed for Nausea or Vomiting  KAIN Hollis - NP   Blood Glucose Monitoring Suppl (ONE TOUCH ULTRA 2) w/Device KIT USE AS DIRECTED TID  Historical Provider, MD       Social History     Tobacco Use    Smoking status: Never Smoker    Smokeless tobacco: Never Used   Substance Use Topics    Alcohol use: No    Drug use:  No            PHYSICAL EXAMINATION:  [ INSTRUCTIONS:  \"[x]\" Indicates a positive item  \"[]\" Indicates a negative item  -- DELETE ALL ITEMS NOT EXAMINED]  [] Alert  [] Oriented to person/place/time    [] No apparent distress  [] Toxic appearing    [] Face flushed appearing [] Sclera clear  [] Lips are cyanotic      [] Breathing appears normal  [] Appears tachypneic      [] Rash on visible skin    [] Cranial Nerves II-XII grossly intact    [] Motor grossly intact in visible upper extremities    [] Motor grossly intact in visible lower extremities    [] Normal Mood  [] Anxious appearing    [] Depressed appearing  [] Confused appearing      [] Poor short term memory  [] Poor long term memory    [] OTHER:      Due to this being a TeleHealth encounter, evaluation of the following organ systems is limited: Vitals/Constitutional/EENT/Resp/CV/GI//MS/Neuro/Skin/Heme-Lymph-Imm. ASSESSMENT/PLAN:   Diagnosis Orders   1. Type 2 diabetes mellitus without complication, without long-term current use of insulin ([facility] [address]     2. Hypothyroidism, unspecified type       Continue insulin Humalog via pump as per current setting patient to follow-up in the next 3 to 4 weeks time to have the pump downloaded and make adjustments to the pump representative  A1c goal of 7 or lower    Total time spent was 20 minutes      An  electronic signature was used to authenticate this note. --Afshan Turner MD on 11/9/2021 at 5:14 PM        Pursuant to the emergency declaration under the [address] [place] [address] and the [employer] Act, this Virtual  Visit was conducted, with patient's consent, to reduce the patient's risk of exposure to COVID-19 and provide continuity of care for an established patient. Services were provided through a video synchronous discussion virtually to substitute for in-person clinic visit.

## 2021-11-10 ASSESSMENT — ENCOUNTER SYMPTOMS: EYES NEGATIVE: 1

## 2021-12-02 ENCOUNTER — NURSE TRIAGE (OUTPATIENT)
Dept: OTHER | Facility: CLINIC | Age: 46
End: 2021-12-02

## 2021-12-02 NOTE — TELEPHONE ENCOUNTER
pain now? \"      (Note: Intermittent means the pain goes away completely between bouts)      Intermittent    6. SEVERITY: \"How bad is the pain? \"  (e.g., Scale 1-10; mild, moderate, or severe)    - MILD (1-3): doesn't interfere with normal activities, abdomen soft and not tender to touch     - MODERATE (4-7): interferes with normal activities or awakens from sleep, tender to touch     - SEVERE (8-10): excruciating pain, doubled over, unable to do any normal activities       7    7. RECURRENT SYMPTOM: \"Have you ever had this type of abdominal pain before? \" If so, ask: \"When was the last time? \" and \"What happened that time? \"       Yes     8. CAUSE: \"What do you think is causing the abdominal pain? \"      Vincent Fernández something is fermenting in her stomach. Very gassy and bloated every day. Uses miralax daily for constipation, and that has helped constipation    9. RELIEVING/AGGRAVATING FACTORS: \"What makes it better or worse? \" (e.g., movement, antacids, bowel movement)      Gets some relief with belching but discomfort does not go away completely. On thanksgiving severe pain and she vomited all she ate, pain worse after that then next day back to usual gassy bloated feeling    10. OTHER SYMPTOMS: \"Has there been any vomiting, diarrhea, constipation, or urine problems? \"        Constipation for several months. Takes miralax daily, gas and bloating new the past couple months    11. PREGNANCY: \"Is there any chance you are pregnant? \" \"When was your last menstrual period? \"        denies    Protocols used: ABDOMINAL PAIN - FEMALE-ADULT-[place]

## 2021-12-03 ENCOUNTER — OFFICE VISIT (OUTPATIENT)
Dept: FAMILY MEDICINE CLINIC | Age: 46
End: 2021-12-03
Payer: COMMERCIAL

## 2021-12-03 VITALS
BODY MASS INDEX: 45.24 KG/M2 | HEIGHT: 64 IN | SYSTOLIC BLOOD PRESSURE: 120 MMHG | RESPIRATION RATE: 16 BRPM | DIASTOLIC BLOOD PRESSURE: 80 MMHG | OXYGEN SATURATION: 98 % | WEIGHT: 265 LBS | HEART RATE: 83 BPM | TEMPERATURE: 98.2 F

## 2021-12-03 DIAGNOSIS — F31.77 BIPOLAR DISORDER, IN PARTIAL REMISSION, MOST RECENT EPISODE MIXED (HCC): Chronic | ICD-10-CM

## 2021-12-03 DIAGNOSIS — K21.9 GASTROESOPHAGEAL REFLUX DISEASE WITHOUT ESOPHAGITIS: Chronic | ICD-10-CM

## 2021-12-03 DIAGNOSIS — E78.00 PURE HYPERCHOLESTEROLEMIA: ICD-10-CM

## 2021-12-03 DIAGNOSIS — Z11.9 SCREENING EXAMINATION FOR INFECTIOUS DISEASE: ICD-10-CM

## 2021-12-03 DIAGNOSIS — R10.12 LUQ PAIN: ICD-10-CM

## 2021-12-03 DIAGNOSIS — Z12.31 SCREENING MAMMOGRAM FOR BREAST CANCER: ICD-10-CM

## 2021-12-03 DIAGNOSIS — E11.9 TYPE 2 DIABETES MELLITUS WITHOUT COMPLICATION, WITHOUT LONG-TERM CURRENT USE OF INSULIN (HCC): ICD-10-CM

## 2021-12-03 DIAGNOSIS — K59.09 CHRONIC CONSTIPATION: Chronic | ICD-10-CM

## 2021-12-03 DIAGNOSIS — E66.01 MORBID OBESITY DUE TO EXCESS CALORIES (HCC): ICD-10-CM

## 2021-12-03 DIAGNOSIS — F51.04 PSYCHOPHYSIOLOGICAL INSOMNIA: ICD-10-CM

## 2021-12-03 DIAGNOSIS — F43.10 PTSD (POST-TRAUMATIC STRESS DISORDER): Chronic | ICD-10-CM

## 2021-12-03 DIAGNOSIS — E78.2 MIXED HYPERLIPIDEMIA: Primary | ICD-10-CM

## 2021-12-03 DIAGNOSIS — I10 ESSENTIAL HYPERTENSION: Chronic | ICD-10-CM

## 2021-12-03 DIAGNOSIS — Z12.11 SCREEN FOR COLON CANCER: ICD-10-CM

## 2021-12-03 PROBLEM — F32.9 REACTIVE DEPRESSION: Chronic | Status: RESOLVED | Noted: 2019-06-05 | Resolved: 2021-12-03

## 2021-12-03 PROBLEM — M54.2 NECK PAIN: Status: RESOLVED | Noted: 2018-10-03 | Resolved: 2021-12-03

## 2021-12-03 PROCEDURE — 99215 OFFICE O/P EST HI 40 MIN: CPT | Performed by: FAMILY MEDICINE

## 2021-12-03 PROCEDURE — 3052F HG A1C>EQUAL 8.0%<EQUAL 9.0%: CPT | Performed by: FAMILY MEDICINE

## 2021-12-03 RX ORDER — FENOFIBRATE 160 MG/1
160 TABLET ORAL DAILY
Qty: 90 TABLET | Refills: 3 | Status: SHIPPED | OUTPATIENT
Start: 2021-12-03 | End: 2022-05-19 | Stop reason: SDUPTHER

## 2021-12-03 RX ORDER — LUBIPROSTONE 24 UG/1
24 CAPSULE, GELATIN COATED ORAL
Qty: 30 CAPSULE | Refills: 5 | Status: SHIPPED | OUTPATIENT
Start: 2021-12-03 | End: 2022-01-19

## 2021-12-03 RX ORDER — ARIPIPRAZOLE 5 MG/1
5 TABLET ORAL DAILY
Qty: 90 TABLET | Refills: 1 | Status: SHIPPED | OUTPATIENT
Start: 2021-12-03 | End: 2022-05-19 | Stop reason: SDUPTHER

## 2021-12-03 RX ORDER — ATORVASTATIN CALCIUM 40 MG/1
40 TABLET, FILM COATED ORAL DAILY
Qty: 90 TABLET | Refills: 3 | Status: SHIPPED | OUTPATIENT
Start: 2021-12-03 | End: 2022-05-19 | Stop reason: SDUPTHER

## 2021-12-03 RX ORDER — GREEN TEA/HOODIA GORDONII 315-12.5MG
1 CAPSULE ORAL DAILY
Qty: 30 TABLET | Refills: 5 | Status: SHIPPED | OUTPATIENT
Start: 2021-12-03 | End: 2022-01-02

## 2021-12-03 RX ORDER — LISINOPRIL 20 MG/1
20 TABLET ORAL DAILY
Qty: 90 TABLET | Refills: 3 | Status: SHIPPED | OUTPATIENT
Start: 2021-12-03 | End: 2022-05-19 | Stop reason: SDUPTHER

## 2021-12-03 RX ORDER — SITAGLIPTIN 100 MG/1
TABLET, FILM COATED ORAL
COMMUNITY
Start: 2021-11-13 | End: 2021-12-03 | Stop reason: ALTCHOICE

## 2021-12-03 RX ORDER — LANOLIN ALCOHOL/MO/W.PET/CERES
1 CREAM (GRAM) TOPICAL DAILY
COMMUNITY

## 2021-12-03 SDOH — ECONOMIC STABILITY: FOOD INSECURITY: WITHIN THE PAST 12 MONTHS, YOU WORRIED THAT YOUR FOOD WOULD RUN OUT BEFORE YOU GOT MONEY TO BUY MORE.: NEVER TRUE

## 2021-12-03 SDOH — ECONOMIC STABILITY: FOOD INSECURITY: WITHIN THE PAST 12 MONTHS, THE FOOD YOU BOUGHT JUST DIDN'T LAST AND YOU DIDN'T HAVE MONEY TO GET MORE.: NEVER TRUE

## 2021-12-03 ASSESSMENT — SOCIAL DETERMINANTS OF HEALTH (SDOH): HOW HARD IS IT FOR YOU TO PAY FOR THE VERY BASICS LIKE FOOD, HOUSING, MEDICAL CARE, AND HEATING?: NOT HARD AT ALL

## 2021-12-03 NOTE — PROGRESS NOTES
[address] [facility], 40 y.o. female presents today with:  Chief Complaint   Patient presents with   Glenn Juan R     x2 months bloating/uncomfort in stomach, has a history of constipation. HPI    Patient is here for DM f/u. Sugars have been poorly controlled until abpaout two months ago. Is nopt using an insulin pump and her sugars are under much better control and patient has not been experiencing hyper- or hypoglycemic symptoms. Compliance with meds is good and there are no side effects. Patient exercises occasionally and tries to eat healthy. Is up to date on foot and eye exams and immunizations. BPD and PTSD. Has been going to psychiatry. Was switched from sertraline to Abilify. IIs not on it because of insurance. Abilify 5 mg was working for her. Symptoms now include mood swings, lack of concentration. SLeep is getting better with melatonin. Abdominal pain and bloating. For two months has had gassiness, belching tasting like fermentation, epigastric discomfort. Has h/o chronic constipation and has been having colicky LUQ pain. Has heartburn every day. Happens after eating lunch and dinner. Has one coffee in the morning. No soda. Drinks water. Eats a lot of tomatoes and cucumbers. Drinks a lot of lemonade. Use citrus to prepare meats. Eats oranges. If she pushes on the area it's like she is displacing air and she starts burping. Stools the last 3 days have been diarrhea. Prior to that had been hard small stools every few days. Has been trying prune juice and Miralax. Has stopped eating carbs and is eating more vegetables. Is only eating chicken, eggs or fish for protein. No bloody or black tarry stools. No unintentional weight loss. Menses is irregular on a chronic basis. Has h/o probable PCOS. No pelvic pain outside of menses.   No other questions and or concerns for today's visit      Review of Systems      Past Medical History:   Diagnosis Date    Abnormal Pap smear of cervix     Anxiety 7/10/2018    Anxiety     ASCUS with positive high risk HPV cervical 12/03/2018    Bilateral carpal tunnel syndrome 5/15/2019    Bipolar disorder, in partial remission, most recent episode mixed ([address] 12/3/2021    Bipolar disorder, in partial remission, most recent episode mixed ([address] 12/3/2021    Chronic constipation 12/3/2021    Diabetes type 2, uncontrolled [facility] [address] [facility]     Essential hypertension 5/29/2018    Fibromyalgia 5/29/2018    Gastroesophageal reflux disease without esophagitis 12/3/2021    Gastroesophageal reflux disease without esophagitis 12/3/2021    Herpes     HPV in female 12/03/2018    HPV 18    Hyperlipidemia     Obesity     PTSD (post-traumatic stress disorder) 12/3/2021    PTSD (post-traumatic stress disorder) 12/3/2021    Reactive depression 6/5/2019    Sleep apnea     Thyroid disease      History reviewed. No pertinent surgical history. Social History     Socioeconomic History    Marital status:      Spouse name: Not on file    Number of children: Not on file    Years of education: Not on file    Highest education level: Not on file   Occupational History    Occupation: [address]   Tobacco Use    Smoking status: Never Smoker    Smokeless tobacco: Never Used   Substance and Sexual Activity    Alcohol use: No    Drug use: No    Sexual activity: Yes     Partners: Male     Comment: No BC; primary infertility   Other Topics Concern    Not on file   Social History Narrative    Not on file     Social Determinants of Health     Financial Resource Strain: Low Risk     Difficulty of Paying Living Expenses: Not hard at all   Food Insecurity: No Food Insecurity    Worried About [address] in the Last Year: Never true    [address] in the Last Year: Never true   Transportation Needs:     Lack of Transportation (Medical): Not on file    Lack of Transportation (Non-Medical):  Not on file   Physical Activity:     Days of Exercise per Week: Not on file    Minutes of Exercise per Session: Not on file   Stress:     Feeling of Stress : Not on file   Social Connections:     Frequency of Communication with Friends and Family: Not on file    Frequency of Social Gatherings with Friends and Family: Not on file    Attends Confucianism Services: Not on file    Active Member of Clubs or Organizations: Not on file    Attends Club or Organization Meetings: Not on file    Marital Status: Not on file   Intimate Partner Violence:     Fear of Current or Ex-Partner: Not on file    Emotionally Abused: Not on file    Physically Abused: Not on file    Sexually Abused: Not on file   Housing Stability:     Unable to Pay for Housing in the Last Year: Not on file    Number of Places Lived in the Last Year: Not on file    Unstable Housing in the Last Year: Not on file     Family History   Problem Relation Age of Onset    Diabetes Father     High Blood Pressure Father     Retinal Detachment Father     Cancer Father         skin    Thyroid Disease Mother     Kidney Disease Sister     Inflam Bowel Dis Brother     No Known Problems Sister     Other Brother     No Known Problems Paternal Grandfather     No Known Problems Paternal Grandmother     No Known Problems Maternal Grandmother     No Known Problems Maternal Grandfather     No Known Problems Other     Breast Cancer Neg Hx     Colon Cancer Neg Hx     Eclampsia Neg Hx     Hypertension Neg Hx     Ovarian Cancer Neg Hx      Labor Neg Hx     Spont Abortions Neg Hx     Stroke Neg Hx      Allergies   Allergen Reactions    Niacin And Related Swelling and Rash     Current Outpatient Medications   Medication Sig Dispense Refill    melatonin 3 MG TABS tablet Take 1 mg by mouth daily      atorvastatin (LIPITOR) 40 MG tablet Take 1 tablet by mouth daily 90 tablet 3    lisinopril (PRINIVIL;ZESTRIL) 20 MG tablet Take 1 tablet by mouth daily 90 tablet 3    fenofibrate (TRIGLIDE) 160 MG tablet Take 1 tablet by mouth daily 90 tablet 3    lubiprostone (AMITIZA) 24 MCG capsule Take 1 capsule by mouth daily (with breakfast) 30 capsule 5    Probiotic Acidophilus (FLORANEX) TABS Take 1 tablet by mouth daily 30 tablet 5    ARIPiprazole (ABILIFY) 5 MG tablet Take 1 tablet by mouth daily 90 tablet 1    insulin lispro (HUMALOG) 100 UNIT/ML injection vial Use via insulin pump max daily dose 50 units 30 mL 3    Accu-Chek FastClix Lancets MISC Test 4x daily 150 each 3    blood glucose test strips (ACCU-CHEK GUIDE) strip Test 4x daily 150 each 3    insulin detemir (LEVEMIR FLEXTOUCH) 100 UNIT/ML injection pen 30 units at bedtime 15 pen 3    insulin lispro, 1 Unit Dial, (HUMALOG KWIKPEN) 100 UNIT/ML SOPN 6 units at each meals 15 pen 3    vitamin D (ERGOCALCIFEROL) 1.25 MG (01389 UT) CAPS capsule Take 1 capsule by mouth once a week 12 capsule 3    insulin glargine (BASAGLAR KWIKPEN) 100 UNIT/ML injection pen Inject 25 Units into the skin 2 times daily 15 pen 0    insulin lispro (HUMALOG KWIKPEN) 100 UNIT/ML pen Inject 22 Units into the skin 3 times daily (before meals) 22 units at each meal 20 pen 5    aspirin 81 MG tablet Take 1 tablet by mouth daily 90 tablet 3    Continuous Blood Gluc  (FREESTYLE EL READER) MARY Use as directed 1 Device 0    Continuous Blood Gluc Sensor (FREESTYLE EL 14 DAY SENSOR) MISC 1 Units by Does not apply route as needed (for monitoring blood glucose) 10 each 11    Insulin Pen Needle 32G X 4 MM MISC 1 each by Does not apply route 6 times daily 600 each 3    Lancets MISC 1 each by Does not apply route 3 times daily DX:E11.65, IDDM (please dispense covered brand) 300 each 3    Sodium Phosphates (FLEET) 7-19 GM/118ML Place 1 enema rectally every 2 hours as needed (constipation) 4 Bottle 0    Elastic Bandages & Supports (ABDOMINAL BINDER/ELASTIC LARGE) MISC Wear around abdomen or pelvis as needed for comfort.  1 each 1    ondansetron (ZOFRAN-ODT) 4 MG disintegrating tablet Take 1 tablet by mouth 3 times daily as needed for Nausea or Vomiting 21 tablet 0    Blood Glucose Monitoring Suppl (ONE TOUCH ULTRA 2) w/Device KIT USE AS DIRECTED TID  0    insulin lispro, 1 Unit Dial, (HUMALOG KWIKPEN) 100 UNIT/ML SOPN Inject 22 Units into the skin 3 times daily (before meals) 19.8 mL 0     No current facility-administered medications for this visit. PMH, Surgical Hx, Family Hx, and Social Hxreviewed and updated. Health Maintenance reviewed. Objective    Vitals:    12/03/21 0902   BP: 120/80   Site: Left Upper Arm   Position: Sitting   Cuff Size: Large Adult   Pulse: 83   Resp: 16   Temp: 98.2 °F (36.8 °C)   TempSrc: Temporal   SpO2: 98%   Weight: 265 lb (120.2 kg)   Height: 5' 4\" (1.626 m)        Physical Exam  Constitutional:       General: She is not in acute distress. Appearance: She is well-developed. HENT:      Head: Normocephalic and atraumatic. Eyes:      General: No scleral icterus. Conjunctiva/sclera: Conjunctivae normal.   Cardiovascular:      Rate and Rhythm: Normal rate and regular rhythm. Heart sounds: Normal heart sounds. Pulmonary:      Effort: Pulmonary effort is normal. No respiratory distress. Breath sounds: Normal breath sounds. No wheezing or rales. Abdominal:      General: Bowel sounds are normal. There is no distension. Palpations: Abdomen is soft. There is no mass. Tenderness: There is abdominal tenderness (LUQ). There is no guarding or rebound. Skin:     General: Skin is warm and dry. Neurological:      Mental Status: She is alert and oriented to person, place, and time. Psychiatric:         Mood and Affect: Mood normal.         Behavior: Behavior normal.         Thought Content:  Thought content normal.         Judgment: Judgment normal.           Lab Results   Component Value Date    LABA1C 8.4 (H) 12/03/2021    LABA1C 11.4 09/23/2021    LABA1C 13.0 (H) 12/20/2019     Lab Results   Component Value Date    LABMICR 13.60 (H) 12/03/2021    CREATININE 0.67 12/03/2021     Lab Results   Component Value Date     (H) 12/20/2019     (H) 12/20/2019     Lab Results   Component Value Date    CHOL 199 03/10/2018    TRIG 163 03/10/2018    HDL 31 (L) 12/03/2021    LDLCALC 131 (H) 12/03/2021        Assessment & Plan   Visit Diagnoses and Associated Orders     Mixed hyperlipidemia    -  Primary    follow labs    atorvastatin (LIPITOR) 40 MG tablet [[id number]]      fenofibrate (TRIGLIDE) 160 MG tablet [[id number]]           Essential hypertension        Stable, well controlled on current meds. Follow clinically. Follow labs. lisinopril (PRINIVIL;ZESTRIL) 20 MG tablet [[id number]]           LUQ pain        suspect related to constipation. Hepatic Function Panel [[id number] Custom]   - Future Order    US ABDOMEN LIMITED [[id number] Custom]   - Future Order         Pure hypercholesterolemia        Stable well-controlled on current meds. Follow labs.          Bipolar disorder, in partial remission, most recent episode mixed ([address]        worse, poor control b/c off meds d/t insurance change; restart ABilify    ARIPiprazole (ABILIFY) 5 MG tablet [[id number]]           PTSD (post-traumatic stress disorder)        See above    ARIPiprazole (ABILIFY) 5 MG tablet [[id number]]           Type 2 diabetes mellitus without complication, without long-term current use of insulin (HCC)        improving, fair control; reviewed diet and exercise and importance of weight loss and exercise         Morbid obesity due to excess calories (HCC)        improving, fair control, see above    CBC With Auto Differential [id number] Custom]   - Future Order    Hepatic Function Panel [[id number] Custom]   - Future Order    US ABDOMEN LIMITED [[id number] Custom]   - Future Order         Screening examination for infectious disease        Hepatitis C Antibody [[id number] Custom]   - Future Order    HIV Screen [[id number] Custom]   - Future Order         Screen for colon cancer        [address] Nicole Orellana MD, Gastroenterology, [facility] [[id number] Custom]           Gastroesophageal reflux disease without esophagitis        reviewed sx reduction strategies         Chronic constipation        add Miralax and maintain permanently    lubiprostone (AMITIZA) 24 MCG capsule [[id number]]      Pro[facility]otic [facility]us [facility] TABS [[id number]]           Screening mammogram for breast cancer        NATASHA DIGITAL SCREEN W OR WO CAD BILATERAL [[id number] Custom]   - Future Order         Psychophysiological insomnia        melatonin 3 MG TABS tablet [[id number]]                   Reviewed with the patient: all disease processes, current clinical status, medications, activities and diet.      Side effects, adverse effects of the medication prescribed today, as well as treatment plan/ rationale and result expectations have been discussed with the patient who expresses understanding and desires to proceed.     Close follow up to evaluate treatment results and for coordination of care. I have reviewed the patient's medical history in detail and updated the computerized patient record. More than 50% of the appointment was spent in face-to-face counseling, education and care coordination. Time spent on appointment included reviewing past laboratory and radiographic results, previous notes, consultations, past procedures, medications, obtaining history and performing physical, formulating mutually agreed upon plan of care with patient - 45 minutes. Orders Placed This Encounter   Procedures    US ABDOMEN LIMITED     This procedure can be scheduled via [facility]. Access your [facility] account by visiting [email or web address]. Standing Status:   Future     Standing Expiration Date:   12/3/2022     Order Specific Question:   Reason for exam:     Answer:   suspect GRIMM, LUQ pain     Order Specific Question:   Specify organ? Answer:   LIVER     Order Specific Question:   Specify organ?      Answer:   SPLEEN    NATASHA DIGITAL SCREEN W OR WO CAD BILATERAL Standing Status:   Future     Standing Expiration Date:   2/3/2023     Order Specific Question:   Reason for exam:     Answer:   screen    CBC With Auto Differential     Standing Status:   Future     Standing Expiration Date:   12/3/2022    Hepatitis C Antibody     Standing Status:   Future     Standing Expiration Date:   6/3/2022    HIV Screen     Standing Status:   Future     Standing Expiration Date:   12/3/2022    Hepatic Function Panel     Standing Status:   Future     Standing Expiration Date:   12/3/2022   Brian Ricks MD, Gastroenterology, [place]     Referral Priority:   Routine     Referral Type:   Eval and Treat     Referral Reason:   Specialty Services Required     Referred to Provider:   Ras Quezada MD     Requested Specialty:   Gastroenterology     Number of Visits Requested:   1     Orders Placed This Encounter   Medications    atorvastatin (LIPITOR) 40 MG tablet     Sig: Take 1 tablet by mouth daily     Dispense:  90 tablet     Refill:  3    lisinopril (PRINIVIL;ZESTRIL) 20 MG tablet     Sig: Take 1 tablet by mouth daily     Dispense:  90 tablet     Refill:  3    fenofibrate (TRIGLIDE) 160 MG tablet     Sig: Take 1 tablet by mouth daily     Dispense:  90 tablet     Refill:  3    lubiprostone (AMITIZA) 24 MCG capsule     Sig: Take 1 capsule by mouth daily (with breakfast)     Dispense:  30 capsule     Refill:  5    Probiotic Acidophilus (FLORANEX) TABS     Sig: Take 1 tablet by mouth daily     Dispense:  30 tablet     Refill:  5    ARIPiprazole (ABILIFY) 5 MG tablet     Sig: Take 1 tablet by mouth daily     Dispense:  90 tablet     Refill:  1     Medications Discontinued During This Encounter   Medication Reason    JANUVIA 100 MG tablet Therapy completed    Dulaglutide 1.5 MG/0.5ML SOPN LIST CLEANUP    Dulaglutide (TRULICITY) 1.5 SS/3.3OE SOPN Therapy completed    metFORMIN (GLUCOPHAGE) 1000 MG tablet Therapy completed    sertraline (ZOLOFT) 50 MG tablet Therapy completed  amitriptyline (ELAVIL) 25 MG tablet Therapy completed    atorvastatin (LIPITOR) 40 MG tablet REORDER    fenofibrate 160 MG tablet REORDER    lisinopril (PRINIVIL;ZESTRIL) 20 MG tablet REORDER     Return in about 2 months (around 2/3/2022) for constipation  - OV or DOXY . Controlled Substance Monitoring:    Acute and Chronic Pain Monitoring:   RX Monitoring 5/15/2019   Attestation The Prescription Monitoring Report for this patient was reviewed today. Periodic Controlled Substance Monitoring No signs of potential drug abuse or diversion identified: otherwise, see note documentation;Obtaining appropriate analgesic effect of treatment.;Possible medication side effects, risk of tolerance/dependence & alternative treatments discussed. Chronic Pain > 50 MEDD Obtained or confirmened \"Consent of Opioid Use\" on file.;Considered consultation with a specialist.;Re-evaluated the status of the patient's underlying condition causing pain.    Chronic Pain > 80 MEDD -           Haider PADILLA MD

## 2021-12-23 ENCOUNTER — HOSPITAL ENCOUNTER (OUTPATIENT)
Dept: WOMENS IMAGING | Age: 46
Discharge: HOME | End: 2021-12-25
Payer: COMMERCIAL

## 2021-12-23 ENCOUNTER — HOSPITAL ENCOUNTER (OUTPATIENT)
Dept: ULTRASOUND IMAGING | Age: 46
Discharge: HOME | End: 2021-12-25
Payer: COMMERCIAL

## 2021-12-23 DIAGNOSIS — R10.12 LUQ PAIN: ICD-10-CM

## 2021-12-23 DIAGNOSIS — E66.01 MORBID OBESITY DUE TO EXCESS CALORIES (HCC): ICD-10-CM

## 2021-12-23 DIAGNOSIS — Z12.31 SCREENING MAMMOGRAM FOR BREAST CANCER: ICD-10-CM

## 2021-12-23 PROCEDURE — 76705 ECHO EXAM OF ABDOMEN: CPT

## 2021-12-23 PROCEDURE — 77063 BREAST TOMOSYNTHESIS BI: CPT

## 2022-01-04 ENCOUNTER — OFFICE VISIT (OUTPATIENT)
Dept: ENDOCRINOLOGY | Age: 47
End: 2022-01-04
Payer: COMMERCIAL

## 2022-01-04 VITALS
DIASTOLIC BLOOD PRESSURE: 84 MMHG | SYSTOLIC BLOOD PRESSURE: 126 MMHG | WEIGHT: 265 LBS | HEART RATE: 89 BPM | HEIGHT: 64 IN | BODY MASS INDEX: 45.24 KG/M2

## 2022-01-04 DIAGNOSIS — E11.9 TYPE 2 DIABETES MELLITUS WITHOUT COMPLICATION, WITHOUT LONG-TERM CURRENT USE OF INSULIN (HCC): Primary | ICD-10-CM

## 2022-01-04 LAB
CHP ED QC CHECK: NORMAL
GLUCOSE BLD-MCNC: 188 MG/DL

## 2022-01-04 PROCEDURE — 82962 GLUCOSE BLOOD TEST: CPT | Performed by: INTERNAL MEDICINE

## 2022-01-04 PROCEDURE — 99213 OFFICE O/P EST LOW 20 MIN: CPT | Performed by: INTERNAL MEDICINE

## 2022-01-04 RX ORDER — LANCETS
EACH MISCELLANEOUS
Qty: 150 EACH | Refills: 3 | Status: SHIPPED | OUTPATIENT
Start: 2022-01-04

## 2022-01-04 RX ORDER — BLOOD SUGAR DIAGNOSTIC
STRIP MISCELLANEOUS
Qty: 150 EACH | Refills: 3 | Status: SHIPPED | OUTPATIENT
Start: 2022-01-04

## 2022-01-04 NOTE — PROGRESS NOTES
1/4/2022    Assessment:       Diagnosis Orders   1. Type 2 diabetes mellitus without complication, without long-term current use of insulin (Formerly [facility])  POCT Glucose         PLAN:     Orders Placed This Encounter   Procedures    Basic Metabolic Panel     Standing Status:   Future     Standing Expiration Date:   1/4/2023    Hemoglobin A1C     Standing Status:   Future     Standing Expiration Date:   1/4/2023    POCT Glucose     Orders Placed This Encounter   Medications    insulin lispro (HUMALOG) 100 UNIT/ML injection vial     Sig: Use via insulin pump max daily dose 50 units     Dispense:  30 mL     Refill:  3    Accu-Chek FastClix Lancets MISC     Sig: Test 4x daily     Dispense:  150 each     Refill:  3    blood glucose test strips (ACCU-CHEK GUIDE) strip     Sig: Test 4x daily     Dispense:  150 each     Refill:  3     Continue Humalog via pump as per current pump setting A1c goal of 7 or lower    Orders Placed This Encounter   Procedures    POCT Glucose       Subjective:     Chief Complaint   Patient presents with    Diabetes     Vitals:    01/04/22 1509   BP: 126/84   Site: Left Upper Arm   Position: Sitting   Cuff Size: Large Adult   Pulse: 89   Weight: 265 lb (120.2 kg)   Height: 5' 4\" (1.626 m)     Wt Readings from Last 3 Encounters:   01/04/22 265 lb (120.2 kg)   12/03/21 265 lb (120.2 kg)   09/23/21 267 lb (121.1 kg)     BP Readings from Last 3 Encounters:   01/04/22 126/84   12/03/21 120/80   09/23/21 (!) 140/84     Follow-up on type 2 diabetes patient is on Medtronic 770 pump with sensor A1c has come down to 8.4 from 11.4    Diabetes  She presents for her follow-up diabetic visit. She has type 2 diabetes mellitus. Symptoms are improving. Current diabetic treatment includes insulin pump. Her overall blood glucose range is 180-200 mg/dl.  (Reviewed 2-week pump download average blood sugar 175±31  78% time in range  Auto mode 90%  Pump setting basal rate 2 units/h carb ratio 18 sensitivity 50    Lab Results       Component                Value               Date                       LABA1C                   8.4 (H)             12/03/2021              )     Past Medical History:   Diagnosis Date    Abnormal Pap smear of cervix     Anxiety 7/10/2018    Anxiety     ASCUS with positive high risk HPV cervical 12/03/2018    Bilateral carpal tunnel syndrome 5/15/2019    Bipolar disorder, in partial remission, most recent episode mixed ([address] 12/3/2021    Bipolar disorder, in partial remission, most recent episode mixed ([address] 12/3/2021    Chronic constipation 12/3/2021    Diabetes type 2, uncontrolled ([address]     Essential hypertension 5/29/2018    Fibromyalgia 5/29/2018    Gastroesophageal reflux disease without esophagitis 12/3/2021    Gastroesophageal reflux disease without esophagitis 12/3/2021    Herpes     HPV in female 12/03/2018    HPV 18    Hyperlipidemia     Obesity     PTSD (post-traumatic stress disorder) 12/3/2021    PTSD (post-traumatic stress disorder) 12/3/2021    Reactive depression 6/5/2019    Sleep apnea     Thyroid disease      No past surgical history on file.   Social History     Socioeconomic History    Marital status:      Spouse name: Not on file    Number of children: Not on file    Years of education: Not on file    Highest education level: Not on file   Occupational History    Occupation: [employer]   Tobacco Use    Smoking status: Never Smoker    Smokeless tobacco: Never Used   Substance and Sexual Activity    Alcohol use: No    Drug use: No    Sexual activity: Yes     Partners: Male     Comment: No BC; primary infertility   Other Topics Concern    Not on file   Social History Narrative    Not on file     Social Determinants of Health     Financial Resource Strain: Low Risk     Difficulty of Paying Living Expenses: Not hard at all   Food Insecurity: No Food Insecurity    Worried About [employer] in the Last Year: Never true   [employer] of [employer] in the Last Year: Never true   Transportation Needs:     Lack of Transportation (Medical): Not on file    Lack of Transportation (Non-Medical):  Not on file   Physical Activity:     Days of Exercise per Week: Not on file    Minutes of Exercise per Session: Not on file   Stress:     Feeling of Stress : Not on file   Social Connections:     Frequency of Communication with Friends and Family: Not on file    Frequency of Social Gatherings with Friends and Family: Not on file    Attends Jew Services: Not on file    Active Member of Clubs or Organizations: Not on file    Attends Club or Organization Meetings: Not on file    Marital Status: Not on file   Intimate Partner Violence:     Fear of Current or Ex-Partner: Not on file    Emotionally Abused: Not on file    Physically Abused: Not on file    Sexually Abused: Not on file   Housing Stability:     Unable to Pay for Housing in the Last Year: Not on file    Number of Places Lived in the Last Year: Not on file    Unstable Housing in the Last Year: Not on file     Family History   Problem Relation Age of Onset    Diabetes Father     High Blood Pressure Father     Retinal Detachment Father     Cancer Father         skin    Thyroid Disease Mother     Kidney Disease Sister     Inflam Bowel Dis Brother     No Known Problems Sister     Other Brother     No Known Problems Paternal Grandfather     No Known Problems Paternal Grandmother     No Known Problems Maternal Grandmother     No Known Problems Maternal Grandfather     No Known Problems Other     Breast Cancer Maternal Aunt     Colon Cancer Neg Hx     Eclampsia Neg Hx     Hypertension Neg Hx     Ovarian Cancer Neg Hx      Labor Neg Hx     Spont Abortions Neg Hx     Stroke Neg Hx      Allergies   Allergen Reactions    Niacin And Related Swelling and Rash       Current Outpatient Medications:     melatonin 3 MG TABS tablet, Take 1 mg by mouth daily, Disp: , Rfl:    atorvastatin (LIPITOR) 40 MG tablet, Take 1 tablet by mouth daily, Disp: 90 tablet, Rfl: 3    lisinopril (PRINIVIL;ZESTRIL) 20 MG tablet, Take 1 tablet by mouth daily, Disp: 90 tablet, Rfl: 3    fenofibrate (TRIGLIDE) 160 MG tablet, Take 1 tablet by mouth daily, Disp: 90 tablet, Rfl: 3    lubiprostone (AMITIZA) 24 MCG capsule, Take 1 capsule by mouth daily (with breakfast), Disp: 30 capsule, Rfl: 5    ARIPiprazole (ABILIFY) 5 MG tablet, Take 1 tablet by mouth daily, Disp: 90 tablet, Rfl: 1    insulin lispro (HUMALOG) 100 UNIT/ML injection vial, Use via insulin pump max daily dose 50 units, Disp: 30 mL, Rfl: 3    Accu-Chek FastClix Lancets MISC, Test 4x daily, Disp: 150 each, Rfl: 3    blood glucose test strips (ACCU-CHEK GUIDE) strip, Test 4x daily, Disp: 150 each, Rfl: 3    vitamin D (ERGOCALCIFEROL) 1.25 MG (71004 UT) CAPS capsule, Take 1 capsule by mouth once a week, Disp: 12 capsule, Rfl: 3    aspirin 81 MG tablet, Take 1 tablet by mouth daily, Disp: 90 tablet, Rfl: 3    Insulin Pen Needle 32G X 4 MM MISC, 1 each by Does not apply route 6 times daily, Disp: 600 each, Rfl: 3    Lancets MISC, 1 each by Does not apply route 3 times daily DX:E11.65, IDDM (please dispense covered brand), Disp: 300 each, Rfl: 3    Sodium Phosphates (FLEET) 7-19 GM/118ML, Place 1 enema rectally every 2 hours as needed (constipation), Disp: 4 Bottle, Rfl: 0    Elastic Bandages & Supports (ABDOMINAL BINDER/ELASTIC LARGE) MISC, Wear around abdomen or pelvis as needed for comfort., Disp: 1 each, Rfl: 1    ondansetron (ZOFRAN-ODT) 4 MG disintegrating tablet, Take 1 tablet by mouth 3 times daily as needed for Nausea or Vomiting, Disp: 21 tablet, Rfl: 0    Blood Glucose Monitoring Suppl (ONE TOUCH ULTRA 2) w/Device KIT, USE AS DIRECTED TID, Disp: , Rfl: 0  Lab Results   Component Value Date     12/03/2021    K 4.3 12/03/2021     12/03/2021    CO2 22 12/03/2021    BUN 15 12/03/2021    CREATININE 0.67 12/03/2021 GLUCOSE 188 01/04/2022    CALCIUM 9.3 12/03/2021    PROT 8.0 12/07/2021    LABALBU 4.1 12/07/2021    BILITOT 0.4 12/07/2021    ALKPHOS 66 12/07/2021    AST 31 12/07/2021    ALT 57 (H) 12/07/2021    LABGLOM >60.0 12/03/2021    GFRAA >60.0 12/03/2021    GLOB 4.1 (H) 12/20/2019     Lab Results   Component Value Date    WBC 7.8 12/07/2021    HGB 13.4 12/07/2021    HCT 40.6 12/07/2021    MCV 82.4 12/07/2021     12/07/2021     Lab Results   Component Value Date    LABA1C 8.4 (H) 12/03/2021    LABA1C 11.4 09/23/2021    LABA1C 13.0 (H) 12/20/2019     Lab Results   Component Value Date    CHOLFAST 195 12/03/2021    CHOLFAST 144 06/16/2018    TRIGLYCFAST 163 (H) 12/03/2021    TRIGLYCFAST 187 06/16/2018    HDL 31 (L) 12/03/2021    HDL 30 (L) 06/16/2018    HDL 32 (L) 03/10/2018    LDLCALC 131 (H) 12/03/2021    LDLCALC 77 06/16/2018    LDLCALC 134 (H) 03/10/2018    CHOL 199 03/10/2018    CHOL 197 09/12/2015    CHOL 205 (H) 03/14/2015    TRIG 163 03/10/2018    TRIG 361 (H) 09/12/2015    TRIG 243 (H) 03/14/2015     No results found for: TESTM  Lab Results   Component Value Date    TSH 2.080 10/27/2021    TSH 3.380 03/04/2019    TSH 2.180 05/31/2018    T4FREE 0.93 10/27/2021    T4FREE 0.92 03/04/2019    T4FREE 0.92 (L) 09/12/2015     No results found for: TPOABS    Review of Systems   Eyes: Negative. Cardiovascular: Negative. Endocrine: Negative. Psychiatric/Behavioral: Negative. All other systems reviewed and are negative. Objective:   Physical Exam  Vitals reviewed. Constitutional:       Appearance: Normal appearance. She is obese. HENT:      Head: Normocephalic and atraumatic. Hair is normal.      Right Ear: External ear normal.      Left Ear: External ear normal.      Nose: Nose normal.   Eyes:      General: No scleral icterus. Right eye: No discharge. Left eye: No discharge. Extraocular Movements: Extraocular movements intact.       Conjunctiva/sclera: Conjunctivae normal.   Neck: Trachea: Trachea normal.   Cardiovascular:      Rate and Rhythm: Normal rate and regular rhythm. Pulmonary:      Effort: Pulmonary effort is normal.   Musculoskeletal:         General: Normal range of motion. Cervical back: Normal range of motion and neck supple. Neurological:      General: No focal deficit present. Mental Status: She is alert and oriented to person, place, and time.    Psychiatric:         Mood and Affect: Mood normal.         Behavior: Behavior normal.

## 2022-01-15 RX ORDER — POLYETHYLENE GLYCOL 3350, SODIUM CHLORIDE, SODIUM BICARBONATE, POTASSIUM CHLORIDE 420; 11.2; 5.72; 1.48 G/4L; G/4L; G/4L; G/4L
4000 POWDER, FOR SOLUTION ORAL ONCE
Qty: 4000 ML | Refills: 0 | Status: SHIPPED | OUTPATIENT
Start: 2022-01-15 | End: 2022-01-15

## 2022-01-19 ENCOUNTER — OFFICE VISIT (OUTPATIENT)
Dept: FAMILY MEDICINE CLINIC | Age: 47
End: 2022-01-19
Payer: COMMERCIAL

## 2022-01-19 VITALS
WEIGHT: 271 LBS | SYSTOLIC BLOOD PRESSURE: 138 MMHG | HEIGHT: 64 IN | DIASTOLIC BLOOD PRESSURE: 78 MMHG | RESPIRATION RATE: 16 BRPM | TEMPERATURE: 96.5 F | HEART RATE: 83 BPM | OXYGEN SATURATION: 98 % | BODY MASS INDEX: 46.26 KG/M2

## 2022-01-19 DIAGNOSIS — E28.2 PCOS (POLYCYSTIC OVARIAN SYNDROME): ICD-10-CM

## 2022-01-19 DIAGNOSIS — G89.29 CHRONIC PAIN OF BOTH KNEES: ICD-10-CM

## 2022-01-19 DIAGNOSIS — E66.01 MORBID OBESITY DUE TO EXCESS CALORIES (HCC): Primary | ICD-10-CM

## 2022-01-19 DIAGNOSIS — K21.9 GASTROESOPHAGEAL REFLUX DISEASE WITHOUT ESOPHAGITIS: ICD-10-CM

## 2022-01-19 DIAGNOSIS — E78.00 PURE HYPERCHOLESTEROLEMIA: ICD-10-CM

## 2022-01-19 DIAGNOSIS — G47.33 OSA (OBSTRUCTIVE SLEEP APNEA): ICD-10-CM

## 2022-01-19 DIAGNOSIS — M25.561 CHRONIC PAIN OF BOTH KNEES: ICD-10-CM

## 2022-01-19 DIAGNOSIS — E11.9 TYPE 2 DIABETES MELLITUS WITHOUT COMPLICATION, WITHOUT LONG-TERM CURRENT USE OF INSULIN (HCC): ICD-10-CM

## 2022-01-19 DIAGNOSIS — I10 ESSENTIAL HYPERTENSION: ICD-10-CM

## 2022-01-19 DIAGNOSIS — K59.09 CHRONIC CONSTIPATION: ICD-10-CM

## 2022-01-19 DIAGNOSIS — Z23 NEED FOR INFLUENZA VACCINATION: ICD-10-CM

## 2022-01-19 DIAGNOSIS — M25.562 CHRONIC PAIN OF BOTH KNEES: ICD-10-CM

## 2022-01-19 PROCEDURE — 99214 OFFICE O/P EST MOD 30 MIN: CPT | Performed by: FAMILY MEDICINE

## 2022-01-19 PROCEDURE — 90471 IMMUNIZATION ADMIN: CPT | Performed by: FAMILY MEDICINE

## 2022-01-19 PROCEDURE — 90674 CCIIV4 VAC NO PRSV 0.5 ML IM: CPT | Performed by: FAMILY MEDICINE

## 2022-01-19 RX ORDER — DOCUSATE SODIUM 100 MG/1
100 CAPSULE, LIQUID FILLED ORAL 2 TIMES DAILY PRN
Qty: 180 CAPSULE | Refills: 4 | Status: SHIPPED | OUTPATIENT
Start: 2022-01-19

## 2022-01-19 RX ORDER — LUBIPROSTONE 24 UG/1
24 CAPSULE, GELATIN COATED ORAL 2 TIMES DAILY WITH MEALS
Qty: 180 CAPSULE | Refills: 4 | Status: SHIPPED | OUTPATIENT
Start: 2022-01-19 | End: 2022-05-19

## 2022-01-19 ASSESSMENT — PATIENT HEALTH QUESTIONNAIRE - PHQ9
SUM OF ALL RESPONSES TO PHQ9 QUESTIONS 1 & 2: 0
6. FEELING BAD ABOUT YOURSELF - OR THAT YOU ARE A FAILURE OR HAVE LET YOURSELF OR YOUR FAMILY DOWN: 0
SUM OF ALL RESPONSES TO PHQ QUESTIONS 1-9: 0
3. TROUBLE FALLING OR STAYING ASLEEP: 0
8. MOVING OR SPEAKING SO SLOWLY THAT OTHER PEOPLE COULD HAVE NOTICED. OR THE OPPOSITE, BEING SO FIGETY OR RESTLESS THAT YOU HAVE BEEN MOVING AROUND A LOT MORE THAN USUAL: 0
2. FEELING DOWN, DEPRESSED OR HOPELESS: 0
4. FEELING TIRED OR HAVING LITTLE ENERGY: 0
10. IF YOU CHECKED OFF ANY PROBLEMS, HOW DIFFICULT HAVE THESE PROBLEMS MADE IT FOR YOU TO DO YOUR WORK, TAKE CARE OF THINGS AT HOME, OR GET ALONG WITH OTHER PEOPLE: 0
9. THOUGHTS THAT YOU WOULD BE BETTER OFF DEAD, OR OF HURTING YOURSELF: 0
SUM OF ALL RESPONSES TO PHQ QUESTIONS 1-9: 0
7. TROUBLE CONCENTRATING ON THINGS, SUCH AS READING THE NEWSPAPER OR WATCHING TELEVISION: 0
1. LITTLE INTEREST OR PLEASURE IN DOING THINGS: 0
5. POOR APPETITE OR OVEREATING: 0

## 2022-01-19 NOTE — PROGRESS NOTES
[facility], 40 y.o. female presents today with:  Chief Complaint   Patient presents with    Follow-up     is still having issues trying to go to the bathroom; medications aren't working; is dieting and is still concerned about herself gaining weight     Discuss Labs           HPI    12/03/2021:   Patient is here for DM f/u. Sugars have been poorly controlled until abpaout two months ago. Is nopt using an insulin pump and her sugars are under much better control and patient has not been experiencing hyper- or hypoglycemic symptoms. Compliance with meds is good and there are no side effects. Patient exercises occasionally and tries to eat healthy. Is up to date on foot and eye exams and immunizations.     BPD and PTSD. Has been going to psychiatry. Was switched from sertraline to Abilify. IIs not on it because of insurance. Abilify 5 mg was working for her. Symptoms now include mood swings, lack of concentration. SLeep is getting better with melatonin.     Abdominal pain and bloating. For two months has had gassiness, belching tasting like fermentation, epigastric discomfort. Has h/o chronic constipation and has been having colicky LUQ pain. Has heartburn every day. Happens after eating lunch and dinner. Has one coffee in the morning. No soda. Drinks water. Eats a lot of tomatoes and cucumbers. Drinks a lot of lemonade. Use citrus to prepare meats. Eats oranges. If she pushes on the area it's like she is displacing air and she starts burping. Stools the last 3 days have been diarrhea. Prior to that had been hard small stools every few days. Has been trying prune juice and Miralax. Has stopped eating carbs and is eating more vegetables. Is only eating chicken, eggs or fish for protein. No bloody or black tarry stools. No unintentional weight loss.       Menses is irregular on a chronic basis. Has h/o probable PCOS. No pelvic pain outside of menses. 01/19/2022:  At last visit it was suspected that her chronic constipation was causing her left upper quadrant quadrant pain. She was placed on MiraLAX and Amitiza 24 mg once a day. Labs on 12/7/2021: AST 57 down from 185 2019, 53 2000 1879, 2018; AST 31 down from 128, 36, 73, 46. Microalbumin was elevated. LDL not at goal now 131 from 77. Hemoglobin A1c 8.4 down from 11.4. Liver ultrasound 12/23/2021 shows slightly increased hepatic echogenicity often seen with fatty infiltration. Constipation. Persists in spite of Amitiza and MiraLAX 4 times a day. Weight gain. 6 pounds in 6 weeks. GRIMM. Labs improved however appears to be worsening based on scan. No other questions and or concerns for today's visit      Review of Systems      Past Medical History:   Diagnosis Date    Abnormal Pap smear of cervix     Anxiety 7/10/2018    Anxiety     ASCUS with positive high risk HPV cervical 12/03/2018    Bilateral carpal tunnel syndrome 5/15/2019    Bipolar disorder, in partial remission, most recent episode mixed ([address] 12/3/2021    Bipolar disorder, in partial remission, most recent episode mixed ([address] 12/3/2021    Chronic constipation 12/3/2021    Diabetes type 2, uncontrolled ([address]     Essential hypertension 5/29/2018    Fibromyalgia 5/29/2018    Gastroesophageal reflux disease without esophagitis 12/3/2021    Gastroesophageal reflux disease without esophagitis 12/3/2021    Herpes     HPV in female 12/03/2018    HPV 18    Hyperlipidemia     Obesity     PTSD (post-traumatic stress disorder) 12/3/2021    PTSD (post-traumatic stress disorder) 12/3/2021    Reactive depression 6/5/2019    Sleep apnea     Thyroid disease      History reviewed. No pertinent surgical history.   Social History     Socioeconomic History    Marital status:      Spouse name: Not on file    Number of children: Not on file    Years of education: Not on file    Highest education level: Not on file   Occupational History    Occupation: PSR [employer]   Tobacco Use    Smoking status: Never Smoker    Smokeless tobacco: Never Used   Substance and Sexual Activity    Alcohol use: No    Drug use: No    Sexual activity: Yes     Partners: Male     Comment: No BC; primary infertility   Other Topics Concern    Not on file   Social History Narrative    Not on file     Social Determinants of Health     Financial Resource Strain: Low Risk     Difficulty of Paying Living Expenses: Not hard at all   Food Insecurity: No Food Insecurity    Worried About Running Out of Food in the Last Year: Never true    Klaudia of Food in the Last Year: Never true   Transportation Needs:     Lack of Transportation (Medical): Not on file    Lack of Transportation (Non-Medical):  Not on file   Physical Activity:     Days of Exercise per Week: Not on file    Minutes of Exercise per Session: Not on file   Stress:     Feeling of Stress : Not on file   Social Connections:     Frequency of Communication with Friends and Family: Not on file    Frequency of Social Gatherings with Friends and Family: Not on file    Attends Alevism Services: Not on file    Active Member of Clubs or Organizations: Not on file    Attends Club or Organization Meetings: Not on file    Marital Status: Not on file   Intimate Partner Violence:     Fear of Current or Ex-Partner: Not on file    Emotionally Abused: Not on file    Physically Abused: Not on file    Sexually Abused: Not on file   Housing Stability:     Unable to Pay for Housing in the Last Year: Not on file    Number of [place] in the Last Year: Not on file    Unstable Housing in the Last Year: Not on file     Family History   Problem Relation Age of Onset    Diabetes Father     High Blood Pressure Father     Retinal Detachment Father     Cancer Father         skin    Thyroid Disease Mother     Kidney Disease Sister     Inflam Bowel Dis Brother     No Known Problems Sister     Other Brother     No Known Problems Paternal Grandfather     No Known Problems Paternal Grandmother     No Known Problems Maternal Grandmother     No Known Problems Maternal Grandfather     No Known Problems Other     Breast Cancer Maternal Aunt     Colon Cancer Neg Hx     Eclampsia Neg Hx     Hypertension Neg Hx     Ovarian Cancer Neg Hx      Labor Neg Hx     Spont Abortions Neg Hx     Stroke Neg Hx      Allergies   Allergen Reactions    Niacin And Related Swelling and Rash     Current Outpatient Medications   Medication Sig Dispense Refill    lubiprostone (AMITIZA) 24 MCG capsule Take 1 capsule by mouth 2 times daily (with meals) 180 capsule 4    docusate sodium (COLACE) 100 MG capsule Take 1 capsule by mouth 2 times daily as needed for Constipation 180 capsule 4    insulin lispro (HUMALOG) 100 UNIT/ML injection vial Use via insulin pump max daily dose 50 units 30 mL 3    Accu-Chek FastClix Lancets MISC Test 4x daily 150 each 3    blood glucose test strips (ACCU-CHEK GUIDE) strip Test 4x daily 150 each 3    melatonin 3 MG TABS tablet Take 1 mg by mouth daily      atorvastatin (LIPITOR) 40 MG tablet Take 1 tablet by mouth daily 90 tablet 3    lisinopril (PRINIVIL;ZESTRIL) 20 MG tablet Take 1 tablet by mouth daily 90 tablet 3    fenofibrate (TRIGLIDE) 160 MG tablet Take 1 tablet by mouth daily 90 tablet 3    ARIPiprazole (ABILIFY) 5 MG tablet Take 1 tablet by mouth daily 90 tablet 1    vitamin D (ERGOCALCIFEROL) 1.25 MG (99391 UT) CAPS capsule Take 1 capsule by mouth once a week 12 capsule 3    aspirin 81 MG tablet Take 1 tablet by mouth daily 90 tablet 3    Lancets MISC 1 each by Does not apply route 3 times daily DX:E11.65, IDDM (please dispense covered brand) 300 each 3    Sodium Phosphates (FLEET) 7-19 GM/118ML Place 1 enema rectally every 2 hours as needed (constipation) 4 Bottle 0    Elastic Bandages & Supports (ABDOMINAL BINDER/ELASTIC LARGE) MISC Wear around abdomen or pelvis as needed for comfort.  1 each 1  ondansetron (ZOFRAN-ODT) 4 MG disintegrating tablet Take 1 tablet by mouth 3 times daily as needed for Nausea or Vomiting 21 tablet 0    Blood Glucose Monitoring Suppl (ONE TOUCH ULTRA 2) w/Device KIT USE AS DIRECTED TID  0    Insulin Pen Needle 32G X 4 MM MISC 1 each by Does not apply route 6 times daily (Patient not taking: Reported on 1/19/2022) 600 each 3     No current facility-administered medications for this visit. PMH, Surgical Hx, Family Hx, and Social Hxreviewed and updated. Health Maintenance reviewed. Objective    Vitals:    01/19/22 0830   BP: 138/78   Pulse: 83   Resp: 16   Temp: 96.5 °F (35.8 °C)   TempSrc: Temporal   SpO2: 98%   Weight: 271 lb (122.9 kg)   Height: 5' 4\" (1.626 m)        Physical Exam  Constitutional:       Appearance: She is obese. HENT:      Head: Normocephalic. Eyes:      Conjunctiva/sclera: Conjunctivae normal.   Pulmonary:      Effort: Pulmonary effort is normal.   Neurological:      Mental Status: She is alert and oriented to person, place, and time. Psychiatric:         Behavior: Behavior normal.         Thought Content:  Thought content normal.         Judgment: Judgment normal.           Lab Results   Component Value Date    LABA1C 8.4 (H) 12/03/2021    LABA1C 11.4 09/23/2021    LABA1C 13.0 (H) 12/20/2019     Lab Results   Component Value Date    LABMICR 13.60 (H) 12/03/2021    CREATININE 0.67 12/03/2021     Lab Results   Component Value Date    ALT 57 (H) 12/07/2021    AST 31 12/07/2021     Lab Results   Component Value Date    CHOL 199 03/10/2018    TRIG 163 03/10/2018    HDL 31 (L) 12/03/2021    LDLCALC 131 (H) 12/03/2021        Assessment & Plan   Visit Diagnoses and Associated Orders     Morbid obesity due to excess calories ([address]    -  Primary    worse, poor control; referred to bariatrics    Amb External Referral To [facility] [address], Marcos Kearns DO, Weight Management and [place], [address] [place] [address] [place] Sleep Study with PAP Titration [id number] Custom]           Type 2 diabetes mellitus without complication, without long-term current use of insulin (HCC)        improving, fair control; has sig reduction in carbs    Amb External Referral To [facility] Berenices Dean Burnhama Tracy DO, Weight Management and [place] Mery Lafleurcassia Custom]           Pure hypercholesterolemia        worse, is adherent with meds; reviewed diet and DM treatment; on Abilify - may be contributing factor; monitor    Amb External [facility] Dean Burnhama Tracy, DO, Weight Management and [place] [facility] [[id number] Custom]           PCOS (polycystic ovarian syndrome)        referred for Bariatrics    Amb [facility] [[id number] Custom]           Chronic pain of both knees        referred for Bariatrics    Amb External Referral To [facility] Beren[facility] Dean Burnhama Tracy DO, [facility] [[id number] Custom]           Gastroesophageal reflux disease without esophagitis        referred for Bariatrics    Amb External Referral To [place] [address]Deana Tracy DO, Weight Management and [facility] [[id number] Custom]           Chronic constipation        increase Amitiza to 24 mg BID and add colace; cont PEG         Essential hypertension        well-controlled; referred for Bariatrics    Amb External Referral [facility] Xu Burnhamessloly Molina DO, [facility] [employer] [facility] [employer] [facility] Custom]           JONATHAN (obstructive sleep apnea)        Referred for CPAP titration and pulm consult.     [facility] [address] Sleep Study with PAP Titration Bhakti Saldana MD, Pulmonology, [place] [[id number] Custom]           Need for influenza vaccination INFLUENZA, MDCK QUADV, 2 YRS AND OLDER, IM, PF, PREFILL SYR OR SDV, 0.5ML (FLUCELVAX QUADV, PF) [[id number] Custom]           ORDERS WITHOUT AN ASSOCIATED DIAGNOSIS    lubiprostone (AMITIZA) 24 MCG capsule [[id number]]      docusate sodium (COLACE) 100 MG capsule [[id number]]              Reviewed with the patient: all disease processes, current clinical status, medications, activities and diet.      Side effects, adverse effects of the medication prescribed today, as well as treatment plan/ rationale and result expectations have been discussed with the patient who expresses understanding and desires to proceed.     Close follow up to evaluate treatment results and for coordination of care. I have reviewed the patient's medical history in detail and updated the computerized patient record. More than 50% of the appointment was spent in face-to-face counseling, education and care coordination. Please note this report has been partially produced using speech recognition software and may contain mistakes related to that system including errors in grammar, punctuation and spelling as well as words and phrases that may seem inappropriate. If there are questions or concerns, please feel free to contact me to clarify.     Orders Placed This Encounter   Procedures    INFLUENZA, MDCK QUADV, 2 YRS AND OLDER, IM, PF, PREFILL SYR OR SDV, 0.5ML (FLUCELVAX QUADV, PF)    Amb External Referral To Bariatrics     Referral Priority:   Routine     Referral Type:   Eval and Treat     Requested Specialty:   Bariatrics     Number of Visits Requested:   Rachell 121, DO, Weight Management and [facility]     Referral Priority:   Routine     Referral Type:   Eval and Treat     Referral Reason:   Specialty Services Required     Referred to Provider:   Theta Homans, DO     Requested Specialty:   Bariatric Surgery     Number of Visits Requested:   Dasha Davis MD, Pulmonology, [place]     Referral Priority:   Routine     Referral Type:   Eval and Treat     Referral Reason:   Specialty Services Required     Referred to Provider:   Blanka Aguiar MD     Requested Specialty:   Pulmonology     Number of Visits Requested:   [facility] Sleep Study with PAP Titration     Patient has confirmed JONATHAN. Has been on CPAP. Machine broken. Obesity worsened. Needs reeval of CPAP levels. Scheduling Instructions:      Sleep Study with PAP Titration - [place]       Scheduling and Pre-Certification: [phone]      The following must be completed and FAXED to [phone]      1) Sleep Evaluation Orders Form      2) Office note justifying study      3) Demographic info / insurance card     Order Specific Question:   Sleep Study Titration Type     Answer:   CPAP     Order Specific Question:   Location For Sleep Study     Answer:   [place]     Order Specific Question:   Select Sleep Lab Location     Answer:   [facility]     Comments:   [place]     Orders Placed This Encounter   Medications    lubiprostone (AMITIZA) 24 MCG capsule     Sig: Take 1 capsule by mouth 2 times daily (with meals)     Dispense:  180 capsule     Refill:  4    docusate sodium (COLACE) 100 MG capsule     Sig: Take 1 capsule by mouth 2 times daily as needed for Constipation     Dispense:  180 capsule     Refill:  4     Medications Discontinued During This Encounter   Medication Reason    lubiprostone (AMITIZA) 24 MCG capsule      Return in about 3 months (around 4/19/2022) for f/u. Controlled Substance Monitoring:    Acute and Chronic Pain Monitoring:   RX Monitoring 5/15/2019   Attestation The Prescription Monitoring Report for this patient was reviewed today.    Periodic Controlled Substance Monitoring No signs of potential drug abuse or diversion identified: otherwise, see note documentation;Obtaining appropriate analgesic effect of treatment.;Possible medication side effects, risk of tolerance/dependence & alternative treatments discussed. Chronic Pain > 50 MEDD Obtained or confirmened \"Consent of Opioid Use\" on file.;Considered consultation with a specialist.;Re-evaluated the status of the patient's underlying condition causing pain.    Chronic Pain > 80 MEDD -           Sandra PADILLA MD

## 2022-01-27 ENCOUNTER — HOSPITAL ENCOUNTER (OUTPATIENT)
Age: 47
Setting detail: OUTPATIENT SURGERY
Discharge: HOME | End: 2022-01-27
Attending: INTERNAL MEDICINE | Admitting: INTERNAL MEDICINE
Payer: COMMERCIAL

## 2022-01-27 ENCOUNTER — ANESTHESIA EVENT (OUTPATIENT)
Dept: ENDOSCOPY | Age: 47
End: 2022-01-27
Payer: COMMERCIAL

## 2022-01-27 ENCOUNTER — ANESTHESIA (OUTPATIENT)
Dept: ENDOSCOPY | Age: 47
End: 2022-01-27
Payer: COMMERCIAL

## 2022-01-27 ENCOUNTER — ANCILLARY PROCEDURE (OUTPATIENT)
Dept: ENDOSCOPY | Age: 47
End: 2022-01-27
Attending: INTERNAL MEDICINE
Payer: COMMERCIAL

## 2022-01-27 VITALS
DIASTOLIC BLOOD PRESSURE: 87 MMHG | HEIGHT: 64 IN | RESPIRATION RATE: 20 BRPM | WEIGHT: 272 LBS | SYSTOLIC BLOOD PRESSURE: 132 MMHG | HEART RATE: 88 BPM | OXYGEN SATURATION: 96 % | BODY MASS INDEX: 46.44 KG/M2 | TEMPERATURE: 97.2 F

## 2022-01-27 VITALS
DIASTOLIC BLOOD PRESSURE: 94 MMHG | OXYGEN SATURATION: 100 % | SYSTOLIC BLOOD PRESSURE: 154 MMHG | RESPIRATION RATE: 16 BRPM

## 2022-01-27 PROCEDURE — 45378 DIAGNOSTIC COLONOSCOPY: CPT | Performed by: INTERNAL MEDICINE

## 2022-01-27 PROCEDURE — 3700000001 HC ADD 15 MINUTES (ANESTHESIA): Performed by: INTERNAL MEDICINE

## 2022-01-27 PROCEDURE — 2580000003 HC RX 258: Performed by: INTERNAL MEDICINE

## 2022-01-27 PROCEDURE — 3700000000 HC ANESTHESIA ATTENDED CARE: Performed by: INTERNAL MEDICINE

## 2022-01-27 PROCEDURE — 2580000003 HC RX 258

## 2022-01-27 PROCEDURE — 6360000002 HC RX W HCPCS: Performed by: REGISTERED NURSE

## 2022-01-27 PROCEDURE — 3609027000 HC COLONOSCOPY: Performed by: INTERNAL MEDICINE

## 2022-01-27 PROCEDURE — 7100000010 HC PHASE II RECOVERY - FIRST 15 MIN: Performed by: INTERNAL MEDICINE

## 2022-01-27 PROCEDURE — 2709999900 HC NON-CHARGEABLE SUPPLY: Performed by: INTERNAL MEDICINE

## 2022-01-27 RX ORDER — SODIUM CHLORIDE 9 MG/ML
INJECTION, SOLUTION INTRAVENOUS
Status: DISCONTINUED
Start: 2022-01-27 | End: 2022-01-27 | Stop reason: SDUPTHER

## 2022-01-27 RX ORDER — 0.9 % SODIUM CHLORIDE 0.9 %
500 INTRAVENOUS SOLUTION INTRAVENOUS ONCE
Status: COMPLETED | OUTPATIENT
Start: 2022-01-27 | End: 2022-01-27

## 2022-01-27 RX ORDER — PROPOFOL 10 MG/ML
INJECTION, EMULSION INTRAVENOUS PRN
Status: DISCONTINUED | OUTPATIENT
Start: 2022-01-27 | End: 2022-01-27 | Stop reason: SDUPTHER

## 2022-01-27 RX ORDER — MAGNESIUM HYDROXIDE 1200 MG/15ML
LIQUID ORAL PRN
Status: DISCONTINUED | OUTPATIENT
Start: 2022-01-27 | End: 2022-01-27 | Stop reason: ALTCHOICE

## 2022-01-27 RX ADMIN — SODIUM CHLORIDE 500 ML: 9 INJECTION, SOLUTION INTRAVENOUS at 10:00

## 2022-01-27 RX ADMIN — PROPOFOL 200 MG: 10 INJECTION, EMULSION INTRAVENOUS at 10:19

## 2022-01-27 RX ADMIN — Medication 500 ML: at 10:00

## 2022-01-27 RX ADMIN — PROPOFOL 200 MG: 10 INJECTION, EMULSION INTRAVENOUS at 10:11

## 2022-01-27 ASSESSMENT — PULMONARY FUNCTION TESTS
PIF_VALUE: 0
PIF_VALUE: 1
PIF_VALUE: 0
PIF_VALUE: 1
PIF_VALUE: 0
PIF_VALUE: 0
PIF_VALUE: 1
PIF_VALUE: 0

## 2022-01-27 NOTE — ANESTHESIA PRE PROCEDURE
Department of Anesthesiology  Preprocedure Note       Name:  Gadiel Zhu   Age:  40 y.o.  :  1975                                          MRN:  [id number]         Date:  2022      Surgeon: Xavier Pinto):  Pia Gandhi MD    Procedure: Procedure(s):  COLORECTAL CANCER SCREENING, HIGH RISK    Medications prior to admission:   Prior to Admission medications    Medication Sig Start Date End Date Taking?  Authorizing Provider   lubiprostone (AMITIZA) 24 MCG capsule Take 1 capsule by mouth 2 times daily (with meals) 22   Sweetie Parekh MD   docusate sodium (COLACE) 100 MG capsule Take 1 capsule by mouth 2 times daily as needed for Constipation 22   Sweetie Parekh MD   insulin lispro (HUMALOG) 100 UNIT/ML injection vial Use via insulin pump max daily dose 50 units 22   Breanne Solares MD   Accu-Chek FastClix Lancets MISC Test 4x daily 22   Breanne Solares MD   blood glucose test strips (ACCU-CHEK GUIDE) strip Test 4x daily 22   Breanne Solares MD   melatonin 3 MG TABS tablet Take 1 mg by mouth daily    Historical Provider, MD   atorvastatin (LIPITOR) 40 MG tablet Take 1 tablet by mouth daily 12/3/21   Sweetie Parekh MD   lisinopril (PRINIVIL;ZESTRIL) 20 MG tablet Take 1 tablet by mouth daily 12/3/21   Sweetie Parekh MD   fenofibrate (TRIGLIDE) 160 MG tablet Take 1 tablet by mouth daily 12/3/21   Sweetie Parekh MD   ARIPiprazole (ABILIFY) 5 MG tablet Take 1 tablet by mouth daily 12/3/21   Sweetie Parekh MD   vitamin D (ERGOCALCIFEROL) 1.25 MG (33565 UT) CAPS capsule Take 1 capsule by mouth once a week 20   Sweetie Parekh MD   aspirin 81 MG tablet Take 1 tablet by mouth daily 7/3/19   Sweetie Parekh MD   Insulin Pen Needle 32G X 4 MM MISC 1 each by Does not apply route 6 times daily  Patient not taking: Reported on 2022   Sweetie Parekh MD   Lancets MISC 1 each by Does not apply route 3 times daily DX:E11.65, IDDM (please dispense covered brand) 6/5/19   Alvaro Ruffin MD   Sodium Phosphates (FLEET) 7-19 GM/118ML Place 1 enema rectally every 2 hours as needed (constipation) 6/5/19   Alvaro Ruffin MD   Elastic Bandages & Supports (ABDOMINAL BINDER/ELASTIC LARGE) MISC Wear around abdomen or pelvis as needed for comfort. 4/3/19   Zayra Herrera,    ondansetron (ZOFRAN-ODT) 4 MG disintegrating tablet Take 1 tablet by mouth 3 times daily as needed for Nausea or Vomiting 4/1/19   KAIN Hood NP   Blood Glucose Monitoring Suppl (ONE TOUCH ULTRA 2) w/Device KIT USE AS DIRECTED TID 5/1/18   Historical Provider, MD       Current medications:    No current facility-administered medications for this encounter.      Current Outpatient Medications   Medication Sig Dispense Refill    lubiprostone (AMITIZA) 24 MCG capsule Take 1 capsule by mouth 2 times daily (with meals) 180 capsule 4    docusate sodium (COLACE) 100 MG capsule Take 1 capsule by mouth 2 times daily as needed for Constipation 180 capsule 4    insulin lispro (HUMALOG) 100 UNIT/ML injection vial Use via insulin pump max daily dose 50 units 30 mL 3    Accu-Chek FastClix Lancets MISC Test 4x daily 150 each 3    blood glucose test strips (ACCU-CHEK GUIDE) strip Test 4x daily 150 each 3    melatonin 3 MG TABS tablet Take 1 mg by mouth daily      atorvastatin (LIPITOR) 40 MG tablet Take 1 tablet by mouth daily 90 tablet 3    lisinopril (PRINIVIL;ZESTRIL) 20 MG tablet Take 1 tablet by mouth daily 90 tablet 3    fenofibrate (TRIGLIDE) 160 MG tablet Take 1 tablet by mouth daily 90 tablet 3    ARIPiprazole (ABILIFY) 5 MG tablet Take 1 tablet by mouth daily 90 tablet 1    vitamin D (ERGOCALCIFEROL) 1.25 MG (23799 UT) CAPS capsule Take 1 capsule by mouth once a week 12 capsule 3    aspirin 81 MG tablet Take 1 tablet by mouth daily 90 tablet 3    Insulin Pen Needle 32G X 4 MM MISC 1 each by Does not apply route 6 times daily (Patient not taking: Reported on 1/19/2022) 600 each 3    Lancets MISC 1 each by Does not apply route 3 times daily DX:E11.65, IDDM (please dispense covered brand) 300 each 3    Sodium Phosphates (FLEET) 7-19 GM/118ML Place 1 enema rectally every 2 hours as needed (constipation) 4 Bottle 0    Elastic Bandages & Supports (ABDOMINAL BINDER/ELASTIC LARGE) MISC Wear around abdomen or pelvis as needed for comfort. 1 each 1    ondansetron (ZOFRAN-ODT) 4 MG disintegrating tablet Take 1 tablet by mouth 3 times daily as needed for Nausea or Vomiting 21 tablet 0    Blood Glucose Monitoring Suppl (ONE TOUCH ULTRA 2) w/Device KIT USE AS DIRECTED TID  0       Allergies:     Allergies   Allergen Reactions    Niacin And Related Swelling and Rash       Problem List:    Patient Active Problem List   Diagnosis Code    Type 2 diabetes mellitus without complication, without long-term current use of insulin ([facility]) E11.9    Morbid obesity due to excess calories ([facility]) E66.01    Hyperlipidemia E78.5    PCOS (polycystic ovarian syndrome) E28.2    Sleep apnea G47.30    Hypertrophic and atrophic condition of skin L91.9, L90.9    High risk medication use Z79.899    Vitamin D deficiency E55.9    Essential hypertension I10    Fibromyalgia M79.7    Thyromegaly E01.0    Anxiety F41.9    HSV-2 (herpes simplex virus 2) infection B00.9    Chronic pain of both knees M25.561, M25.562, G89.29    Pain in both upper extremities M79.601, M79.602    Bilateral arm weakness R29.898    Dyspareunia, female N94.10    Candidal intertrigo B37.2    Bilateral carpal tunnel syndrome G56.03    Bipolar disorder, in partial remission, most recent episode mixed ([facility]) F31.77    PTSD (post-traumatic stress disorder) F43.10    Gastroesophageal reflux disease without esophagitis K21.9    Chronic constipation K59.09       Past Medical History:        Diagnosis Date    Abnormal Pap smear of cervix     Anxiety 7/10/2018    Anxiety     ASCUS with positive high risk HPV cervical 12/03/2018    Bilateral carpal tunnel syndrome 5/15/2019    Bipolar disorder, in partial remission, most recent episode mixed ([facility] [address] 12/3/2021    Bipolar disorder, in partial remission, most recent episode mixed ([address] 12/3/2021    Chronic constipation 12/3/2021    Diabetes type 2, uncontrolled ([facility] [address]     Essential hypertension 5/29/2018    Fibromyalgia 5/29/2018    Gastroesophageal reflux disease without esophagitis 12/3/2021    Gastroesophageal reflux disease without esophagitis 12/3/2021    Herpes     HPV in female 12/03/2018    HPV 18    Hyperlipidemia     Obesity     PTSD (post-traumatic stress disorder) 12/3/2021    PTSD (post-traumatic stress disorder) 12/3/2021    Reactive depression 6/5/2019    Sleep apnea     Thyroid disease        Past Surgical History:  No past surgical history on file. Social History:    Social History     Tobacco Use    Smoking status: Never Smoker    Smokeless tobacco: Never Used   Substance Use Topics    Alcohol use: No                                Counseling given: Not Answered      Vital Signs (Current): There were no vitals filed for this visit.                                            BP Readings from Last 3 Encounters:   01/19/22 138/78   01/04/22 126/84   12/03/21 120/80       NPO Status:                                                                                 BMI:   Wt Readings from Last 3 Encounters:   01/19/22 271 lb (122.9 kg)   01/04/22 265 lb (120.2 kg)   12/03/21 265 lb (120.2 kg)     There is no height or weight on file to calculate BMI.    CBC:   Lab Results   Component Value Date    WBC 7.8 12/07/2021    RBC 4.93 12/07/2021    HGB 13.4 12/07/2021    HCT 40.6 12/07/2021    MCV 82.4 12/07/2021    RDW 13.8 12/07/2021     12/07/2021       CMP:   Lab Results   Component Value Date     12/03/2021    K 4.3 12/03/2021     12/03/2021    CO2 22 12/03/2021    BUN 15 12/03/2021    CREATININE 0.67 12/03/2021    GFRAA >60.0 12/03/2021    LABGLOM >60.0 12/03/2021    GLUCOSE 188 01/04/2022    PROT 8.0 12/07/2021    CALCIUM 9.3 12/03/2021    BILITOT 0.4 12/07/2021    ALKPHOS 66 12/07/2021    AST 31 12/07/2021    ALT 57 12/07/2021       POC Tests: No results for input(s): POCGLU, POCNA, POCK, POCCL, POCBUN, POCHEMO, POCHCT in the last 72 hours. Coags: No results found for: PROTIME, INR, APTT    HCG (If Applicable):   Lab Results   Component Value Date    PREGTESTUR negative 05/30/2018        ABGs: No results found for: PHART, PO2ART, DIR2KEM, FMZ3SKM, BEART, Y8VSZFYU     Type & Screen (If Applicable):  No results found for: LABABO, LABRH    Drug/Infectious Status (If Applicable):  Lab Results   Component Value Date    HEPCAB NONREACTIVE 12/07/2021       COVID-19 Screening (If Applicable): No results found for: COVID19        Anesthesia Evaluation  Patient summary reviewed and Nursing notes reviewed no history of anesthetic complications:   Airway: Mallampati: II  TM distance: >3 FB   Neck ROM: full  Mouth opening: > = 3 FB Dental: normal exam         Pulmonary:normal exam    (+) sleep apnea:                             Cardiovascular:  Exercise tolerance: good (>4 METS),   (+) hypertension:,       ECG reviewed               Beta Blocker:  Not on Beta Blocker         Neuro/Psych:   (+) neuromuscular disease:, psychiatric history:            GI/Hepatic/Renal:   (+) GERD: no interval change,           Endo/Other:    (+) Diabetesno interval change, , .          Pt had PAT visit. Abdominal:             Vascular: negative vascular ROS. Other Findings:             Anesthesia Plan      MAC     ASA 3       Induction: intravenous.                           KAIN Cheatham - IKE   1/27/2022

## 2022-01-27 NOTE — H&P
Patient Name: Tawnya Renee  : 1975  MRN: [id number]  DATE: 22      ENDOSCOPY  History and Physical    Procedure:    [] Diagnostic Colonoscopy       [x] Screening Colonoscopy  [] EGD      [] ERCP      [] EUS       [] Other    [x] Previous office notes/History and Physical reviewed from the patients chart. Please see EMR for further details of HPI. I have examined the patient's status immediately prior to the procedure and:      Indications/HPI:    []Abdominal Pain   []Cancer- GI/Lung  []Fhx of colon CA  []History of Polyps   []Keiths   []Melena  []Abnormal Imaging   []Dysphagia    []Persistent Pneumonia  []Anemia   []Food Impaction  [x]History of Polyps  []GI Bleed   []Pulmonary nodule/Mass  []Change in bowel habits  []Heartburn/Reflux  []Rectal Bleed (BRBPR)  []Chest Pain - Non Cardiac  []Heme (+) Stool  []Ulcers  []Constipation   []Hemoptysis   []Varices  []Diarrhea   []Hypoxemia  []Nausea/Vomiting   []Screening   []Crohns/Colitis  []Other:    Anesthesia:   [x] MAC [] Moderate Sedation   [] General   [] None     ROS: 12 pt Review of Symptoms was negative unless mentioned above    Medications:   Prior to Admission medications    Medication Sig Start Date End Date Taking?  Authorizing Provider   lubiprostone (AMITIZA) 24 MCG capsule Take 1 capsule by mouth 2 times daily (with meals) 22   Floyd Leon MD   docusate sodium (COLACE) 100 MG capsule Take 1 capsule by mouth 2 times daily as needed for Constipation 22   Floyd Leon MD   insulin lispro (HUMALOG) 100 UNIT/ML injection vial Use via insulin pump max daily dose 50 units 22   Snow Pozo MD   Accu-Chek FastClix Lancets MISC Test 4x daily 22   Snow Pozo MD   blood glucose test strips (ACCU-CHEK GUIDE) strip Test 4x daily 22   Snow Pozo MD   melatonin 3 MG TABS tablet Take 1 mg by mouth daily    Historical Provider, MD   atorvastatin (LIPITOR) 40 MG tablet Take 1 tablet by mouth daily 12/3/21   Colletta Peek, MD   lisinopril (PRINIVIL;ZESTRIL) 20 MG tablet Take 1 tablet by mouth daily 12/3/21   Colletta Peek, MD   fenofibrate (TRIGLIDE) 160 MG tablet Take 1 tablet by mouth daily 12/3/21   Colletta Peek, MD   ARIPiprazole (ABILIFY) 5 MG tablet Take 1 tablet by mouth daily 12/3/21   Colletta Peek, MD   vitamin D (ERGOCALCIFEROL) 1.25 MG (48991 UT) CAPS capsule Take 1 capsule by mouth once a week 9/9/20   Colletta Peek, MD   aspirin 81 MG tablet Take 1 tablet by mouth daily 7/3/19   Colletta Peek, MD   Insulin Pen Needle 32G X 4 MM MISC 1 each by Does not apply route 6 times daily  Patient not taking: Reported on 1/19/2022 6/5/19   Colletta Peek, MD   Lancets MISC 1 each by Does not apply route 3 times daily DX:E11.65, IDDM (please dispense covered brand) 6/5/19   Colletta Peek, MD   Sodium Phosphates (FLEET) 7-19 GM/118ML Place 1 enema rectally every 2 hours as needed (constipation) 6/5/19   Colletta Peek, MD   Elastic Bandages & Supports (ABDOMINAL BINDER/ELASTIC LARGE) MISC Wear around abdomen or pelvis as needed for comfort. 4/3/19   Zayra Herrera DO   ondansetron (ZOFRAN-ODT) 4 MG disintegrating tablet Take 1 tablet by mouth 3 times daily as needed for Nausea or Vomiting 4/1/19   KAIN Georges NP   Blood Glucose Monitoring Suppl (ONE TOUCH ULTRA 2) w/Device KIT USE AS DIRECTED TID 5/1/18   Historical Provider, MD     Allergies:    Allergies   Allergen Reactions    Niacin And Related Swelling and Rash      History of allergic reaction to anesthesia:  No  Past Medical History:  Past Medical History:   Diagnosis Date    Abnormal Pap smear of cervix     Anxiety 7/10/2018    Anxiety     ASCUS with positive high risk HPV cervical 12/03/2018    Bilateral carpal tunnel syndrome 5/15/2019    Bipolar disorder, in partial remission, most recent episode mixed ([address] 12/3/2021    Bipolar disorder, in partial remission, most recent episode mixed ([facility] [address] 12/3/2021    Chronic constipation 12/3/2021    Diabetes type 2, uncontrolled ([facility] [address]     Essential hypertension 5/29/2018    Fibromyalgia 5/29/2018    Gastroesophageal reflux disease without esophagitis 12/3/2021    Gastroesophageal reflux disease without esophagitis 12/3/2021    Herpes     HPV in female 12/03/2018    HPV 18    Hyperlipidemia     Obesity     PTSD (post-traumatic stress disorder) 12/3/2021    PTSD (post-traumatic stress disorder) 12/3/2021    Reactive depression 6/5/2019    Sleep apnea     Thyroid disease      Past Surgical History:  No past surgical history on file. Social History:  Social History     Tobacco Use    Smoking status: Never Smoker    Smokeless tobacco: Never Used   Substance Use Topics    Alcohol use: No    Drug use: No     Vital Signs: There were no vitals filed for this visit. Physical Exam:  Cardiac:  [x]WNL []Comments:  Pulmonary:  [x]WNL []Comments:   Neuro/Mental Status:  [x]WNL []Comments:  Abdominal:  [x]WNL []Comments:  Other:   []WNL []Comments:    Informed Consent:  The risks and benefits of the procedure have been discussed with either the patient or if they cannot consent, their representative. Assessment:  Patient examined and appropriate for planned sedation and procedure. Plan:  Proceed with planned sedation and procedure as above. The patient was counseled at length about risks of inez COVID19 in the perioperative and any recovery window from the procedure. The patient was made aware that inez [address] may worsen their prognosis for recovery from their procedure and lend to a higher morbidity andall mortality risk. The patient was given the option of postponing the procedure all material risks, benefits, and alternatives were discussed. The patient does wish to proceed with the procedure at this time.     Sherita Vázquez MD  9:41 AM

## 2022-01-27 NOTE — ANESTHESIA POSTPROCEDURE EVALUATION
Department of Anesthesiology  Postprocedure Note    Patient: Thom Martin  MRN: [id number]  YOB: 1975  Date of evaluation: 1/27/2022  Time:  10:23 AM     Procedure Summary     Date: 01/27/22 Room / Location: [address] [place] [address]    Anesthesia Start: 1000 Anesthesia Stop: 1022    Procedure: COLORECTAL CANCER SCREENING, HIGH RISK (N/A ) Diagnosis: (History of colon polyps Z86.010)    Surgeons: Pankaj Jalloh MD Responsible Provider: KAIN Hsieh CRNA    Anesthesia Type: MAC ASA Status: 3          Anesthesia Type: MAC    Baldomero Phase I: Baldomero Score: 10    Baldomero Phase II:      Last vitals: Reviewed and per EMR flowsheets.        Anesthesia Post Evaluation    Patient location during evaluation: bedside  Patient participation: complete - patient participated  Level of consciousness: awake and awake and alert  Pain score: 0  Airway patency: patent  Nausea & Vomiting: no nausea and no vomiting  Complications: no  Cardiovascular status: blood pressure returned to baseline and hemodynamically stable  Respiratory status: acceptable  Hydration status: euvolemic

## 2022-02-14 ENCOUNTER — OFFICE VISIT (OUTPATIENT)
Dept: PHYSICAL MEDICINE AND REHAB | Age: 47
End: 2022-02-14
Payer: COMMERCIAL

## 2022-02-14 VITALS
HEIGHT: 64 IN | BODY MASS INDEX: 46.44 KG/M2 | WEIGHT: 272 LBS | SYSTOLIC BLOOD PRESSURE: 156 MMHG | DIASTOLIC BLOOD PRESSURE: 104 MMHG

## 2022-02-14 DIAGNOSIS — G56.03 BILATERAL CARPAL TUNNEL SYNDROME: Primary | ICD-10-CM

## 2022-02-14 DIAGNOSIS — M54.42 CHRONIC BILATERAL LOW BACK PAIN WITH BILATERAL SCIATICA: ICD-10-CM

## 2022-02-14 DIAGNOSIS — M25.511 CHRONIC PAIN OF BOTH SHOULDERS: ICD-10-CM

## 2022-02-14 DIAGNOSIS — Z79.899 HIGH RISK MEDICATION USE: ICD-10-CM

## 2022-02-14 DIAGNOSIS — M79.10 MYALGIA: ICD-10-CM

## 2022-02-14 DIAGNOSIS — G47.33 OBSTRUCTIVE SLEEP APNEA SYNDROME: ICD-10-CM

## 2022-02-14 DIAGNOSIS — G89.29 CHRONIC PAIN OF BOTH KNEES: ICD-10-CM

## 2022-02-14 DIAGNOSIS — M54.41 CHRONIC BILATERAL LOW BACK PAIN WITH BILATERAL SCIATICA: ICD-10-CM

## 2022-02-14 DIAGNOSIS — E55.9 VITAMIN D DEFICIENCY: Chronic | ICD-10-CM

## 2022-02-14 DIAGNOSIS — G89.29 CHRONIC BILATERAL LOW BACK PAIN WITH BILATERAL SCIATICA: ICD-10-CM

## 2022-02-14 DIAGNOSIS — M25.512 CHRONIC PAIN OF BOTH SHOULDERS: ICD-10-CM

## 2022-02-14 DIAGNOSIS — M25.562 CHRONIC PAIN OF BOTH KNEES: ICD-10-CM

## 2022-02-14 DIAGNOSIS — M25.561 CHRONIC PAIN OF BOTH KNEES: ICD-10-CM

## 2022-02-14 DIAGNOSIS — G89.29 CHRONIC PAIN OF BOTH SHOULDERS: ICD-10-CM

## 2022-02-14 DIAGNOSIS — E66.01 MORBID OBESITY DUE TO EXCESS CALORIES (HCC): ICD-10-CM

## 2022-02-14 DIAGNOSIS — F31.77 BIPOLAR DISORDER, IN PARTIAL REMISSION, MOST RECENT EPISODE MIXED (HCC): Chronic | ICD-10-CM

## 2022-02-14 PROCEDURE — 99204 OFFICE O/P NEW MOD 45 MIN: CPT | Performed by: PHYSICAL MEDICINE & REHABILITATION

## 2022-02-14 ASSESSMENT — ENCOUNTER SYMPTOMS
EYE REDNESS: 0
NAUSEA: 0
DIARRHEA: 0
COUGH: 0
BLOOD IN STOOL: 0
EYE PAIN: 0
ABDOMINAL PAIN: 0
VOMITING: 0
SORE THROAT: 0
BOWEL INCONTINENCE: 0
STRIDOR: 0
SHORTNESS OF BREATH: 1
PHOTOPHOBIA: 0
BACK PAIN: 1
CONSTIPATION: 1
WHEEZING: 0

## 2022-02-14 NOTE — PROGRESS NOTES
[address] [facility], 56 y.o. female presents today with:     Establish Care (Patient here for evaluation & treatment plan/options for chronic neck, back, and hip pain. Self-referred (previously established patient. Pain is described as getting worse. Patient has tried chiropractic care & injections.)   Back Pain (Lumbar) and Neck Pain   Hip Pain (Bilateral)      Patient returns to reestablish care. She had been doing well with her trigger point program for her back neck and hip pain however her insurance changed and she was required to switch doctors to the [facility]. She was doing well there as well now the insurance is changed back and she is required to go back to the [facility] system she is not interested in medications she wants trigger points. Back Pain  This is a chronic problem. The current episode started more than 1 year ago. The problem occurs constantly. The problem has been gradually worsening since onset. The pain is present in the sacro-iliac, lumbar spine and gluteal. The quality of the pain is described as aching. The pain is at a severity of 8/10. The pain is severe. The pain is worse during the day. The symptoms are aggravated by bending, sitting, stress, twisting and standing. Stiffness is present in the morning. Associated symptoms include weakness. Pertinent negatives include no abdominal pain, bladder incontinence, bowel incontinence, chest pain, dysuria, fever, headaches, numbness, paresis or paresthesias. Risk factors include sedentary lifestyle, lack of exercise, history of steroid use, poor posture and obesity. She has tried ice, chiropractic manipulation, heat, home exercises, analgesics, bed rest, NSAIDs and walking for the symptoms. The treatment provided mild relief. Neck Pain   Associated symptoms include weakness. Pertinent negatives include no chest pain, fever, headaches, numbness, paresis or photophobia.    Hip Pain   The incident occurred more than 1 week ago. There was no injury mechanism. The pain is at a severity of 7/10. The pain has been fluctuating since onset. Pertinent negatives include no numbness. The symptoms are aggravated by movement, palpation and weight bearing. She has tried acetaminophen, ice, non-weight bearing, NSAIDs, immobilization and rest for the symptoms. The treatment provided mild relief.        Past Medical History:   Diagnosis Date    Abnormal Pap smear of cervix     Anxiety 7/10/2018    Anxiety     ASCUS with positive high risk HPV cervical 12/03/2018    Bilateral carpal tunnel syndrome 5/15/2019    Bipolar disorder, in partial remission, most recent episode mixed ([address] 12/3/2021    Bipolar disorder, in partial remission, most recent episode mixed ([address] 12/3/2021    Chronic constipation 12/3/2021    Colon polyp     Diabetes type 2, uncontrolled ([address]     Essential hypertension 5/29/2018    Fibromyalgia 5/29/2018    Gastroesophageal reflux disease without esophagitis 12/3/2021    Gastroesophageal reflux disease without esophagitis 12/3/2021    Herpes     HPV in female 12/03/2018    HPV 18    Hyperlipidemia     Obesity     PTSD (post-traumatic stress disorder) 12/3/2021    PTSD (post-traumatic stress disorder) 12/3/2021    Reactive depression 6/5/2019    Sleep apnea     Thyroid disease      Past Surgical History:   Procedure Laterality Date    COLONOSCOPY      COLONOSCOPY N/A 1/27/2022    COLORECTAL CANCER SCREENING, HIGH RISK performed by Maira Ambriz MD at [facility] Marital status:      Spouse name: None    Number of children: None    Years of education: None    Highest education level: None   Occupational History    Occupation: Tailored   Tobacco Use    Smoking status: Never Smoker    Smokeless tobacco: Never Used   Vaping Use    Vaping Use: Never used   Substance and Sexual Activity    Alcohol use: Yes     Comment: socially    Drug use: No    Sexual activity: Yes     Partners: Male     Comment: No BC; primary infertility   Other Topics Concern    None   Social History Narrative    None     Social Determinants of Health     Financial Resource Strain: Low Risk     Difficulty of Paying Living Expenses: Not hard at all   Food Insecurity: No Food Insecurity    Worried About Running Out of Food in the Last Year: Never true    Klaudia of Food in the Last Year: Never true   Transportation Needs:     Lack of Transportation (Medical): Not on file    Lack of Transportation (Non-Medical):  Not on file   Physical Activity:     Days of Exercise per Week: Not on file    Minutes of Exercise per Session: Not on file   Stress:     Feeling of Stress : Not on file   Social Connections:     Frequency of Communication with Friends and Family: Not on file    Frequency of Social Gatherings with Friends and Family: Not on file    Attends Protestant Services: Not on file    Active Member of [address] [place] [address] or Organizations: Not on file    Attends Club or Organization Meetings: Not on file    Marital Status: Not on file   Intimate Partner Violence:     Fear of Current or Ex-Partner: Not on file    Emotionally Abused: Not on file    Physically Abused: Not on file    Sexually Abused: Not on file   Housing Stability:     Unable to Pay for Housing in the Last Year: Not on file    Number of [place] in the Last Year: Not on file    Unstable Housing in the Last Year: Not on file     Family History   Problem Relation Age of Onset    Diabetes Father     High Blood Pressure Father     Retinal Detachment Father     Cancer Father         skin    Thyroid Disease Mother     Kidney Disease Sister     Inflam Bowel Dis Brother     No Known Problems Sister     Other Brother     No Known Problems Paternal Grandfather     No Known Problems Paternal Grandmother     No Known Problems Maternal Grandmother     No Known Problems Maternal Grandfather     No Known Problems Other     Breast Cancer Maternal Aunt     Colon Cancer Neg Hx     Eclampsia Neg Hx     Hypertension Neg Hx     Ovarian Cancer Neg Hx      Labor Neg Hx     Spont Abortions Neg Hx     Stroke Neg Hx        Allergies   Allergen Reactions    Niacin And Related Swelling and Rash       Review of Systems   Constitutional: Positive for activity change and fatigue. Negative for chills, diaphoresis and fever. HENT: Negative for congestion, ear discharge, ear pain, hearing loss, nosebleeds, sore throat and tinnitus. Eyes: Negative for photophobia, pain and redness. Respiratory: Positive for shortness of breath. Negative for cough, wheezing and stridor. Shortness of breath on exertion   Cardiovascular: Negative for chest pain, palpitations and leg swelling. Gastrointestinal: Positive for constipation. Negative for abdominal pain, blood in stool, bowel incontinence, diarrhea, nausea and vomiting. Endocrine: Negative for polydipsia. Genitourinary: Negative for bladder incontinence, dysuria, flank pain, frequency, hematuria and urgency. Musculoskeletal: Positive for back pain, gait problem, myalgias and neck pain. Skin: Negative for rash. Allergic/Immunologic: Positive for immunocompromised state. Negative for environmental allergies. Neurological: Positive for weakness. Negative for dizziness, tremors, seizures, numbness, headaches and paresthesias. Hematological: Does not bruise/bleed easily. Psychiatric/Behavioral: Negative for hallucinations and suicidal ideas. The patient is not nervous/anxious. Objective    Vitals:    22 1004 22 1006   BP: (!) 156/104 (!) 156/104   Site: Left Upper Arm Left Upper Arm   Position: Sitting Sitting   Cuff Size: Large Adult Large Adult   Weight: 272 lb (123.4 kg)    Height: 5' 4\" (1.626 m)      Pain Score: EIGHT (Without medication)       Physical Exam  Vitals reviewed.    Constitutional:       General: She is not in acute distress. Appearance: She is well-developed. She is not ill-appearing, toxic-appearing or diaphoretic. Comments:         HENT:      Head: Normocephalic and atraumatic. Right Ear: Hearing normal.      Left Ear: Hearing normal.      Nose: Nose normal.      Mouth/Throat:      Mouth: No oral lesions. Dentition: Normal dentition. Pharynx: No oropharyngeal exudate. Eyes:      General: No scleral icterus. Right eye: No discharge. Left eye: No discharge. Conjunctiva/sclera: Conjunctivae normal.      Right eye: No chemosis or exudate. Left eye: No chemosis or exudate. Pupils: Pupils are equal, round, and reactive to light. Neck:      Thyroid: No thyromegaly. Vascular: No JVD. Trachea: No tracheal deviation. Cardiovascular:      Pulses: No decreased pulses. Pulmonary:      Effort: Pulmonary effort is normal. No tachypnea, bradypnea, accessory muscle usage or respiratory distress. Breath sounds: Decreased breath sounds present. No wheezing. Chest:      Chest wall: No tenderness. Abdominal:      General: Bowel sounds are normal. There is no distension. Palpations: Abdomen is soft. There is no mass. Tenderness: There is no abdominal tenderness. There is no guarding or rebound. Musculoskeletal:         General: Tenderness present. Right shoulder: Normal.      Left shoulder: Normal.      Right upper arm: Normal.      Left upper arm: Normal.      Right elbow: Normal.      Left elbow: Normal.      Right forearm: Normal.      Left forearm: Normal.      Right wrist: Normal.      Left wrist: Normal.      Right hand: Normal.      Left hand: Normal.      Cervical back: Normal range of motion and neck supple. Tenderness present. No edema or rigidity. Thoracic back: Normal.      Lumbar back: Tenderness and bony tenderness present. No swelling, edema, deformity or lacerations. Decreased range of motion.       Right hip: Normal.      Left hip: Normal.      Right upper leg: Normal.      Left upper leg: Normal.      Right knee: Normal.      Left knee: Normal.      Right lower leg: Normal.      Left lower leg: Normal.      Right ankle: Normal.      Right Achilles Tendon: Normal.      Left ankle: Normal.      Left Achilles Tendon: Normal.      Right foot: Normal.      Left foot: Normal.      Comments: Tender areas are indicated by numbered spot         Lymphadenopathy:      Cervical: No cervical adenopathy. Skin:     General: Skin is warm and dry. Coloration: Skin is not pale. Findings: No abrasion, bruising, ecchymosis, erythema, laceration, petechiae or rash. Rash is not macular, pustular or urticarial.      Nails: There is no clubbing. Neurological:      Mental Status: She is alert and oriented to person, place, and time. Cranial Nerves: No cranial nerve deficit. Sensory: Sensory deficit present. Motor: No tremor, atrophy or abnormal muscle tone. Coordination: Coordination normal.      Gait: Gait abnormal.      Deep Tendon Reflexes: Reflexes abnormal. Babinski sign absent on the right side. Babinski sign absent on the left side. Reflex Scores:       Tricep reflexes are 1+ on the right side and 1+ on the left side. Bicep reflexes are 2+ on the right side and 2+ on the left side. Brachioradialis reflexes are 2+ on the right side and 2+ on the left side. Patellar reflexes are 1+ on the right side and 1+ on the left side. Achilles reflexes are 0 on the right side and 0 on the left side. Psychiatric:         Attention and Perception: She is attentive. Mood and Affect: Mood is not anxious or depressed. Affect is not labile, blunt, angry or inappropriate. Speech: She is communicative. Speech is not rapid and pressured, delayed, slurred or tangential.         Behavior: Behavior normal. Behavior is not agitated, slowed, aggressive, withdrawn, hyperactive or combative.          Thought Content: Thought content normal. Thought content is not paranoid or delusional. Thought content does not include homicidal or suicidal ideation. Thought content does not include homicidal or suicidal plan. Cognition and Memory: Cognition is not impaired. Memory is not impaired. She does not exhibit impaired recent memory or impaired remote memory. Judgment: Judgment normal. Judgment is not impulsive or inappropriate. Ortho Exam  Neurologic Exam     Mental Status   Oriented to person, place, and time. Speech: not slurred     Cranial Nerves     CN III, IV, VI   Pupils are equal, round, and reactive to light. Gait, Coordination, and Reflexes     Reflexes   Right brachioradialis: 2+  Left brachioradialis: 2+  Right biceps: 2+  Left biceps: 2+  Right triceps: 1+  Left triceps: 1+  Right patellar: 1+  Left patellar: 1+  Right achilles: 0  Left achilles: 0          After a thorough review and discussion of the previous medical records, patient comprehensive medical, surgical, and family and social history, Review of Systems, their OARRS, their Screener and Opioid Assessment for Patients with Pain (SOAPP®-R), recent diagnostics, and symptomatic results to previous treatment, it is my impression that the patients is suffering with progressive and severe:     Diagnosis Orders   1. Bilateral carpal tunnel syndrome  [facility] [place]   2. Bipolar disorder, in partial remission, most recent episode [facility])  [facility]   3. Chronic bilateral low back pain with bilateral sciatica  [address]   4. Chronic pain of both shoulders  [address]   5. Myalgia  [place] INJECT TRIGGER POINTS, 3 OR GREATER    [place] INJECT TRIGGER POINTS, 3 OR GREATER    [facility] [place]    [place] INJECT TRIGGER POINTS, 3 OR GREATER   6. High risk medication use  Urine Drug Screen   7. Vitamin D deficiency     8. Obstructive sleep apnea syndrome     9. Morbid obesity due to excess calories ([address]     10. Chronic pain of both knees         I am also concerned by lifestyle and mood issues including:    Past Medical History:   Diagnosis Date    Abnormal Pap smear of cervix     Anxiety 7/10/2018    Anxiety     ASCUS with positive high risk HPV cervical 12/03/2018    Bilateral carpal tunnel syndrome 5/15/2019    Bipolar disorder, in partial remission, most recent episode mixed ([address] 12/3/2021    Bipolar disorder, in partial remission, most recent episode mixed ([address] 12/3/2021    Chronic constipation 12/3/2021    Colon polyp     Diabetes type 2, uncontrolled ([address]     Essential hypertension 5/29/2018    Fibromyalgia 5/29/2018    Gastroesophageal reflux disease without esophagitis 12/3/2021    Gastroesophageal reflux disease without esophagitis 12/3/2021    Herpes     HPV in female 12/03/2018    HPV 18    Hyperlipidemia     Obesity     PTSD (post-traumatic stress disorder) 12/3/2021    PTSD (post-traumatic stress disorder) 12/3/2021    Reactive depression 6/5/2019    Sleep apnea     Thyroid disease            Given their medication, chronic pain and lifestyle and medications they are at risk for :    Falls, constipation, addiction, toxicity on opiates  Loss of livelyhood due to severe pain, debility, weight gain and  vitamin D deficiency    The patient was educated regarding proper diet, fitness routine, and regulatory restrictions concerning pain medications. Previous notes, comprehensive past medical, surgical, family history, and diagnostics were reviewed. Patient education and councelling were provided regarding off label use,treatment options and medication and injection risks.     Current and old OARRS ([place] Automated Prescription Reporting System) records reviewed, all refills reviewed since last visit,  Behavioral agreement/RAMON regulations   and Toxicology screen was reviewed with patient and is up to date. There are   no current red flags. high risk meds review re intensive monitoring for toxicity and addiction,  They are making good progress regarding pain relief, they are performing at a functional level regarding activities of daily living, family interactions and psychological functioning, they're not having any adverse effects or side effects from the current medications, and I see no findings of aberrant drug taking or addiction related behaviors. The patient is aware that they have a chronic pain condition and they may require opiates dosing for life. All efforts will be made to wean to the lowest effective dose. Other therapies for pain have not been effective including nonopiate medications. Injections and exercises are only partially effective. A Rx for Narcan was offered to help prevent accidental overdose. RX Monitoring 5/15/2019   Attestation The Prescription Monitoring Report for this patient was reviewed today. Periodic Controlled Substance Monitoring No signs of potential drug abuse or diversion identified: otherwise, see note documentation;Obtaining appropriate analgesic effect of treatment.;Possible medication side effects, risk of tolerance/dependence & alternative treatments discussed. Chronic Pain > 50 MEDD Obtained or confirmened \"Consent of Opioid Use\" on file.;Considered consultation with a specialist.;Re-evaluated the status of the patient's underlying condition causing pain. Chronic Pain > 80 MEDD -               Patient is currently taking:       I am having Leticia Doe. Marti Confer \"Jair Maxwell\" maintain her ONE TOUCH ULTRA 2, ondansetron, Abdominal Binder/Elastic Large, Insulin Pen Needle, Lancets, fleet, aspirin, vitamin D, melatonin, atorvastatin, lisinopril, fenofibrate, ARIPiprazole, insulin lispro, Accu-Chek FastClix Lancets, Accu-Chek Guide, lubiprostone, and docusate sodium.               I also recommend the following Medications:    No orders of the defined types were placed in this encounter. She is to take over-the-counter Tylenol 3 a day and vitamin D     -which helps with pain and function. Otherwise, continue the current pain medications that I have prescibed. Radiologic:   Old  unique films results reviewed   CT HEAD WO CONTRAST       CLINICAL HISTORY:  headache        Comparison: None       TECHNIQUE: Multiple unenhanced serial axial images of the brain from the vertex of the skull to the base of the skull were performed.       FINDINGS: The ventricles are dilated.  This is compensatory to the surrounding moderate generalized parenchymal volume loss.  No mass.  No midline shift.  The cisterns are patent. No acute intra-axial or extra-axial findings       The visualized osseous structures are unremarkable.       The visualized portion of the paranasal sinuses, and mastoids are    unremarkable.       IMPRESSION       NO ACUTE INTRA-AXIAL OR EXTRA-AXIAL FINDINGS. CT ABDOMEN PELVIS W IV CONTRAST       Indication:   left sided pain        Technique: Multiple serial axial images was performed through the abdomen and pelvis utilizing 100cc of Optiray 370.   Images were reconstructed in the axial and coronal and sagittal planes.       Comparison: January 25, 2014       Findings:       The visualized basal lungs show no focal parenchymal abnormalities.        The liver again shows a coarse calcification the dome of the right lobe of the liver., gallbladder, spleen, pancreas,   are unremarkable.       The right adrenals unremarkable. Again note is made of a small left adrenal nodule. Unchanged. Probable adenoma.       The right kidney is unremarkable. There is an area of low attenuation too small to characterize in the inferior pole left kidney. Unchanged.    No bladder calculi       Large and small bowel show no sign of obstruction.  The appendix is not visualized.  No pericecal stranding.  No diverticulitis.     NORHYDU, HYDROMO, BUPREN, NORBUPRNOR, FENTA, NORFENT, MEPERIDINE, TAPENU, TAPOSULFUR, METHADONE, LABPROP, TRAM, AMPH, METHAMP, MDMA, ECMDA    Lab Results   Component Value Date    LABCREA 226.8 12/03/2021         , I discussed results with patient. See follow-up plans for new studies. Therapies:  HEP-gentle stretching and relaxation techniques-demonstrated with patient-they are to do them twice a day. They are also advised to make the following lifestyle changes:  Goals       < 200      SOAPP-R      4/3/19 score: 9 - moderate risk  2/14/22 score: 7- low risk            Injections or Epidurals:  Injection options were discussed. Patient gave verbal consent to ordered injections. See follow-up plans for planned injections. Supplements:  Vitamin D with increased dosing during the rainy months,   Education was given on:   Dietary and Fitness--daily stretches and low carb diet-in chair Yoga when possible             Follow up with Primary Care Physician regarding their general medical needs. Stressed the importance of following up with PCP and specialists for his/her chronic diseases, health, CV, and cancer screening and continued care. Will follow disease activity/progression and adjust therapeutic regimen to disease activity and severity. Discussed medication dosage, usage, goals of therapy, and side effects. Available test results were reviewed -Discussed findings, impression and plan with patient. An additional 20 minutes were spent outside of the patient visit to review records. Additional time spent with the patient to discuss their questions. Additional time spent with the patient devoted to discussing treatment strategy, planning, and implementation. Patient understands above plan; questions asked and answered. Patient agrees to plan as noted above. At least 50% of the visit was involved in the discussion of the options for treatment.  We discussed exercises, medication, interventional therapies and surgery. Healthy life style is essential with patient hard work to achieve the wellness. In addition; discussion with the patient and/or family about patient's functional status any of the diagnostic results, impressions and/or recommended diagnostic studies, prognosis, risks and benefits of treatment options, instructions for treatment and/or follow-up, importance of compliance with chosen treatment options, risk-factor reduction, and patient/family education. They are to follow up in 2 1/2 -3 months to review medication, efficacy of injections, pill counts, OARRS check, high risk med review re intensive monitoring for toxicity and addiction, SOAPPR assessment, review diagnostics, to review previous and future treatment plans and assess appropriateness for continued therapy.         New Diagnostics  Orders Placed This Encounter   Procedures    Urine Drug Screen    [facility] [place]    [place] INJECT TRIGGER POINTS, 3 OR GREATER    [place] INJECT TRIGGER POINTS, 3 OR GREATER    [place] INJECT TRIGGER POINTS, 3 OR GREATER       Zayra Herrera, DO

## 2022-02-16 ENCOUNTER — HOSPITAL ENCOUNTER (OUTPATIENT)
Dept: SLEEP CENTER | Age: 47
Discharge: HOME | End: 2022-02-18
Payer: COMMERCIAL

## 2022-02-16 PROCEDURE — 95810 POLYSOM 6/> YRS 4/> PARAM: CPT

## 2022-02-18 PROCEDURE — 95810 POLYSOM 6/> YRS 4/> PARAM: CPT | Performed by: INTERNAL MEDICINE

## 2022-03-02 ENCOUNTER — OFFICE VISIT (OUTPATIENT)
Dept: PULMONOLOGY | Age: 47
End: 2022-03-02

## 2022-03-02 VITALS
TEMPERATURE: 97.7 F | DIASTOLIC BLOOD PRESSURE: 72 MMHG | OXYGEN SATURATION: 98 % | HEIGHT: 64 IN | HEART RATE: 78 BPM | SYSTOLIC BLOOD PRESSURE: 138 MMHG | BODY MASS INDEX: 47.29 KG/M2 | WEIGHT: 277 LBS

## 2022-03-02 DIAGNOSIS — R06.02 SOB (SHORTNESS OF BREATH): Primary | ICD-10-CM

## 2022-03-02 DIAGNOSIS — E66.9 OBESITY, UNSPECIFIED CLASSIFICATION, UNSPECIFIED OBESITY TYPE, UNSPECIFIED WHETHER SERIOUS COMORBIDITY PRESENT: ICD-10-CM

## 2022-03-02 DIAGNOSIS — G47.33 OSA (OBSTRUCTIVE SLEEP APNEA): ICD-10-CM

## 2022-03-02 PROCEDURE — 99203 OFFICE O/P NEW LOW 30 MIN: CPT | Performed by: INTERNAL MEDICINE

## 2022-03-02 NOTE — PROGRESS NOTES
NEW PATIENT VISIT-PULMONARY/SLEEP    3/2/2022     REFERRING PHYSICIAN:  Tresa Ramirez MD     REASON FOR REFERRAL:  JONATHAN    HPI:     Van Dennis is a 56 y.o. female who was referred to pulmonary clinic for evaluation. She has a history of obstructive sleep apnea that was diagnosed more than 5 years ago. He used to be on CPAP which improved in 2018. At that time, her insurance denied and denied any new sleep study. She has not been using machine for years now. She had a recent sleep study which showed no significant respiratory events. Sleep efficiency was 65%. Total apnea-hypopnea index was 3. She had minimal desaturations at night. She tells me she had lost some weight over the years. She is currently 277 pounds and she used to weight 200 pounds. She has shortness of breath with activities. Denies any cough or wheezing. She describes chest tightness with activities and when walking in the [place]. She has been on inhalers in the past which helped. She used inhalers when she was diagnosed with COVID in 2019.              Past Medical History   Past Medical History:   Diagnosis Date    Abnormal Pap smear of cervix     Anxiety 7/10/2018    Anxiety     ASCUS with positive high risk HPV cervical 12/03/2018    Bilateral carpal tunnel syndrome 5/15/2019    Bipolar disorder, in partial remission, most recent episode mixed ([address] 12/3/2021    Bipolar disorder, in partial remission, most recent episode mixed ([address] 12/3/2021    Chronic constipation 12/3/2021    Colon polyp     Diabetes type 2, uncontrolled ([address]     Essential hypertension 5/29/2018    Fibromyalgia 5/29/2018    Gastroesophageal reflux disease without esophagitis 12/3/2021    Gastroesophageal reflux disease without esophagitis 12/3/2021    Herpes     HPV in female 12/03/2018    HPV 18    Hyperlipidemia     Obesity     PTSD (post-traumatic stress disorder) 12/3/2021    PTSD (post-traumatic stress disorder) 12/3/2021    Reactive depression 6/5/2019    Sleep apnea     Thyroid disease        Past Surgical History  Past Surgical History:   Procedure Laterality Date    COLONOSCOPY      COLONOSCOPY N/A 1/27/2022    COLORECTAL CANCER SCREENING, HIGH RISK performed by Kahlil Duenas MD at [facility]       Allergies  Allergies   Allergen Reactions    Niacin And Related Swelling and Rash       Medications  Current Outpatient Medications   Medication Sig Dispense Refill    metFORMIN (GLUCOPHAGE) 1000 MG tablet TAKE 1 TABLET BY MOUTH TWICE DAILY WITH MEALS 180 tablet 3    lubiprostone (AMITIZA) 24 MCG capsule Take 1 capsule by mouth 2 times daily (with meals) 180 capsule 4    docusate sodium (COLACE) 100 MG capsule Take 1 capsule by mouth 2 times daily as needed for Constipation 180 capsule 4    insulin lispro (HUMALOG) 100 UNIT/ML injection vial Use via insulin pump max daily dose 50 units 30 mL 3    Accu-Chek FastClix Lancets MISC Test 4x daily 150 each 3    blood glucose test strips (ACCU-CHEK GUIDE) strip Test 4x daily 150 each 3    melatonin 3 MG TABS tablet Take 1 mg by mouth daily      atorvastatin (LIPITOR) 40 MG tablet Take 1 tablet by mouth daily 90 tablet 3    lisinopril (PRINIVIL;ZESTRIL) 20 MG tablet Take 1 tablet by mouth daily 90 tablet 3    fenofibrate (TRIGLIDE) 160 MG tablet Take 1 tablet by mouth daily 90 tablet 3    ARIPiprazole (ABILIFY) 5 MG tablet Take 1 tablet by mouth daily 90 tablet 1    vitamin D (ERGOCALCIFEROL) 1.25 MG (07797 UT) CAPS capsule Take 1 capsule by mouth once a week 12 capsule 3    aspirin 81 MG tablet Take 1 tablet by mouth daily 90 tablet 3    Insulin Pen Needle 32G X 4 MM MISC 1 each by Does not apply route 6 times daily 600 each 3    Lancets MISC 1 each by Does not apply route 3 times daily DX:E11.65, IDDM (please dispense covered brand) 300 each 3    Sodium Phosphates (FLEET) 7-19 GM/118ML Place 1 enema rectally every 2 hours as needed (constipation) 4 Bottle 0    Elastic Bandages & Supports (ABDOMINAL BINDER/ELASTIC LARGE) MISC Wear around abdomen or pelvis as needed for comfort. 1 each 1    ondansetron (ZOFRAN-ODT) 4 MG disintegrating tablet Take 1 tablet by mouth 3 times daily as needed for Nausea or Vomiting 21 tablet 0    Blood Glucose Monitoring Suppl (ONE TOUCH ULTRA 2) w/Device KIT USE AS DIRECTED TID  0     No current facility-administered medications for this visit. Social History  Social History     Tobacco Use    Smoking status: Never Smoker    Smokeless tobacco: Never Used   Substance Use Topics    Alcohol use: Yes     Comment: socially       Family History  Family History   Problem Relation Age of Onset    Diabetes Father     High Blood Pressure Father     Retinal Detachment Father     Cancer Father         skin    Thyroid Disease Mother     Kidney Disease Sister     Inflam Bowel Dis Brother     No Known Problems Sister     Other Brother     No Known Problems Paternal Grandfather     No Known Problems Paternal Grandmother     No Known Problems Maternal Grandmother     No Known Problems Maternal Grandfather     No Known Problems Other     Breast Cancer Maternal Aunt     Colon Cancer Neg Hx     Eclampsia Neg Hx     Hypertension Neg Hx     Ovarian Cancer Neg Hx      Labor Neg Hx     Spont Abortions Neg Hx     Stroke Neg Hx        Review of Systems  All review of systems has been obtained and negative other than what was mentionedin HPI. Physical Exam                 Vitals:    22 1034   BP: 138/72   Pulse: 78   Temp: 97.7 °F (36.5 °C)   TempSrc: Tympanic   SpO2: 98%   Weight: 277 lb (125.6 kg)   Height: 5' 4\" (1.626 m)          General appearance: Well appearing. No acute distress. AAOX3  Head: Normocephalic, without obvious abnormality, atraumatic   Eyes:Pupils bilateral equal and reactive, EOM intact.  Normal sclera and conjunctiva   Nose: Mucosa pink  Throat: Clear,  [place] 3  Neck:

## 2022-03-04 ENCOUNTER — TELEPHONE (OUTPATIENT)
Dept: BARIATRICS/WEIGHT MGMT | Age: 47
End: 2022-03-04

## 2022-03-04 NOTE — TELEPHONE ENCOUNTER
Attended Surgical Info Session on 2/23/22  With Dr. Jaylyn Schmidt   with  [facility]    Patient informed the following: This is NOT a guarantee of payment  When stating that you have a Benefit or Coverage for Bariatric Surgery - that means that you may qualify for the surgery  Bariatric Surgery is considered an elective procedure, patient is responsible to know their benefits . Any information we obtain when calling your insurance  is not  a guarantee of  coverage  and/or  benefit. Appointment Note :   New Patient , [facility],   6   month visits,  PG Fee $200,  Advise to bring completed new    patient  packet. Remind  Patient of  $200  Program fee with  $ 100  Required at  Second visit with office on initial dietician visit. Remind Patient they must be nicotine free. They will be tested at the beginning of the program and prior to surgery. Advise  Patient  Responsible for out of pocket, copay at medical visits,  Deductible and coinsurance applied to medical visits and procedure. You will be responsible for any of the following:  · Copays   · Deductibles 1500.00  · Co insurances 4000.00    The items mentioned above are  indicated or required by your insurance plan. Your deductible and coinsurance are applied to medical visits and procedures.      Verified with patient if he or she has had any previous bariatric surgery? no  ( If yes ,advise patient of transfer of care process and program fee)

## 2022-03-18 NOTE — TELEPHONE ENCOUNTER
Once her insurance has changed we can check benefits.  Unfortunately Patricia Sanchez will not be in the system until the first of each month

## 2022-03-18 NOTE — TELEPHONE ENCOUNTER
Patient called and stated that her insurance is changing as of 4/1/2022. James Mercedez ID# [id number]. Would like to have insurance verification for new insurance coverage.     Thank you

## 2022-04-06 ENCOUNTER — OFFICE VISIT (OUTPATIENT)
Dept: FAMILY MEDICINE CLINIC | Age: 47
End: 2022-04-06
Payer: MEDICAID

## 2022-04-06 ENCOUNTER — HOSPITAL ENCOUNTER (OUTPATIENT)
Dept: OCCUPATIONAL THERAPY | Age: 47
Setting detail: THERAPIES SERIES
Discharge: HOME | End: 2022-04-06

## 2022-04-06 VITALS
HEART RATE: 87 BPM | DIASTOLIC BLOOD PRESSURE: 84 MMHG | WEIGHT: 272 LBS | BODY MASS INDEX: 46.44 KG/M2 | HEIGHT: 64 IN | OXYGEN SATURATION: 99 % | SYSTOLIC BLOOD PRESSURE: 128 MMHG | TEMPERATURE: 96.8 F

## 2022-04-06 DIAGNOSIS — G47.33 OSA (OBSTRUCTIVE SLEEP APNEA): Primary | ICD-10-CM

## 2022-04-06 DIAGNOSIS — Z11.52 ENCOUNTER FOR SCREENING FOR COVID-19: ICD-10-CM

## 2022-04-06 DIAGNOSIS — Z11.52 ENCOUNTER FOR SCREENING FOR COVID-19: Primary | ICD-10-CM

## 2022-04-06 PROCEDURE — 99212 OFFICE O/P EST SF 10 MIN: CPT | Performed by: PHYSICIAN ASSISTANT

## 2022-04-06 PROCEDURE — G8417 CALC BMI ABV UP PARAM F/U: HCPCS | Performed by: PHYSICIAN ASSISTANT

## 2022-04-06 PROCEDURE — 1036F TOBACCO NON-USER: CPT | Performed by: PHYSICIAN ASSISTANT

## 2022-04-06 PROCEDURE — G8427 DOCREV CUR MEDS BY ELIG CLIN: HCPCS | Performed by: PHYSICIAN ASSISTANT

## 2022-04-06 ASSESSMENT — ENCOUNTER SYMPTOMS
COUGH: 0
BACK PAIN: 0
NAUSEA: 0
VOMITING: 0
SINUS PRESSURE: 0
ABDOMINAL PAIN: 0
SHORTNESS OF BREATH: 0
SINUS PAIN: 0
CHEST TIGHTNESS: 0
SORE THROAT: 0
DIARRHEA: 0

## 2022-04-06 ASSESSMENT — VISUAL ACUITY: OU: 1

## 2022-04-06 NOTE — PROGRESS NOTES
Therapy                                     No-show Note    Date: 2022  Patient Name: Anusha Gramajo    : 1975  (56 y.o.)     MRN: [id number]    Account #: [de-identified]            Comments:  No show, no call for scheduled evaluation . Pt was very specific for day and time she wanted to schedule , and this was provided.  Pt then is no show no call    For today's appointment patient:  []  Cancelled  []  Rescheduled appointment  [x]  No-show   []  Called pt to remind of next appointment          Signature: JARED Calderon 2022 3:57 PM

## 2022-04-06 NOTE — PROGRESS NOTES
[address] Encounter  CHIEF COMPLAINT       Chief Complaint   Patient presents with    Covid Testing     for procedure       HISTORY OF PRESENT ILLNESS   Tarsha Field is a 56 y.o. female who presents with:  HPI   Patient is presenting today with CC of COVID-19 testing for procedure. Patient is having respiratory procedure. Was not told when to come in for COVID-19 testing. No fevers, chills, nausea, or vomiting. REVIEW OF SYSTEMS     Review of Systems   Constitutional: Negative for activity change, appetite change, chills and fever. HENT: Negative for congestion, drooling, sinus pressure, sinus pain and sore throat. Eyes: Negative for visual disturbance. Respiratory: Negative for cough, chest tightness and shortness of breath. Cardiovascular: Negative for chest pain. Gastrointestinal: Negative for abdominal pain, diarrhea, nausea and vomiting. Endocrine: Negative for cold intolerance. Genitourinary: Negative for dysuria, flank pain, frequency and hematuria. Musculoskeletal: Negative for arthralgias and back pain. Skin: Negative for rash. Allergic/Immunologic: Negative for food allergies. Neurological: Negative for weakness, light-headedness, numbness and headaches. Hematological: Does not bruise/bleed easily.      PAST MEDICAL HISTORY         Diagnosis Date    Abnormal Pap smear of cervix     Anxiety 7/10/2018    Anxiety     ASCUS with positive high risk HPV cervical 12/03/2018    Bilateral carpal tunnel syndrome 5/15/2019    Bipolar disorder, in partial remission, most recent episode mixed ([address] 12/3/2021    Bipolar disorder, in partial remission, most recent episode mixed ([address] 12/3/2021    Chronic constipation 12/3/2021    Colon polyp     Diabetes type 2, uncontrolled ([address]     Essential hypertension 5/29/2018    Fibromyalgia 5/29/2018    Gastroesophageal reflux disease without esophagitis 12/3/2021    Gastroesophageal reflux disease without esophagitis 12/3/2021    Herpes     HPV in female 12/03/2018    HPV 18    Hyperlipidemia     Obesity     PTSD (post-traumatic stress disorder) 12/3/2021    PTSD (post-traumatic stress disorder) 12/3/2021    Reactive depression 6/5/2019    Sleep apnea     Thyroid disease      SURGICAL HISTORY     Patient  has a past surgical history that includes Colonoscopy and Colonoscopy (N/A, 1/27/2022). CURRENT MEDICATIONS       Previous Medications    ACCU-CHEK FASTCLIX LANCETS MISC    Test 4x daily    ARIPIPRAZOLE (ABILIFY) 5 MG TABLET    Take 1 tablet by mouth daily    ASPIRIN 81 MG TABLET    Take 1 tablet by mouth daily    ATORVASTATIN (LIPITOR) 40 MG TABLET    Take 1 tablet by mouth daily    BLOOD GLUCOSE MONITORING SUPPL (ONE TOUCH ULTRA 2) W/DEVICE KIT    USE AS DIRECTED TID    BLOOD GLUCOSE TEST STRIPS (ACCU-CHEK GUIDE) STRIP    Test 4x daily    DOCUSATE SODIUM (COLACE) 100 MG CAPSULE    Take 1 capsule by mouth 2 times daily as needed for Constipation    ELASTIC BANDAGES & SUPPORTS (ABDOMINAL BINDER/ELASTIC LARGE) MISC    Wear around abdomen or pelvis as needed for comfort.     FENOFIBRATE (TRIGLIDE) 160 MG TABLET    Take 1 tablet by mouth daily    INSULIN LISPRO (HUMALOG) 100 UNIT/ML INJECTION VIAL    Use via insulin pump max daily dose 50 units    INSULIN PEN NEEDLE 32G X 4 MM MISC    1 each by Does not apply route 6 times daily    LANCETS MISC    1 each by Does not apply route 3 times daily DX:E11.65, IDDM (please dispense covered brand)    LISINOPRIL (PRINIVIL;ZESTRIL) 20 MG TABLET    Take 1 tablet by mouth daily    LUBIPROSTONE (AMITIZA) 24 MCG CAPSULE    Take 1 capsule by mouth 2 times daily (with meals)    MELATONIN 3 MG TABS TABLET    Take 1 mg by mouth daily    METFORMIN (GLUCOPHAGE) 1000 MG TABLET    TAKE 1 TABLET BY MOUTH TWICE DAILY WITH MEALS    ONDANSETRON (ZOFRAN-ODT) 4 MG DISINTEGRATING TABLET    Take 1 tablet by mouth 3 times daily as needed for Nausea or Vomiting SODIUM PHOSPHATES (FLEET) 7-19 GM/118ML    Place 1 enema rectally every 2 hours as needed (constipation)    VITAMIN D (ERGOCALCIFEROL) 1.25 MG (77331 UT) CAPS CAPSULE    Take 1 capsule by mouth once a week     ALLERGIES     Patient is is allergic to niacin and related. FAMILY HISTORY     Patient'sfamily history includes Breast Cancer in her maternal aunt; Cancer in her father; Diabetes in her father; High Blood Pressure in her father; Inflam Bowel Dis in her brother; Kidney Disease in her sister; No Known Problems in her maternal grandfather, maternal grandmother, paternal grandfather, paternal grandmother, sister, and another family member; Other in her brother; Retinal Detachment in her father; Thyroid Disease in her mother. SOCIAL HISTORY     Patient  reports that she has never smoked. She has never used smokeless tobacco. She reports current alcohol use. She reports that she does not use drugs. PHYSICAL EXAM     VITALS  BP: 128/84, Temp: 96.8 °F (36 °C), Pulse: 87,  , SpO2: 99 %  Physical Exam  Vitals and nursing note reviewed. Constitutional:       General: She is awake. She is not in acute distress. Appearance: Normal appearance. She is well-developed. She is not ill-appearing, toxic-appearing or diaphoretic. HENT:      Head: Normocephalic and atraumatic. Right Ear: Hearing and external ear normal.      Left Ear: Hearing and external ear normal.      Nose: Nose normal.   Eyes:      General: Lids are normal. Vision grossly intact. Gaze aligned appropriately. Conjunctiva/sclera: Conjunctivae normal.   Cardiovascular:      Rate and Rhythm: Normal rate and regular rhythm. Pulses: Normal pulses. Heart sounds: Normal heart sounds, S1 normal and S2 normal.   Pulmonary:      Effort: Pulmonary effort is normal.      Breath sounds: Normal breath sounds and air entry. Musculoskeletal:      Cervical back: Normal range of motion. Skin:     General: Skin is warm.       Capillary Refill: Capillary refill takes less than 2 seconds. Neurological:      Mental Status: She is alert and oriented to person, place, and time. Gait: Gait is intact. Psychiatric:         Attention and Perception: Attention normal.         Mood and Affect: Mood normal.         Speech: Speech normal.         Behavior: Behavior normal. Behavior is cooperative. READY CARE COURSE   Labs:  No results found for this visit on 04/06/22. IMAGING:  No orders to display     Scheduled Meds:  Continuous Infusions:  PRN Meds:. PROCEDURES:  FINAL IMPRESSION      1. Encounter for screening for COVID-19      DISPOSITION/PLAN   1., Will call for results. Patient is having negative COVID-19 symptoms. On this date 4/6/2022 I have spent 15 minutes reviewing previous notes, test results and face to face with the patient discussing the diagnosis and importance of compliance with the treatment plan as well as documenting on the day of the visit. PATIENT REFERRED TO:  No follow-ups on file. DISCHARGE MEDICATIONS:  New Prescriptions    No medications on file     Cannot display discharge medications since this is not an admission.        Zane Filter, [place]

## 2022-04-06 NOTE — RESULT ENCOUNTER NOTE
I reviewed your sleep study with a sleep specialist.  Your results are inconclusive. I am going to refer you to the sleep specialist for additional evaluation. You will be getting a call from Jessica Oquendo to schedule that appointment.

## 2022-04-07 DIAGNOSIS — E11.9 TYPE 2 DIABETES MELLITUS WITHOUT COMPLICATION, WITHOUT LONG-TERM CURRENT USE OF INSULIN (HCC): ICD-10-CM

## 2022-04-07 DIAGNOSIS — Z79.899 HIGH RISK MEDICATION USE: ICD-10-CM

## 2022-04-07 LAB
AMPHETAMINE SCREEN, URINE: NORMAL
ANION GAP SERPL CALCULATED.3IONS-SCNC: 11 MEQ/L (ref 9–15)
BARBITURATE SCREEN URINE: NORMAL
BENZODIAZEPINE SCREEN, URINE: NORMAL
BUN BLDV-MCNC: 17 MG/DL (ref 6–20)
CALCIUM SERPL-MCNC: 9.4 MG/DL (ref 8.5–9.9)
CANNABINOID SCREEN URINE: NORMAL
CHLORIDE BLD-SCNC: 102 MEQ/L (ref 95–107)
CO2: 22 MEQ/L (ref 20–31)
COCAINE METABOLITE SCREEN URINE: NORMAL
CREAT SERPL-MCNC: 0.72 MG/DL (ref 0.5–0.9)
GFR AFRICAN AMERICAN: >60
GFR NON-AFRICAN AMERICAN: >60
GLUCOSE BLD-MCNC: 264 MG/DL (ref 70–99)
HBA1C MFR BLD: 9.8 % (ref 4.8–5.9)
Lab: NORMAL
METHADONE SCREEN, URINE: NORMAL
OPIATE SCREEN URINE: NORMAL
OXYCODONE URINE: NORMAL
PHENCYCLIDINE SCREEN URINE: NORMAL
POTASSIUM SERPL-SCNC: 4.3 MEQ/L (ref 3.4–4.9)
PROPOXYPHENE SCREEN: NORMAL
SODIUM BLD-SCNC: 135 MEQ/L (ref 135–144)

## 2022-04-08 ENCOUNTER — HOSPITAL ENCOUNTER (OUTPATIENT)
Dept: PULMONOLOGY | Age: 47
Discharge: HOME | End: 2022-04-08
Payer: MEDICAID

## 2022-04-08 DIAGNOSIS — R06.02 SOB (SHORTNESS OF BREATH): ICD-10-CM

## 2022-04-08 LAB
SARS-COV-2: NOT DETECTED
SOURCE: NORMAL

## 2022-04-08 PROCEDURE — 94729 DIFFUSING CAPACITY: CPT

## 2022-04-08 PROCEDURE — 94726 PLETHYSMOGRAPHY LUNG VOLUMES: CPT

## 2022-04-08 PROCEDURE — 94060 EVALUATION OF WHEEZING: CPT

## 2022-04-08 PROCEDURE — 6360000002 HC RX W HCPCS: Performed by: INTERNAL MEDICINE

## 2022-04-08 RX ORDER — ALBUTEROL SULFATE 2.5 MG/3ML
2.5 SOLUTION RESPIRATORY (INHALATION) ONCE
Status: COMPLETED | OUTPATIENT
Start: 2022-04-08 | End: 2022-04-08

## 2022-04-08 RX ADMIN — ALBUTEROL SULFATE 2.5 MG: 2.5 SOLUTION RESPIRATORY (INHALATION) at 08:03

## 2022-04-11 ENCOUNTER — OFFICE VISIT (OUTPATIENT)
Dept: ENDOCRINOLOGY | Age: 47
End: 2022-04-11
Payer: MEDICAID

## 2022-04-11 VITALS
BODY MASS INDEX: 45.24 KG/M2 | WEIGHT: 265 LBS | HEIGHT: 64 IN | HEART RATE: 90 BPM | SYSTOLIC BLOOD PRESSURE: 126 MMHG | DIASTOLIC BLOOD PRESSURE: 82 MMHG | OXYGEN SATURATION: 95 %

## 2022-04-11 DIAGNOSIS — E11.9 TYPE 2 DIABETES MELLITUS WITHOUT COMPLICATION, WITHOUT LONG-TERM CURRENT USE OF INSULIN (HCC): Primary | ICD-10-CM

## 2022-04-11 DIAGNOSIS — E66.01 MORBID OBESITY (HCC): ICD-10-CM

## 2022-04-11 DIAGNOSIS — E03.9 HYPOTHYROIDISM, UNSPECIFIED TYPE: ICD-10-CM

## 2022-04-11 LAB
CHP ED QC CHECK: NORMAL
GLUCOSE BLD-MCNC: 308 MG/DL

## 2022-04-11 PROCEDURE — 1036F TOBACCO NON-USER: CPT | Performed by: INTERNAL MEDICINE

## 2022-04-11 PROCEDURE — G8427 DOCREV CUR MEDS BY ELIG CLIN: HCPCS | Performed by: INTERNAL MEDICINE

## 2022-04-11 PROCEDURE — 2022F DILAT RTA XM EVC RTNOPTHY: CPT | Performed by: INTERNAL MEDICINE

## 2022-04-11 PROCEDURE — 3046F HEMOGLOBIN A1C LEVEL >9.0%: CPT | Performed by: INTERNAL MEDICINE

## 2022-04-11 PROCEDURE — 99213 OFFICE O/P EST LOW 20 MIN: CPT | Performed by: INTERNAL MEDICINE

## 2022-04-11 PROCEDURE — 82962 GLUCOSE BLOOD TEST: CPT | Performed by: INTERNAL MEDICINE

## 2022-04-11 PROCEDURE — G8417 CALC BMI ABV UP PARAM F/U: HCPCS | Performed by: INTERNAL MEDICINE

## 2022-04-11 ASSESSMENT — ENCOUNTER SYMPTOMS
EYES NEGATIVE: 1
EYES NEGATIVE: 1

## 2022-04-11 NOTE — PROGRESS NOTES
4/11/2022    Assessment:       Diagnosis Orders   1. Type 2 diabetes mellitus without complication, without long-term current use of insulin ([facility])  POCT Glucose    Basic Metabolic Panel    Hemoglobin A1C   2. Morbid obesity ([address]     3. Hypothyroidism, unspecified type           PLAN:     Orders Placed This Encounter   Procedures    Basic Metabolic Panel     Standing Status:   Future     Standing Expiration Date:   4/11/2023    Hemoglobin A1C     Standing Status:   Future     Standing Expiration Date:   4/11/2023    POCT Glucose      continue current insulin via pump setting A1c goal of 7 or lower patient to follow-up with bariatric clinic at [address] [place] [address] PrePay This Encounter   Procedures    POCT Glucose       Subjective:     Chief Complaint   Patient presents with    Diabetes     Vitals:    04/11/22 0910   BP: 126/82   Pulse: 90   SpO2: 95%   Weight: 265 lb (120.2 kg)   Height: 5' 4\" (1.626 m)     Wt Readings from Last 3 Encounters:   04/11/22 265 lb (120.2 kg)   04/06/22 272 lb (123.4 kg)   03/02/22 277 lb (125.6 kg)     BP Readings from Last 3 Encounters:   04/11/22 126/82   04/06/22 128/84   03/02/22 138/72     Follow-up with type 2 diabetes obesity hypothyroidism not on replacement patient has not been using insulin pump just recently started not much information to download on it obesity patient going to [facility] in [facility] for bariatric surgery BMI 45 A1c has gone up  Patient has been losing weight    Diabetes  She presents for her follow-up diabetic visit. She has type 2 diabetes mellitus. Her disease course has been fluctuating. Associated symptoms include fatigue. Pertinent negatives for diabetes include no polydipsia and no polyuria. Symptoms are worsening. Risk factors for coronary artery disease include obesity. Current diabetic treatment includes insulin pump. Her overall blood glucose range is >200 mg/dl.  (Reviewed pump settings basal rate 2 units/h carb ratio was 18 sensitivity was 50      Lab Results       Component                Value               Date                       LABA1C                   9.8 (H)             04/07/2022            ) An ACE inhibitor/angiotensin II receptor blocker is being taken.      Past Medical History:   Diagnosis Date    Abnormal Pap smear of cervix     Anxiety 7/10/2018    Anxiety     ASCUS with positive high risk HPV cervical 12/03/2018    Bilateral carpal tunnel syndrome 5/15/2019    Bipolar disorder, in partial remission, most recent episode mixed ([address] 12/3/2021    Bipolar disorder, in partial remission, most recent episode mixed ([address] 12/3/2021    Chronic constipation 12/3/2021    Colon polyp     Diabetes type 2, uncontrolled ([address]     Essential hypertension 5/29/2018    Fibromyalgia 5/29/2018    Gastroesophageal reflux disease without esophagitis 12/3/2021    Gastroesophageal reflux disease without esophagitis 12/3/2021    Herpes     HPV in female 12/03/2018    HPV 18    Hyperlipidemia     Obesity     PTSD (post-traumatic stress disorder) 12/3/2021    PTSD (post-traumatic stress disorder) 12/3/2021    Reactive depression 6/5/2019    Sleep apnea     Thyroid disease      Past Surgical History:   Procedure Laterality Date    COLONOSCOPY      COLONOSCOPY N/A 1/27/2022    COLORECTAL CANCER SCREENING, HIGH RISK performed by Shaina Cabral MD at [facility] Marital status:      Spouse name: Not on file    Number of children: Not on file    Years of education: Not on file    Highest education level: Not on file   Occupational History    Occupation: [employer]   Tobacco Use    Smoking status: Never Smoker    Smokeless tobacco: Never Used   Vaping Use    Vaping Use: Never used   Substance and Sexual Activity    Alcohol use: Yes     Comment: socially    Drug use: No    Sexual activity: Yes     Partners: Male     Comment: No BC; primary infertility   Other Topics Concern    Not on file   Social History Narrative    Not on file     Social Determinants of Health     Financial Resource Strain: Low Risk     Difficulty of Paying Living Expenses: Not hard at all   Food Insecurity: No Food Insecurity    Worried About Running Out of Food in the Last Year: Never true    [address] in the Last Year: Never true   Transportation Needs:     Lack of Transportation (Medical): Not on file    Lack of Transportation (Non-Medical):  Not on file   Physical Activity:     Days of Exercise per Week: Not on file    Minutes of Exercise per Session: Not on file   Stress:     Feeling of Stress : Not on file   Social Connections:     Frequency of Communication with Friends and Family: Not on file    Frequency of Social Gatherings with Friends and Family: Not on file    Attends Gnosticist Services: Not on file    Active Member of Clubs or Organizations: Not on file    Attends Club or Organization Meetings: Not on file    Marital Status: Not on file   Intimate Partner Violence:     Fear of Current or Ex-Partner: Not on file    Emotionally Abused: Not on file    Physically Abused: Not on file    Sexually Abused: Not on file   Housing Stability:     Unable to Pay for Housing in the Last Year: Not on file    Number of Places Lived in the Last Year: Not on file    Unstable Housing in the Last Year: Not on file     Family History   Problem Relation Age of Onset    Diabetes Father     High Blood Pressure Father     Retinal Detachment Father     Cancer Father         skin    Thyroid Disease Mother     Kidney Disease Sister     Inflam Bowel Dis Brother     No Known Problems Sister     Other Brother     No Known Problems Paternal Grandfather     No Known Problems Paternal Grandmother     No Known Problems Maternal Grandmother     No Known Problems Maternal Grandfather     No Known Problems Other     Breast Cancer Maternal Aunt     Colon Cancer (constipation), Disp: 4 Bottle, Rfl: 0    Elastic Bandages & Supports (ABDOMINAL BINDER/ELASTIC LARGE) MISC, Wear around abdomen or pelvis as needed for comfort., Disp: 1 each, Rfl: 1    ondansetron (ZOFRAN-ODT) 4 MG disintegrating tablet, Take 1 tablet by mouth 3 times daily as needed for Nausea or Vomiting, Disp: 21 tablet, Rfl: 0    Blood Glucose Monitoring Suppl (ONE TOUCH ULTRA 2) w/Device KIT, USE AS DIRECTED TID, Disp: , Rfl: 0  Lab Results   Component Value Date     04/07/2022    K 4.3 04/07/2022     04/07/2022    CO2 22 04/07/2022    BUN 17 04/07/2022    CREATININE 0.72 04/07/2022    GLUCOSE 308 04/11/2022    CALCIUM 9.4 04/07/2022    PROT 8.0 12/07/2021    LABALBU 4.1 12/07/2021    BILITOT 0.4 12/07/2021    ALKPHOS 66 12/07/2021    AST 31 12/07/2021    ALT 57 (H) 12/07/2021    LABGLOM >60.0 04/07/2022    GFRAA >60.0 04/07/2022    GLOB 4.1 (H) 12/20/2019     Lab Results   Component Value Date    WBC 7.8 12/07/2021    HGB 13.4 12/07/2021    HCT 40.6 12/07/2021    MCV 82.4 12/07/2021     12/07/2021     Lab Results   Component Value Date    LABA1C 9.8 (H) 04/07/2022    LABA1C 8.4 (H) 12/03/2021    LABA1C 11.4 09/23/2021     Lab Results   Component Value Date    CHOLFAST 195 12/03/2021    CHOLFAST 144 06/16/2018    TRIGLYCFAST 163 (H) 12/03/2021    TRIGLYCFAST 187 06/16/2018    HDL 31 (L) 12/03/2021    HDL 30 (L) 06/16/2018    HDL 32 (L) 03/10/2018    LDLCALC 131 (H) 12/03/2021    LDLCALC 77 06/16/2018    LDLCALC 134 (H) 03/10/2018    CHOL 199 03/10/2018    CHOL 197 09/12/2015    CHOL 205 (H) 03/14/2015    TRIG 163 03/10/2018    TRIG 361 (H) 09/12/2015    TRIG 243 (H) 03/14/2015     No results found for: TESTM  Lab Results   Component Value Date    TSH 2.080 10/27/2021    TSH 3.380 03/04/2019    TSH 2.180 05/31/2018    T4FREE 0.93 10/27/2021    T4FREE 0.92 03/04/2019    T4FREE 0.92 (L) 09/12/2015     No results found for: TPOABS    Review of Systems   Constitutional: Positive for fatigue. Eyes: Negative. Cardiovascular: Negative. Endocrine: Negative. Negative for polydipsia and polyuria. Musculoskeletal: Negative. Neurological: Negative. Psychiatric/Behavioral: Positive for dysphoric mood and sleep disturbance. All other systems reviewed and are negative. Objective:   Physical Exam  Vitals reviewed. Constitutional:       Appearance: Normal appearance. She is obese. HENT:      Head: Normocephalic and atraumatic. Right Ear: External ear normal.      Left Ear: External ear normal.      Nose: Nose normal.   Eyes:      General: No scleral icterus. Right eye: No discharge. Left eye: No discharge. Extraocular Movements: Extraocular movements intact. Conjunctiva/sclera: Conjunctivae normal.   Cardiovascular:      Rate and Rhythm: Normal rate. Pulmonary:      Effort: Pulmonary effort is normal.   Musculoskeletal:         General: Normal range of motion. Cervical back: Normal range of motion and neck supple. Neurological:      General: No focal deficit present. Mental Status: She is alert and oriented to person, place, and time.    Psychiatric:         Mood and Affect: Mood normal.         Behavior: Behavior normal.

## 2022-04-12 PROCEDURE — 94726 PLETHYSMOGRAPHY LUNG VOLUMES: CPT | Performed by: INTERNAL MEDICINE

## 2022-04-12 PROCEDURE — 94060 EVALUATION OF WHEEZING: CPT | Performed by: INTERNAL MEDICINE

## 2022-04-12 PROCEDURE — 94729 DIFFUSING CAPACITY: CPT | Performed by: INTERNAL MEDICINE

## 2022-04-12 NOTE — PROCEDURES
[address]                      [address], [address] [facility]                               PULMONARY FUNCTION    PATIENT NAME: Vijaya MENA            :        1975  MED REC NO:   [id number]                            ROOM:  ACCOUNT NO:   [de-identified]                           ADMIT DATE: 2022  PROVIDER:     Missy Arguelles MD    DATE OF PROCEDURE:  2022    REQUESTING PROVIDER:  _____    INTERPRETING PROVIDER:  Marcos Small MD    REASON FOR STUDY:  Shortness of breath and wheezing. INTERPRETATION:  FVC is 2.87, 81% of predicted; FEV1 is 2.34, 81% of  predicted; FEV1/FVC is 81%; and FEF 25-75% is 2.32, 74% of predicted. Postbronchodilator study shows FVC is 3.03, 5% improvement; FEV1 is  2.44, 3% improvement. At midflow, FEF 25-75% is 2.72, 17% improvement. Lung volume study shows residual volume is 1.88, 109% of predicted; TLC  is 4.80, 94% of predicted; RV to TLC ratio is 39.11, 116% of predicted;  diffusion capacity is 21.11, 91% of predicted; airway resistance is  1.03, 55% of predicted. SUMMARY:  FVC/FEV1 within normal range with normal ratio with minimally  decreased midflow suggests there is possibility of mild obstruction at  small airway level. There is also positive response to bronchodilator  at midflow level. Static lung volume within normal range. Diffusion  capacity is normal.  Airway resistance is low. Clinical correlation is  requested.         Kylah Velasco MD    D: 2022 22:50:58       T: 2022 3:03:16     [email or web address]  Job#: [id number]     Doc#: [id number]    CC:

## 2022-04-13 DIAGNOSIS — E11.9 TYPE 2 DIABETES MELLITUS WITHOUT COMPLICATION, WITHOUT LONG-TERM CURRENT USE OF INSULIN (HCC): Primary | ICD-10-CM

## 2022-04-13 NOTE — TELEPHONE ENCOUNTER
Insurance will no longer cover Humalog. Novolog pending if appropriate. Rx requested:  Requested Prescriptions     Pending Prescriptions Disp Refills    NOVOLOG 100 UNIT/ML injection vial 30 mL 3     Sig: Use via insulin pump max daily dose 50 units.   E11.9         Last Office Visit:   4/11/2022      Next Visit Date:  Future Appointments   Date Time Provider Norbert Pryor   4/18/2022  1:15 PM Yoselyn Obregon DO [address] [place] [address]   4/19/2022  3:00 PM [facility]   4/25/2022  3:30 PM MD [facility]   5/4/2022  2:15 PM DO [facility]   6/15/2022  2:30 PM Yoselyn Obregon DO [facility]   7/11/2022  9:45 AM NENA Servin  [facility]

## 2022-04-18 ENCOUNTER — PROCEDURE VISIT (OUTPATIENT)
Dept: PHYSICAL MEDICINE AND REHAB | Age: 47
End: 2022-04-18
Payer: MEDICAID

## 2022-04-18 DIAGNOSIS — M79.10 MYALGIA: ICD-10-CM

## 2022-04-18 PROCEDURE — 20553 NJX 1/MLT TRIGGER POINTS 3/>: CPT | Performed by: PHYSICAL MEDICINE & REHABILITATION

## 2022-04-18 PROCEDURE — 96372 THER/PROPH/DIAG INJ SC/IM: CPT | Performed by: PHYSICAL MEDICINE & REHABILITATION

## 2022-04-18 RX ORDER — LIDOCAINE HYDROCHLORIDE 10 MG/ML
23 INJECTION, SOLUTION INFILTRATION; PERINEURAL ONCE
Status: COMPLETED | OUTPATIENT
Start: 2022-04-18 | End: 2022-04-18

## 2022-04-18 RX ORDER — CYANOCOBALAMIN 1000 UG/ML
1000 INJECTION INTRAMUSCULAR; SUBCUTANEOUS ONCE
Status: COMPLETED | OUTPATIENT
Start: 2022-04-18 | End: 2022-04-18

## 2022-04-18 RX ADMIN — LIDOCAINE HYDROCHLORIDE 23 ML: 10 INJECTION, SOLUTION INFILTRATION; PERINEURAL at 14:32

## 2022-04-18 RX ADMIN — CYANOCOBALAMIN 1000 MCG: 1000 INJECTION INTRAMUSCULAR; SUBCUTANEOUS at 14:32

## 2022-05-02 ENCOUNTER — OFFICE VISIT (OUTPATIENT)
Dept: ENDOCRINOLOGY | Age: 47
End: 2022-05-02
Payer: MEDICAID

## 2022-05-02 VITALS
BODY MASS INDEX: 45.24 KG/M2 | HEART RATE: 73 BPM | SYSTOLIC BLOOD PRESSURE: 134 MMHG | OXYGEN SATURATION: 98 % | DIASTOLIC BLOOD PRESSURE: 82 MMHG | HEIGHT: 64 IN | WEIGHT: 265 LBS

## 2022-05-02 DIAGNOSIS — E66.01 MORBID OBESITY (HCC): ICD-10-CM

## 2022-05-02 DIAGNOSIS — E11.9 TYPE 2 DIABETES MELLITUS WITHOUT COMPLICATION, WITHOUT LONG-TERM CURRENT USE OF INSULIN (HCC): Primary | ICD-10-CM

## 2022-05-02 DIAGNOSIS — Z46.81 INSULIN PUMP TITRATION: ICD-10-CM

## 2022-05-02 LAB
CHP ED QC CHECK: NORMAL
GLUCOSE BLD-MCNC: 341 MG/DL

## 2022-05-02 PROCEDURE — 1036F TOBACCO NON-USER: CPT | Performed by: INTERNAL MEDICINE

## 2022-05-02 PROCEDURE — G8417 CALC BMI ABV UP PARAM F/U: HCPCS | Performed by: INTERNAL MEDICINE

## 2022-05-02 PROCEDURE — 2022F DILAT RTA XM EVC RTNOPTHY: CPT | Performed by: INTERNAL MEDICINE

## 2022-05-02 PROCEDURE — 82962 GLUCOSE BLOOD TEST: CPT | Performed by: INTERNAL MEDICINE

## 2022-05-02 PROCEDURE — G8427 DOCREV CUR MEDS BY ELIG CLIN: HCPCS | Performed by: INTERNAL MEDICINE

## 2022-05-02 PROCEDURE — 3046F HEMOGLOBIN A1C LEVEL >9.0%: CPT | Performed by: INTERNAL MEDICINE

## 2022-05-02 PROCEDURE — 99214 OFFICE O/P EST MOD 30 MIN: CPT | Performed by: INTERNAL MEDICINE

## 2022-05-02 ASSESSMENT — ENCOUNTER SYMPTOMS: EYES NEGATIVE: 1

## 2022-05-02 NOTE — PROGRESS NOTES
5/2/2022    Assessment:       Diagnosis Orders   1. Type 2 diabetes mellitus without complication, without long-term current use of insulin ([facility])  POCT Glucose    Basic Metabolic Panel    Hemoglobin A1C    Microalbumin / Creatinine Urine Ratio   2. Morbid obesity ([address]     3. Insulin pump titration           PLAN:     Pump adjustment was made increase basal rate to 3 units/h also increase max bolus dose to 25 units per each meals  Patient also planning to get Medtronic sensor CGM for improved control A1c goal of 7 or lower  More than 50% of 30 minutes spent patient education counseling    Orders Placed This Encounter   Procedures    Basic Metabolic Panel     Standing Status:   Future     Standing Expiration Date:   5/2/2023    Hemoglobin A1C     Standing Status:   Future     Standing Expiration Date:   5/2/2023    Microalbumin / Creatinine Urine Ratio     Standing Status:   Future     Standing Expiration Date:   5/2/2023    POCT Glucose         Orders Placed This Encounter   Procedures    POCT Glucose       Subjective:     Chief Complaint   Patient presents with    Diabetes     Vitals:    05/02/22 0905   BP: 134/82   Pulse: 73   SpO2: 98%   Weight: 265 lb (120.2 kg)   Height: 5' 4\" (1.626 m)     Wt Readings from Last 3 Encounters:   05/02/22 265 lb (120.2 kg)   04/11/22 265 lb (120.2 kg)   04/06/22 272 lb (123.4 kg)     BP Readings from Last 3 Encounters:   05/02/22 134/82   04/11/22 126/82   04/06/22 128/84     Follow-up with type 2 diabetes patient the Medtronic insulin pump she is on 2 units/h also taking insulin bolus based on carbs overall blood sugars have been higher lately insulin changed from Humalog to NovoLog  History of obesity BMI is at 45 overall weight has been coming down    Diabetes  She presents for her follow-up diabetic visit. She has type 2 diabetes mellitus. Associated symptoms include fatigue. Pertinent negatives for diabetes include no polydipsia and no polyuria. Symptoms are worsening. Risk factors for coronary artery disease include obesity. Current diabetic treatment includes insulin pump. Her overall blood glucose range is >200 mg/dl. (Lab Results       Component                Value               Date                       LABA1C                   9.8 (H)             04/07/2022                Blood sugars average on the pump 339±63  Overall blood sugars in the 2 50-4 00+ range  No hypoglycemia) An ACE inhibitor/angiotensin II receptor blocker is being taken.      Past Medical History:   Diagnosis Date    Abnormal Pap smear of cervix     Anxiety 7/10/2018    Anxiety     ASCUS with positive high risk HPV cervical 12/03/2018    Bilateral carpal tunnel syndrome 5/15/2019    Bipolar disorder, in partial remission, most recent episode mixed ([address] 12/3/2021    Bipolar disorder, in partial remission, most recent episode mixed ([address] 12/3/2021    Chronic constipation 12/3/2021    Colon polyp     Diabetes type 2, uncontrolled ([address]     Essential hypertension 5/29/2018    Fibromyalgia 5/29/2018    Gastroesophageal reflux disease without esophagitis 12/3/2021    Gastroesophageal reflux disease without esophagitis 12/3/2021    Herpes     HPV in female 12/03/2018    HPV 18    Hyperlipidemia     Obesity     PTSD (post-traumatic stress disorder) 12/3/2021    PTSD (post-traumatic stress disorder) 12/3/2021    Reactive depression 6/5/2019    Sleep apnea     Thyroid disease      Past Surgical History:   Procedure Laterality Date    COLONOSCOPY      COLONOSCOPY N/A 1/27/2022    COLORECTAL CANCER SCREENING, HIGH RISK performed by Esteban Mitchell MD at [facility] Marital status:      Spouse name: Not on file    Number of children: Not on file    Years of education: Not on file    Highest education level: Not on file   Occupational History    Occupation: [address]   Tobacco Use    Smoking status: Never Smoker    Smokeless tobacco: Never Used   Vaping Use    Vaping Use: Never used   Substance and Sexual Activity    Alcohol use: Yes     Comment: socially    Drug use: No    Sexual activity: Yes     Partners: Male     Comment: No BC; primary infertility   Other Topics Concern    Not on file   Social History Narrative    Not on file     Social Determinants of Health     Financial Resource Strain: Low Risk     Difficulty of Paying Living Expenses: Not hard at all   Food Insecurity: No Food Insecurity    Worried About [address] in the Last Year: Never true    [address] in the Last Year: Never true   Transportation Needs:     Lack of Transportation (Medical): Not on file    Lack of Transportation (Non-Medical):  Not on file   Physical Activity:     Days of Exercise per Week: Not on file    Minutes of Exercise per Session: Not on file   Stress:     Feeling of Stress : Not on file   Social Connections:     Frequency of Communication with Friends and Family: Not on file    Frequency of Social Gatherings with Friends and Family: Not on file    Attends Worship Services: Not on file    Active Member of Clubs or Organizations: Not on file    Attends Club or Organization Meetings: Not on file    Marital Status: Not on file   Intimate Partner Violence:     Fear of Current or Ex-Partner: Not on file    Emotionally Abused: Not on file    Physically Abused: Not on file    Sexually Abused: Not on file   Housing Stability:     Unable to Pay for Housing in the Last Year: Not on file    Number of [place] in the Last Year: Not on file    Unstable Housing in the Last Year: Not on file     Family History   Problem Relation Age of Onset    Diabetes Father     High Blood Pressure Father     Retinal Detachment Father     Cancer Father         skin    Thyroid Disease Mother     Kidney Disease Sister     Inflam Bowel Dis Brother     No Known Problems Sister     Other Brother     No Known Problems Paternal Grandfather     No Known Problems Paternal Grandmother     No Known Problems Maternal Grandmother     No Known Problems Maternal Grandfather     No Known Problems Other     Breast Cancer Maternal Aunt     Colon Cancer Neg Hx     Eclampsia Neg Hx     Hypertension Neg Hx     Ovarian Cancer Neg Hx      Labor Neg Hx     Spont Abortions Neg Hx     Stroke Neg Hx      Allergies   Allergen Reactions    Niacin And Related Swelling and Rash       Current Outpatient Medications:     NOVOLOG 100 UNIT/ML injection vial, Use via insulin pump max daily dose 50 units.   E11.9, Disp: 30 mL, Rfl: 3    metFORMIN (GLUCOPHAGE) 1000 MG tablet, TAKE 1 TABLET BY MOUTH TWICE DAILY WITH MEALS, Disp: 180 tablet, Rfl: 3    lubiprostone (AMITIZA) 24 MCG capsule, Take 1 capsule by mouth 2 times daily (with meals), Disp: 180 capsule, Rfl: 4    docusate sodium (COLACE) 100 MG capsule, Take 1 capsule by mouth 2 times daily as needed for Constipation, Disp: 180 capsule, Rfl: 4    insulin lispro (HUMALOG) 100 UNIT/ML injection vial, Use via insulin pump max daily dose 50 units, Disp: 30 mL, Rfl: 3    Accu-Chek FastClix Lancets MISC, Test 4x daily, Disp: 150 each, Rfl: 3    blood glucose test strips (ACCU-CHEK GUIDE) strip, Test 4x daily, Disp: 150 each, Rfl: 3    melatonin 3 MG TABS tablet, Take 1 mg by mouth daily, Disp: , Rfl:     atorvastatin (LIPITOR) 40 MG tablet, Take 1 tablet by mouth daily, Disp: 90 tablet, Rfl: 3    lisinopril (PRINIVIL;ZESTRIL) 20 MG tablet, Take 1 tablet by mouth daily, Disp: 90 tablet, Rfl: 3    fenofibrate (TRIGLIDE) 160 MG tablet, Take 1 tablet by mouth daily, Disp: 90 tablet, Rfl: 3    ARIPiprazole (ABILIFY) 5 MG tablet, Take 1 tablet by mouth daily, Disp: 90 tablet, Rfl: 1    vitamin D (ERGOCALCIFEROL) 1.25 MG (05870 UT) CAPS capsule, Take 1 capsule by mouth once a week, Disp: 12 capsule, Rfl: 3    aspirin 81 MG tablet, Take 1 tablet by mouth daily, Disp: 90 tablet, Rfl: 3    Insulin Pen Needle 32G X 4 MM MISC, 1 each by Does not apply route 6 times daily, Disp: 600 each, Rfl: 3    Lancets MISC, 1 each by Does not apply route 3 times daily DX:E11.65, IDDM (please dispense covered brand), Disp: 300 each, Rfl: 3    Sodium Phosphates (FLEET) 7-19 GM/118ML, Place 1 enema rectally every 2 hours as needed (constipation), Disp: 4 Bottle, Rfl: 0    Elastic Bandages & Supports (ABDOMINAL BINDER/ELASTIC LARGE) MISC, Wear around abdomen or pelvis as needed for comfort., Disp: 1 each, Rfl: 1    ondansetron (ZOFRAN-ODT) 4 MG disintegrating tablet, Take 1 tablet by mouth 3 times daily as needed for Nausea or Vomiting, Disp: 21 tablet, Rfl: 0    Blood Glucose Monitoring Suppl (ONE TOUCH ULTRA 2) w/Device KIT, USE AS DIRECTED TID, Disp: , Rfl: 0  Lab Results   Component Value Date     04/07/2022    K 4.3 04/07/2022     04/07/2022    CO2 22 04/07/2022    BUN 17 04/07/2022    CREATININE 0.72 04/07/2022    GLUCOSE 341 05/02/2022    CALCIUM 9.4 04/07/2022    PROT 8.0 12/07/2021    LABALBU 4.1 12/07/2021    BILITOT 0.4 12/07/2021    ALKPHOS 66 12/07/2021    AST 31 12/07/2021    ALT 57 (H) 12/07/2021    LABGLOM >60.0 04/07/2022    GFRAA >60.0 04/07/2022    GLOB 4.1 (H) 12/20/2019     Lab Results   Component Value Date    WBC 7.8 12/07/2021    HGB 13.4 12/07/2021    HCT 40.6 12/07/2021    MCV 82.4 12/07/2021     12/07/2021     Lab Results   Component Value Date    LABA1C 9.8 (H) 04/07/2022    LABA1C 8.4 (H) 12/03/2021    LABA1C 11.4 09/23/2021     Lab Results   Component Value Date    CHOLFAST 195 12/03/2021    CHOLFAST 144 06/16/2018    TRIGLYCFAST 163 (H) 12/03/2021    TRIGLYCFAST 187 06/16/2018    HDL 31 (L) 12/03/2021    HDL 30 (L) 06/16/2018    HDL 32 (L) 03/10/2018    LDLCALC 131 (H) 12/03/2021    LDLCALC 77 06/16/2018    LDLCALC 134 (H) 03/10/2018    CHOL 199 03/10/2018    CHOL 197 09/12/2015    CHOL 205 (H) 03/14/2015    TRIG 163 03/10/2018    TRIG 361 (H) 09/12/2015    TRIG 243 (H) 03/14/2015     No results found for: TESTM  Lab Results   Component Value Date    TSH 2.080 10/27/2021    TSH 3.380 03/04/2019    TSH 2.180 05/31/2018    T4FREE 0.93 10/27/2021    T4FREE 0.92 03/04/2019    T4FREE 0.92 (L) 09/12/2015     No results found for: TPOABS    Review of Systems   Constitutional: Positive for fatigue. Eyes: Negative. Cardiovascular: Negative. Endocrine: Negative for polydipsia and polyuria. Psychiatric/Behavioral: Negative. Objective:   Physical Exam  Vitals reviewed. Constitutional:       General: She is not in acute distress. Appearance: Normal appearance. She is obese. HENT:      Head: Normocephalic and atraumatic. Right Ear: External ear normal.      Left Ear: External ear normal.      Nose: Nose normal.   Eyes:      General: No scleral icterus. Right eye: No discharge. Left eye: No discharge. Extraocular Movements: Extraocular movements intact. Conjunctiva/sclera: Conjunctivae normal.   Cardiovascular:      Rate and Rhythm: Normal rate. Pulmonary:      Effort: Pulmonary effort is normal.   Musculoskeletal:         General: Normal range of motion. Cervical back: Normal range of motion and neck supple. Neurological:      General: No focal deficit present. Mental Status: She is alert and oriented to person, place, and time.    Psychiatric:         Mood and Affect: Mood normal.         Behavior: Behavior normal.

## 2022-05-04 ENCOUNTER — PROCEDURE VISIT (OUTPATIENT)
Dept: PHYSICAL MEDICINE AND REHAB | Age: 47
End: 2022-05-04
Payer: MEDICAID

## 2022-05-04 DIAGNOSIS — M79.10 MYALGIA: Primary | ICD-10-CM

## 2022-05-04 PROCEDURE — 20553 NJX 1/MLT TRIGGER POINTS 3/>: CPT | Performed by: PHYSICAL MEDICINE & REHABILITATION

## 2022-05-04 PROCEDURE — 96372 THER/PROPH/DIAG INJ SC/IM: CPT | Performed by: PHYSICAL MEDICINE & REHABILITATION

## 2022-05-04 RX ORDER — LIDOCAINE HYDROCHLORIDE 10 MG/ML
8 INJECTION, SOLUTION INFILTRATION; PERINEURAL ONCE
Status: COMPLETED | OUTPATIENT
Start: 2022-05-04 | End: 2022-05-04

## 2022-05-04 RX ORDER — LIDOCAINE HYDROCHLORIDE 10 MG/ML
15 INJECTION, SOLUTION INFILTRATION; PERINEURAL ONCE
Status: COMPLETED | OUTPATIENT
Start: 2022-05-04 | End: 2022-05-04

## 2022-05-04 RX ORDER — CYANOCOBALAMIN 1000 UG/ML
1000 INJECTION INTRAMUSCULAR; SUBCUTANEOUS ONCE
Status: COMPLETED | OUTPATIENT
Start: 2022-05-04 | End: 2022-05-04

## 2022-05-04 RX ADMIN — LIDOCAINE HYDROCHLORIDE 15 ML: 10 INJECTION, SOLUTION INFILTRATION; PERINEURAL at 15:52

## 2022-05-04 RX ADMIN — CYANOCOBALAMIN 1000 MCG: 1000 INJECTION INTRAMUSCULAR; SUBCUTANEOUS at 15:52

## 2022-05-04 RX ADMIN — LIDOCAINE HYDROCHLORIDE 8 ML: 10 INJECTION, SOLUTION INFILTRATION; PERINEURAL at 15:58

## 2022-05-09 ENCOUNTER — OFFICE VISIT (OUTPATIENT)
Dept: PULMONOLOGY | Age: 47
End: 2022-05-09
Payer: MEDICAID

## 2022-05-09 VITALS
SYSTOLIC BLOOD PRESSURE: 122 MMHG | DIASTOLIC BLOOD PRESSURE: 76 MMHG | TEMPERATURE: 97.4 F | HEIGHT: 64 IN | WEIGHT: 258 LBS | BODY MASS INDEX: 44.05 KG/M2 | HEART RATE: 88 BPM | OXYGEN SATURATION: 99 %

## 2022-05-09 DIAGNOSIS — E66.9 OBESITY, UNSPECIFIED CLASSIFICATION, UNSPECIFIED OBESITY TYPE, UNSPECIFIED WHETHER SERIOUS COMORBIDITY PRESENT: ICD-10-CM

## 2022-05-09 DIAGNOSIS — J45.20 MILD INTERMITTENT ASTHMA, UNSPECIFIED WHETHER COMPLICATED: ICD-10-CM

## 2022-05-09 DIAGNOSIS — R06.02 SHORTNESS OF BREATH: Primary | ICD-10-CM

## 2022-05-09 DIAGNOSIS — U09.9 POST-COVID SYNDROME: ICD-10-CM

## 2022-05-09 PROCEDURE — G8417 CALC BMI ABV UP PARAM F/U: HCPCS | Performed by: INTERNAL MEDICINE

## 2022-05-09 PROCEDURE — 99213 OFFICE O/P EST LOW 20 MIN: CPT | Performed by: INTERNAL MEDICINE

## 2022-05-09 PROCEDURE — 1036F TOBACCO NON-USER: CPT | Performed by: INTERNAL MEDICINE

## 2022-05-09 PROCEDURE — G8427 DOCREV CUR MEDS BY ELIG CLIN: HCPCS | Performed by: INTERNAL MEDICINE

## 2022-05-09 NOTE — PROGRESS NOTES
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/2018    HPV 18    Hyperlipidemia     Obesity     PTSD (post-traumatic stress disorder) 12/3/2021    PTSD (post-traumatic stress disorder) 12/3/2021    Reactive depression 6/5/2019    Sleep apnea     Thyroid disease        Past Surgical History  Past Surgical History:   Procedure Laterality Date    COLONOSCOPY      COLONOSCOPY N/A 1/27/2022    COLORECTAL CANCER SCREENING, HIGH RISK performed by Ahmet Ugalde MD at [facility]       Allergies  Allergies   Allergen Reactions    Niacin And Related Swelling and Rash       Medications  Current Outpatient Medications   Medication Sig Dispense Refill    Fluticasone furoate-vilanterol (BREO ELLIPTA) 200-25 MCG/INH AEPB inhaler Inhale 1 puff into the lungs daily 1 each 5    NOVOLOG 100 UNIT/ML injection vial Use via insulin pump max daily dose 50 units.   E11.9 30 mL 3    metFORMIN (GLUCOPHAGE) 1000 MG tablet TAKE 1 TABLET BY MOUTH TWICE DAILY WITH MEALS 180 tablet 3    lubiprostone (AMITIZA) 24 MCG capsule Take 1 capsule by mouth 2 times daily (with meals) 180 capsule 4    docusate sodium (COLACE) 100 MG capsule Take 1 capsule by mouth 2 times daily as needed for Constipation 180 capsule 4    insulin lispro (HUMALOG) 100 UNIT/ML injection vial Use via insulin pump max daily dose 50 units 30 mL 3    Accu-Chek FastClix Lancets MISC Test 4x daily 150 each 3    blood glucose test strips (ACCU-CHEK GUIDE) strip Test 4x daily 150 each 3    melatonin 3 MG TABS tablet Take 1 mg by mouth daily      atorvastatin (LIPITOR) 40 MG tablet Take 1 tablet by mouth daily 90 tablet 3    lisinopril (PRINIVIL;ZESTRIL) 20 MG tablet Take 1 tablet by mouth daily 90 tablet 3    fenofibrate (TRIGLIDE) 160 MG tablet Take 1 tablet by mouth daily 90 tablet 3    ARIPiprazole (ABILIFY) 5 MG tablet Take 1 tablet by mouth daily 90 tablet 1    vitamin D (ERGOCALCIFEROL) 1.25 MG (97863 UT) CAPS capsule Take 1 capsule by mouth once a week 12 capsule 3    aspirin 81 MG tablet Take 1 tablet by mouth daily 90 tablet 3    Insulin Pen Needle 32G X 4 MM MISC 1 each by Does not apply route 6 times daily 600 each 3    Lancets MISC 1 each by Does not apply route 3 times daily DX:E11.65, IDDM (please dispense covered brand) 300 each 3    Sodium Phosphates (FLEET) 7-19 GM/118ML Place 1 enema rectally every 2 hours as needed (constipation) 4 Bottle 0    Elastic Bandages & Supports (ABDOMINAL BINDER/ELASTIC LARGE) MISC Wear around abdomen or pelvis as needed for comfort. 1 each 1    ondansetron (ZOFRAN-ODT) 4 MG disintegrating tablet Take 1 tablet by mouth 3 times daily as needed for Nausea or Vomiting 21 tablet 0    Blood Glucose Monitoring Suppl (ONE TOUCH ULTRA 2) w/Device KIT USE AS DIRECTED TID  0     No current facility-administered medications for this visit.        Social History  Social History     Tobacco Use    Smoking status: Never Smoker    Smokeless tobacco: Never Used   Substance Use Topics    Alcohol use: Yes     Comment: socially       Family History  Family History   Problem Relation Age of Onset    Diabetes Father     High Blood Pressure Father     Retinal Detachment Father     Cancer Father         skin    Thyroid Disease Mother     Kidney Disease Sister     Inflam Bowel Dis Brother     No Known Problems Sister     Other Brother     No Known Problems Paternal Grandfather     No Known Problems Paternal Grandmother     No Known Problems Maternal Grandmother     No Known Problems Maternal Grandfather     No Known Problems Other     Breast Cancer Maternal Aunt     Colon Cancer Neg Hx     Eclampsia Neg Hx     Hypertension Neg Hx     Ovarian Cancer Neg Hx      Labor Neg Hx     Spont Abortions Neg Hx     Stroke Neg Hx Review of Systems  All review of systems has been obtained and negative other than what was mentionedin HPI. Physical Exam                 Vitals:    05/09/22 1425   BP: 122/76   Pulse: 88   Temp: 97.4 °F (36.3 °C)   TempSrc: Tympanic   SpO2: 99%   Weight: 258 lb (117 kg)   Height: 5' 4\" (1.626 m)          General appearance: Well appearing. No acute distress. AAOX3  Head: Normocephalic, without obvious abnormality, atraumatic   Eyes:Pupils bilateral equal and reactive, EOM intact. Normal sclera and conjunctiva   Nose: Mucosa pink  Throat: Clear,  [place] 3  Neck: Supple, No JVD. Nothyromegaly. Neck is thick  Lungs: Clear bilaterally. No wheezing. No crackles. No use of accessory muscles. Heart: RRR, S1, S2 normal, no murmur, click, rub or gallop   Abdomen: soft, non-tender, nondistended. Bowel sounds normal. No hernia. No organomegaly. Extremities: extremities normal, atraumatic, no cyanosis, no edema  Skin: Skin color, texture, turgor normal. No rashes or lesions   Neurological: No focal deficits,cranial nerves grossly intact. No weakness. Sensation normal   Psych: Normal Mood  Musculoskeletal: No joint abnormalities. Impression:   Diagnosis Orders   1. Shortness of breath     2. Post-COVID syndrome     3. Mild intermittent asthma, unspecified whether complicated     4. Obesity, unspecified classification, unspecified obesity type, unspecified whether serious comorbidity present             Recommendations:     - I went over results of PFT  with patient in details and answered all her questions  -We will start her on ICS/LABA. -Weight loss and exercise has been advised. Return in about 3 months (around 8/9/2022). phone visit.         Electronically signed by Thien Khan MD on 5/9/2022 at 2:54 PM

## 2022-05-12 ENCOUNTER — OFFICE VISIT (OUTPATIENT)
Dept: BARIATRICS/WEIGHT MGMT | Age: 47
End: 2022-05-12
Payer: COMMERCIAL

## 2022-05-12 ENCOUNTER — TELEPHONE (OUTPATIENT)
Dept: ENDOCRINOLOGY | Age: 47
End: 2022-05-12

## 2022-05-12 VITALS
RESPIRATION RATE: 20 BRPM | SYSTOLIC BLOOD PRESSURE: 155 MMHG | WEIGHT: 266 LBS | HEART RATE: 87 BPM | HEIGHT: 64 IN | DIASTOLIC BLOOD PRESSURE: 86 MMHG | BODY MASS INDEX: 45.41 KG/M2

## 2022-05-12 DIAGNOSIS — E66.01 MORBID OBESITY WITH BMI OF 45.0-49.9, ADULT (HCC): ICD-10-CM

## 2022-05-12 DIAGNOSIS — E11.69 DIABETES MELLITUS TYPE 2 IN OBESE (HCC): ICD-10-CM

## 2022-05-12 DIAGNOSIS — I10 ESSENTIAL HYPERTENSION: Primary | ICD-10-CM

## 2022-05-12 DIAGNOSIS — E66.9 DIABETES MELLITUS TYPE 2 IN OBESE (HCC): ICD-10-CM

## 2022-05-12 DIAGNOSIS — G47.33 OSA (OBSTRUCTIVE SLEEP APNEA): ICD-10-CM

## 2022-05-12 PROCEDURE — 1036F TOBACCO NON-USER: CPT | Performed by: SURGERY

## 2022-05-12 PROCEDURE — 2022F DILAT RTA XM EVC RTNOPTHY: CPT | Performed by: SURGERY

## 2022-05-12 PROCEDURE — G8427 DOCREV CUR MEDS BY ELIG CLIN: HCPCS | Performed by: SURGERY

## 2022-05-12 PROCEDURE — 99204 OFFICE O/P NEW MOD 45 MIN: CPT | Performed by: SURGERY

## 2022-05-12 PROCEDURE — 3046F HEMOGLOBIN A1C LEVEL >9.0%: CPT | Performed by: SURGERY

## 2022-05-12 PROCEDURE — G8417 CALC BMI ABV UP PARAM F/U: HCPCS | Performed by: SURGERY

## 2022-05-12 NOTE — LETTER
Visit Date: 5/12/2022    Patient: Janet Manning  YOB: 1975    Dear Dr. Erin St MD,      I had the pleasure of seeing Shay Ponce in the office today for a consult for weight loss surgery. Her current Weight: 266 lb (120.7 kg), which gives her a Body mass index is 45.66 kg/m². .  We had a long discussion regarding surgical options for weight loss and improvement in co-morbidities. She is considering a bariatric  procedure. We will start the preoperative workup, which includes bloodwork, psychological evaluation, support group attendance, and preoperative medical clearance from you. We will also initiate pre-certification for surgery, which requires a letter of medical necessity from you and often office notes documenting a weight history and co-morbidities. Shay Ponce will require 6 months of medically supervised visits as specified by the patient's insurance. Thank you for allowing me to participate in the care of your patient. If you have any questions or concerns, please do not hesitate to call.       Sincerely,     Dimas Barragan DO  Director of Bariatric and Minimally Invasive Surgery  [facility]  [address], [facility], [facility]: [phone]  F: [phone]

## 2022-05-12 NOTE — TELEPHONE ENCOUNTER
Pt needs a PA for humalog, she has been on novolog in the past and she states it doesn't work for her

## 2022-05-19 ENCOUNTER — TELEPHONE (OUTPATIENT)
Dept: FAMILY MEDICINE CLINIC | Age: 47
End: 2022-05-19

## 2022-05-19 ENCOUNTER — TELEPHONE (OUTPATIENT)
Dept: PULMONOLOGY | Age: 47
End: 2022-05-19

## 2022-05-19 ENCOUNTER — OFFICE VISIT (OUTPATIENT)
Dept: FAMILY MEDICINE CLINIC | Age: 47
End: 2022-05-19
Payer: COMMERCIAL

## 2022-05-19 VITALS
WEIGHT: 262.2 LBS | OXYGEN SATURATION: 98 % | DIASTOLIC BLOOD PRESSURE: 90 MMHG | BODY MASS INDEX: 44.76 KG/M2 | HEART RATE: 79 BPM | HEIGHT: 64 IN | TEMPERATURE: 96.5 F | SYSTOLIC BLOOD PRESSURE: 138 MMHG

## 2022-05-19 DIAGNOSIS — B37.2 CANDIDAL INTERTRIGO: Chronic | ICD-10-CM

## 2022-05-19 DIAGNOSIS — Z79.4 TYPE 2 DIABETES MELLITUS WITH MILD NONPROLIFERATIVE RETINOPATHY OF BOTH EYES, WITH LONG-TERM CURRENT USE OF INSULIN, MACULAR EDEMA PRESENCE UNSPECIFIED (HCC): ICD-10-CM

## 2022-05-19 DIAGNOSIS — E11.3293 TYPE 2 DIABETES MELLITUS WITH MILD NONPROLIFERATIVE RETINOPATHY OF BOTH EYES, WITH LONG-TERM CURRENT USE OF INSULIN, MACULAR EDEMA PRESENCE UNSPECIFIED (HCC): ICD-10-CM

## 2022-05-19 DIAGNOSIS — F31.77 BIPOLAR DISORDER, IN PARTIAL REMISSION, MOST RECENT EPISODE MIXED (HCC): Chronic | ICD-10-CM

## 2022-05-19 DIAGNOSIS — E78.2 MIXED HYPERLIPIDEMIA: ICD-10-CM

## 2022-05-19 DIAGNOSIS — E28.2 PCOS (POLYCYSTIC OVARIAN SYNDROME): ICD-10-CM

## 2022-05-19 DIAGNOSIS — E66.01 MORBID OBESITY DUE TO EXCESS CALORIES (HCC): ICD-10-CM

## 2022-05-19 DIAGNOSIS — I10 ESSENTIAL HYPERTENSION: Chronic | ICD-10-CM

## 2022-05-19 DIAGNOSIS — Z00.00 ENCOUNTER FOR WELL ADULT EXAM WITHOUT ABNORMAL FINDINGS: Primary | ICD-10-CM

## 2022-05-19 DIAGNOSIS — F43.10 PTSD (POST-TRAUMATIC STRESS DISORDER): Chronic | ICD-10-CM

## 2022-05-19 DIAGNOSIS — K21.9 GASTROESOPHAGEAL REFLUX DISEASE WITHOUT ESOPHAGITIS: ICD-10-CM

## 2022-05-19 PROCEDURE — G8427 DOCREV CUR MEDS BY ELIG CLIN: HCPCS | Performed by: FAMILY MEDICINE

## 2022-05-19 PROCEDURE — G8417 CALC BMI ABV UP PARAM F/U: HCPCS | Performed by: FAMILY MEDICINE

## 2022-05-19 PROCEDURE — 99396 PREV VISIT EST AGE 40-64: CPT | Performed by: FAMILY MEDICINE

## 2022-05-19 PROCEDURE — 1036F TOBACCO NON-USER: CPT | Performed by: FAMILY MEDICINE

## 2022-05-19 PROCEDURE — 99214 OFFICE O/P EST MOD 30 MIN: CPT | Performed by: FAMILY MEDICINE

## 2022-05-19 PROCEDURE — 3046F HEMOGLOBIN A1C LEVEL >9.0%: CPT | Performed by: FAMILY MEDICINE

## 2022-05-19 PROCEDURE — 2022F DILAT RTA XM EVC RTNOPTHY: CPT | Performed by: FAMILY MEDICINE

## 2022-05-19 RX ORDER — FENOFIBRATE 160 MG/1
160 TABLET ORAL DAILY
Qty: 30 TABLET | Refills: 12 | Status: SHIPPED | OUTPATIENT
Start: 2022-05-19

## 2022-05-19 RX ORDER — NYSTATIN 100000 U/G
OINTMENT TOPICAL
Qty: 30 G | Refills: 1 | Status: SHIPPED | OUTPATIENT
Start: 2022-05-19 | End: 2022-08-16 | Stop reason: SDUPTHER

## 2022-05-19 RX ORDER — LISINOPRIL 20 MG/1
20 TABLET ORAL DAILY
Qty: 30 TABLET | Refills: 12 | Status: SHIPPED | OUTPATIENT
Start: 2022-05-19

## 2022-05-19 RX ORDER — ATORVASTATIN CALCIUM 40 MG/1
40 TABLET, FILM COATED ORAL DAILY
Qty: 30 TABLET | Refills: 12 | Status: SHIPPED | OUTPATIENT
Start: 2022-05-19

## 2022-05-19 RX ORDER — ARIPIPRAZOLE 5 MG/1
5 TABLET ORAL DAILY
Qty: 30 TABLET | Refills: 12 | Status: SHIPPED | OUTPATIENT
Start: 2022-05-19

## 2022-05-19 RX ORDER — PIOGLITAZONEHYDROCHLORIDE 15 MG/1
15 TABLET ORAL DAILY
Qty: 30 TABLET | Refills: 12 | Status: SHIPPED | OUTPATIENT
Start: 2022-05-19 | End: 2022-09-09 | Stop reason: SDUPTHER

## 2022-05-19 ASSESSMENT — PATIENT HEALTH QUESTIONNAIRE - PHQ9
4. FEELING TIRED OR HAVING LITTLE ENERGY: 0
10. IF YOU CHECKED OFF ANY PROBLEMS, HOW DIFFICULT HAVE THESE PROBLEMS MADE IT FOR YOU TO DO YOUR WORK, TAKE CARE OF THINGS AT HOME, OR GET ALONG WITH OTHER PEOPLE: 0
SUM OF ALL RESPONSES TO PHQ QUESTIONS 1-9: 0
SUM OF ALL RESPONSES TO PHQ QUESTIONS 1-9: 0
9. THOUGHTS THAT YOU WOULD BE BETTER OFF DEAD, OR OF HURTING YOURSELF: 0
7. TROUBLE CONCENTRATING ON THINGS, SUCH AS READING THE NEWSPAPER OR WATCHING TELEVISION: 0
SUM OF ALL RESPONSES TO PHQ QUESTIONS 1-9: 0
SUM OF ALL RESPONSES TO PHQ QUESTIONS 1-9: 0
5. POOR APPETITE OR OVEREATING: 0
2. FEELING DOWN, DEPRESSED OR HOPELESS: 0
8. MOVING OR SPEAKING SO SLOWLY THAT OTHER PEOPLE COULD HAVE NOTICED. OR THE OPPOSITE, BEING SO FIGETY OR RESTLESS THAT YOU HAVE BEEN MOVING AROUND A LOT MORE THAN USUAL: 0
SUM OF ALL RESPONSES TO PHQ9 QUESTIONS 1 & 2: 0
1. LITTLE INTEREST OR PLEASURE IN DOING THINGS: 0
3. TROUBLE FALLING OR STAYING ASLEEP: 0
6. FEELING BAD ABOUT YOURSELF - OR THAT YOU ARE A FAILURE OR HAVE LET YOURSELF OR YOUR FAMILY DOWN: 0

## 2022-05-19 NOTE — TELEPHONE ENCOUNTER
Patient called back in with fax number for Bariatric surgery [phone] and the case number is [id number]. She states there is no specific doctor to address this to just put Urgent.      Thank you

## 2022-05-19 NOTE — PATIENT INSTRUCTIONS
Eating Healthy Foods: Care Instructions  Your Care Instructions     Eating healthy foods can help lower your risk for disease. Healthy food gives you energy and keeps your heart strong, your brain active, your musclesworking, and your bones strong. A healthy diet includes a variety of foods from the basic food groups: grains, vegetables, fruits, milk and milk products, and meat and beans. Some people may eat more of their favorite foods from only one food group and, as a result, miss getting the nutrients they need. So, it is important to pay attention not only to what you eat but also to what you are missing from your diet. You caneat a healthy, balanced diet by making a few small changes. Follow-up care is a key part of your treatment and safety. Be sure to make and go to all appointments, and call your doctor if you are having problems. It's also a good idea to know your test results and keep alist of the medicines you take. How can you care for yourself at home? Look at what you eat   Keep a food diary for a week or two and record everything you eat or drink. Track the number of servings you eat from each food group.  For a balanced diet every day, eat a variety of:  ? 6 or more ounce-equivalents of grains, such as cereals, breads, crackers, rice, or pasta, every day. An ounce-equivalent is 1 slice of bread, 1 cup of ready-to-eat cereal, or ½ cup of cooked rice, cooked pasta, or cooked cereal.  ? 2½ cups of vegetables, especially:  - Dark-green vegetables such as broccoli and spinach.  - Orange vegetables such as carrots and sweet potatoes. - Dry beans (such as carreon and kidney beans) and peas (such as lentils). ? 2 cups of fresh, frozen, or canned fruit. A small apple or 1 banana or orange equals 1 cup. ? 3 cups of nonfat or low-fat milk, yogurt, or other milk products. ? 5½ ounces of meat and beans, such as chicken, fish, lean meat, beans, nuts, and seeds.  One egg, 1 tablespoon of peanut butter, ½ ounce nuts or seeds, or ¼ cup of cooked beans equals 1 ounce of meat.  Learn how to read food labels for serving sizes and ingredients. Fast-food and convenience-food meals often contain few or no fruits or vegetables. Make sure you eat some fruits and vegetables to make the meal more nutritious.  Look at your food diary. For each food group, add up what you have eaten and then divide the total by the number of days. This will give you an idea of how much you are eating from each food group. See if you can find some ways to change your diet to make it more healthy. Start small   Do not try to make dramatic changes to your diet all at once. You might feel that you are missing out on your favorite foods and then be more likely to fail.  Start slowly, and gradually change your habits. Try some of the following:  ? Use whole wheat bread instead of white bread. ? Use nonfat or low-fat milk instead of whole milk. ? Eat brown rice instead of white rice, and eat whole wheat pasta instead of white-flour pasta. ? Try low-fat cheeses and low-fat yogurt. ? Add more fruits and vegetables to meals and have them for snacks. ? Add lettuce, tomato, cucumber, and onion to sandwiches. ? Add fruit to yogurt and cereal.  Enjoy food   You can still eat your favorite foods. You just may need to eat less of them. If your favorite foods are high in fat, salt, and sugar, limit how often you eat them, but do not cut them out entirely.  Eat a wide variety of foods. Make healthy choices when eating out   The type of restaurant you choose can help you make healthy choices. Even fast-food chains are now offering more low-fat or healthier choices on the menu.  Choose smaller portions, or take half of your meal home.  When eating out, try:  ? A veggie pizza with a whole wheat crust or grilled chicken (instead of sausage or pepperoni).   ? Pasta with roasted vegetables, grilled chicken, or marinara sauce instead of cream sauce. ? A vegetable wrap or grilled chicken wrap. ? Broiled or poached food instead of fried or breaded items. Make healthy choices easy   Buy packaged, prewashed, ready-to-eat fresh vegetables and fruits, such as baby carrots, salad mixes, and chopped or shredded broccoli and cauliflower.  Buy packaged, presliced fruits, such as melon or pineapple.  Choose 100% fruit or vegetable juice instead of soda. Limit juice intake to 4 to 6 oz (½ to ¾ cup) a day.  Blend low-fat yogurt, fruit juice, and canned or frozen fruit to make a smoothie for breakfast or a snack. Where can you learn more? Go to https://[place].[employer]. org and sign in to your tokia.lt account. Enter B358 in the simfy box to learn more about \"Eating Healthy Foods: Care Instructions. \"     If you do not have an account, please click on the \"Sign Up Now\" link. Current as of: September 8, 2021               Content Version: 13.2  © 0215-0181 [employer]. Care instructions adapted under license by [facility] ([facility]). If you have questions about a medical condition or this instruction, always ask your healthcare professional. [place] [address] any warranty or liability for your use of this information. Well Visit, Ages 25 to 48: Care Instructions  Overview     Well visits can help you stay healthy. Your doctor has checked your overall health and may have suggested ways to take good care of yourself. Your doctor also may have recommended tests. At home, you can help prevent illness withhealthy eating, regular exercise, and other steps. Follow-up care is a key part of your treatment and safety. Be sure to make and go to all appointments, and call your doctor if you are having problems. It's also a good idea to know your test results and keep alist of the medicines you take. How can you care for yourself at home?  Get screening tests that you and your doctor decide on. Screening helps find diseases before any symptoms appear.  Eat healthy foods. Choose fruits, vegetables, whole grains, protein, and low-fat dairy foods. Limit fat, especially saturated fat. Reduce salt in your diet.  Limit alcohol. If you are a man, have no more than 2 drinks a day or 14 drinks a week. If you are a woman, have no more than 1 drink a day or 7 drinks a week.  Get at least 30 minutes of physical activity on most days of the week. Walking is a good choice. You also may want to do other activities, such as running, swimming, cycling, or playing tennis or team sports. Discuss any changes in your exercise program with your doctor.  Reach and stay at a healthy weight. This will lower your risk for many problems, such as obesity, diabetes, heart disease, and high blood pressure.  Do not smoke or allow others to smoke around you. If you need help quitting, talk to your doctor about stop-smoking programs and medicines. These can increase your chances of quitting for good.  Care for your mental health. It is easy to get weighed down by worry and stress. Learn strategies to manage stress, like deep breathing and mindfulness, and stay connected with your family and community. If you find you often feel sad or hopeless, talk with your doctor. Treatment can help.  Talk to your doctor about whether you have any risk factors for sexually transmitted infections (STIs). You can help prevent STIs if you wait to have sex with a new partner (or partners) until you've each been tested for STIs. It also helps if you use condoms (male or female condoms) and if you limit your sex partners to one person who only has sex with you. Vaccines are available for some STIs, such as HPV.  Use birth control if it's important to you to prevent pregnancy. Talk with your doctor about the choices available and what might be best for you.  If you think you may have a problem with alcohol or drug use, talk to your doctor. really important. Maybe you want to quit smoking so that you can avoid future health problems. Or maybe you want to eat a healthier diet so you can lose weight. If you have high blood pressure, your reason may be clear: to lower your blood pressure. Maybe yousmoke and want to save money on cigarettes. You need to feel ready to make a change. If you don't feel ready now, that's okay. You can still be thinking and planning. When you truly want to [facility], you're ready for the next step. It's not easy to change habits--but you can do it. Taking the time to reallythink about what will motivate or inspire you will help you reach your goals. How do you set goals? Setting goals can help a lot when you're trying to make a healthy change.  Focus on small goals. This will help you reach larger goals over time. With smaller goals, you'll have success more often, which will help you stay with it. For example, your large goal may be to lose 20 pounds. Your small goal could be to lose 5.   Write down your goals. This will help you remember, and you'll have a clearer idea of what you want to achieve. Use a journal or notebook to record your goals. Hang up your plan where you will see it often as a reminder of what you're trying to do.  Make your goals specific. Specific goals help you measure your progress. For example, setting a goal to eat one extra serving of vegetables a day is better than a general goal to \"eat more vegetables. \"   Focus on one goal at a time. By doing this, you're less likely to feel overwhelmed and then give up.  When you reach a goal, reward yourself. Celebrate your new behavior and success for several days, and then think about setting your next goal.  How can you prepare for slip-ups? It's perfectly normal to try to change a habit, go along fine for a while, and then have a setback. Lots of people try and try again before they reach theirgoals.   What are the things that might cause a setback for you? If you have tried tochange a habit before, think about what helped you and what got in your way. By thinking about these barriers now, you can plan ahead for how to deal withthem if they happen. There will be times when you slip up and don't make your goal for the week. When that happens, don't get mad at yourself. Learn from the experience. Ask yourself what got in the way of reaching your goal. Positive thinking goes along way when you're making lifestyle changes. How can you get support?  Get a partner. It's motivating to know that someone is trying to make the same change that you're making, like being more active or changing your eating habits. You have someone who is counting on you to help them succeed. That person can also remind you how far you've come.  Get friends and family involved. They can exercise with you. Or they can encourage you by saying how they admire what you are doing. Family members can join you in your healthy eating efforts. Don't be afraid to tell family and friends that their encouragement makes a big difference to you.  Join a class or support group. People in these groups often have some of the same barriers you have. They can give you support when you don't feel like staying with your plan. They can boost your morale when you need a lift. Jessica Antunez also find a number of online support groups.  Encourage yourself. When you feel like giving up, don't waste energy feeling bad about yourself. Remember your reason for wanting to change, think about the progress you've made, and give yourself a pep talk and a pat on the back.  Get professional help. A dietitian can help you make your diet healthier while still allowing you to eat foods that you enjoy. A  or physical therapist can help design an exercise program that is fun and easy to stay on. A counselor, a , or your doctor can help you overcome hurdles, reduce stress, or quit smoking.   Where can you learn more? Go to https://chpepiceweb.[email or web address]. org and sign in to your SpendCrowd account. Enter K982 in the Melodigram box to learn more about \"Learning About Changing a Habit by Setting Goals. \"     If you do not have an account, please click on the \"Sign Up Now\" link. Current as of: June 16, 2021               Content Version: 13.2  © 2006-2022 [employer]. Care instructions adapted under license by [facility] ([facility]). If you have questions about a medical condition or this instruction, always ask your healthcare professional. [address] any warranty or liability for your use of this information. WELL ADULT LIFESTYLE INSTRUCTIONS:    Dez Garcia a day in the next week to spend an hour reviewing the information below then:     1) determine your health goals for the year   2) determine what changes you need to achieve those goals   3) design your daily routine, shopping habits etc to implement those changes        Default Right Action (no choices)       Make it EASY to do the RIGHT THINGS. 4) I invite you to send me your plans via [employer] so I can continue to help you       with them    Examine your lifestyle with an emphasis on BARRIERS to bad and good habits and how you can design your life to make better choices. If you want to feel better these are the FUNDAMENTAL PILLARS of Wellness:    1)  You can choose to Get 150 min/week of moderate exercise (can talk but can't        sing) or 75 min/week of vigorous exercise (can't talk). This will enhance your sense of well being (Exercise is as good as medicine for   depression.)    2)  You can choose to Get 7-9 hours of sleep per night    Detoxifies your brain, reduces risk of dementia    3)  You can choose to Strength Train 2 x a week on non-consecutive days   This will improve function and reduce risk of injury. Body weight type exercises   such as Yoga and Pilates are excellent choices.     4)  You can choose Good Nutrition. Only eat your goal weight (in lbs) x 10        calories/day and get 5 servings of Vegetables/day   Plant based diets reduce risk of heart attack/stroke and will help you feel full on   less food. Avoid highly processed foods and processed carbohydrates. 5)  You can choose Moderate alcohol intake < 1-2 drinks/day   Alcohol will disrupt your sleep and add calories to your day. 6)  You can choose to Develop a Charismatic/Supportive relationship. This will strengthen your resilience for the ups and downs. 7)  You can choose to Practice Mindfulness. An hour a day of prayer/meditation/gratitude will change your life! If you are trying to lose weight, here are some recommendations for weight loss:  Not every weight loss program is appropriate for everybody. ..  good online sources include [employer] (more social with daily check ins), [employer] (similar but less social) and Naturally slim, as well as Brand[employer]u ($1500)    The GI Diet or \"Primal diet\", Intermittent fasting can also be effective choices. If you have diabetes treated with insulin be sure to ask for specific guidance around meals. Take your desired weight in pounds and multiply by 10 and that is your average daily calorie allowance. For example if you wish to weigh 170 lb x 10 = 1700 geo/day (this is how to gradually lose the weight and maintain your desired weight). Avoid soda/coke and all \"wet carbs\" => Drink ice water instead    Drink a large glass of ice water before meals and EAT SLOWLY (talk while you eat)! Rethink your hunger => it means your losing weight. Minimize highly processed carbohydrates as they stimulate your appetite:  Specifically cut back on Bread, Rice, Pasta and Potatoes    Avoid eating calories after 6 pm        Preventing Osteoporosis: After Your Visit  Your Care Instructions  Osteoporosis means the bones are weak and thin enough that they can break easily.  The older you are, the more likely you are to get osteoporosis. But with plenty of calcium, vitamin D, and exercise, you can help prevent osteoporosis. The preteen and teen years are a key time for bone building. With the help of calcium, vitamin D, and exercise in those early years and beyond, the bones reach their peak density and strength by age 27. After age 27, your bones naturally start to thin and weaken. The stronger your bones are at around age 27, the lower your risk for osteoporosis. But no matter what your age and risk are, your bones still need calcium, vitamin D, and exercise to stay strong. Also avoid smoking, and limit alcohol. Smoking and heavy alcohol use can make your bones thinner. Talk to your doctor about any special risks you might have, such as having a close relative with osteoporosis or taking a medicine that can weaken bones. Your doctor can tell you the best ways to protect your bones from thinning. Follow-up care is a key part of your treatment and safety. Be sure to make and go to all appointments, and call your doctor if you are having problems. It's also a good idea to know your test results and keep a list of the medicines you take. How can you care for yourself at home? · Get enough calcium and vitamin D. The [place] of Medicine recommends adults younger than age 46 need 1,000 mg of calcium and 600 IU of vitamin D each day. Women ages 46 to 79 need 1,200 mg of calcium and 600 IU of vitamin D each day. Men ages 46 to 79 need 1,000 mg of calcium and 600 IU of vitamin D each day. Adults 71 and older need 1,200 mg of calcium and 800 IU of vitamin D each day. ¨ Eat foods rich in calcium, like yogurt, cheese, milk, and dark green vegetables. ¨ Eat foods rich in vitamin D, like eggs, fatty fish, cereal, and fortified milk. ¨ Get some sunshine. Your body uses sunshine to make its own vitamin D. The safest time to be out in the sun is before 10 a.m. or after 3 p.m. Avoid getting sunburned.  Sunburn can increase your risk of skin cancer. ¨ Talk to your doctor about taking a calcium plus vitamin D supplement. Ask about what type of calcium is right for you, and how much to take at a time. Adults ages 23 to 48 should not get more than 2,500 mg of calcium and 4,000 IU of vitamin D each day, whether it is from supplements and/or food. Adults ages 46 and older should not get more than 2,000 mg of calcium and 4,000 IU of vitamin D each day from supplements and/or food. · Get regular bone-building exercise. Weight-bearing and resistance exercises keep bones healthy by working the muscles and bones against gravity. Start out at an exercise level that feels right for you. Add a little at a time until you can do the following:  ¨ Do 30 minutes of weight-bearing exercise on most days of the week. Walking, jogging, stair climbing, and dancing are good choices. ¨ Do resistance exercises with weights or elastic bands 2 to 3 days a week. · Limit alcohol. Drink no more than 1 alcohol drink a day if you are a woman. Drink no more than 2 alcohol drinks a day if you are a man. · Do not smoke. Smoking can make bones thin faster. If you need help quitting, talk to your doctor about stop-smoking programs and medicines. These can increase your chances of quitting for good. When should you call for help? Watch closely for changes in your health, and be sure to contact your doctor if:  · You need help with a healthy eating plan. · You need help with an exercise plan    © 1413-9167 [employer]. Care instructions adapted under license by [facility]. This care instruction is for use with your licensed healthcare professional. If you have questions about a medical condition or this instruction, always ask your healthcare professional. [place] [address] any warranty or liability for your use of this information. Content Version: 9.4.44419;  Last Revised: June 20, 2011                  High-Fiber Diet     What Is Fiber? Dietary fiber is a form of carbohydrate found in plants that cannot be digested by humans. All plants contain fiber, including fruits, vegetables, grains, and legumes. Fiber is often classified into two categories: soluble and insoluble. · Soluble fiber draws water into the bowel and can help slow digestion. Examples of foods that are high in soluble fiber include oatmeal, oat bran, barley, legumes (eg, beans and peas), apples, and strawberries. · Insoluble fiber speeds digestion and can add bulk to the stool. Examples of foods that are high in insoluble fiber include whole-wheat products, wheat bran, cauliflower, green beans, and potatoes. Why Follow a High-Fiber Diet? A high-fiber diet is often recommended to prevent and treat constipation , hemorrhoids , diverticulitis , and irritable bowel syndrome . Eating a high-fiber diet can also help improve your cholesterol levels, lower your risk of coronary heart disease , reduce your risk of type 2 diabetes , and lower your weight. For people with type 1 or 2 diabetes, a high-fiber diet can also help stabilize blood sugar levels. How Much Fiber Should I Eat? A high-fiber diet should contain  20-35 grams  of fiber a day. This is actually the amount recommended for the general adult population; however, most Americans eat only 15 grams of fiber per day. Digestion of Fiber   Eating a higher fiber diet than usual can take some getting used to by your body's digestive system. To avoid the side effects of sudden increases in dietary fiber (eg, gas, cramping, bloating, and diarrhea), increase fiber gradually and be sure to drink plenty of fluids every day. Tips for Increasing Fiber Intake   · Whenever possible, choose whole grains over refined grains (eg, brown rice instead of white rice, whole-wheat bread instead of white bread).     · Include a variety of grains in your diet, such as wheat, rye, barley, oats, quinoa, and bulgur. · Eat more vegetarian-based meals. Here are some ideas: black bean burgers, eggplant lasagna, and veggie tofu stir-batista. · Choose high-fiber snacks, such as fruits, popcorn, whole-grain crackers, and nuts. · Make whole-grain cereal or whole-grain toast part of your daily breakfast regime. · When eating out, whether ordering a sandwich or dinner, ask for extra vegetables. · When baking, replace part of the white flour with whole-wheat flour. Whole-wheat flour is particularly easy to incorporate into a recipe. High-Fiber Diet Eating Guide   Food Category   Foods Recommended   Notes   Grains   Whole-grain breads, muffins, bagels, or david bread Rye bread Whole-wheat crackers or crisp breads Whole-grain or bran cereals Oatmeal, oat bran, or grits Wheat germ Whole-wheat pasta and brown rice   Read the ingredients list on food labels. Look for products that list \"whole\" as the first ingredient (eg, whole-wheat, whole oats). Choose cereals with at least 2 grams of fiber per serving. Vegetables   All vegetables, especially asparagus, bean sprouts, broccoli, [place] sprouts, cabbage, carrots, cauliflower, celery, corn, greens, green beans, green pepper, onions, peas, potatoes (with skin), snow peas, spinach, squash, sweet potatoes, tomatoes, zucchini   For maximum fiber intake, eat the peels of fruits and vegetablesjust be sure to wash them well first.   Fruits   All fruits, especially apples, berries, grapefruits, mangoes, nectarines, oranges, peaches, pears, dried fruits (figs, dates, prunes, raisins)   Choose raw fruits and vegetables over juice, cooked, or cannedraw fruit has more fiber. Dried fruit is also a good source of fiber. Milk   With the exception of yogurt containing inulin (a type of fiber), dairy foods provide little fiber. Add more fiber by topping your yogurt or cottage cheese with fresh fruit, whole grain or bran cereals, nuts, or seeds.    Meats and part of your treatment and safety. Be sure to make and go to all appointments, and call your doctor if you are having problems. Its also a good idea to know your test results and keep a list of the medicines you take. How can you care for yourself at home? · Think about getting shots to prevent hepatitis A and hepatitis B. These two diseases can be spread through sex. You also can get hepatitis A if you eat infected food. · Use condoms or female condoms each time and every time you have sex. · Learn the right way to use a male condom:  ¨ Condoms come in several sizes. Make sure you use the right size. A condom that is too small can break easily. A condom that is too big can slip off during sex. Use a new condom each time you have sex. ¨ Be careful not to poke a hole in the condom when you open the wrapper. ¨ Squeeze the tip of the condom to keep out air. ¨ Pull down the loose skin (foreskin) from the head of an uncircumcised penis. ¨ While squeezing the tip of the condom, unroll it all the way down to the base of the firm penis. ¨ Never use petroleum jelly (such as Vaseline), grease, hand lotion, baby oil, or anything with oil in it. These products can make holes in the condom. ¨ After sex, hold the condom on your penis as you remove your penis from your partner. This will keep semen from spilling out of the condom. · Learn to use a female condom:  ¨ You can put in a female condom up to 8 hours before sex. ¨ Squeeze the smaller ring at the closed end and insert it deep into the vagina. The larger ring at the open end should stay outside the vagina. ¨ During sex, make sure the penis goes into the condom. ¨ After the penis is removed, close the open end of the condom by twisting it. Remove the condom. · Do not use a female condom and male condom at the same time. · Do not have sex with anyone who has symptoms of an STI, such as sores on the genitals or mouth.  The herpes virus that causes cold sores can spread to and from the penis and vagina. · Do not drink a lot of alcohol or use drugs before sex. This can cause you to let down your guard and not practice safer sex. · Having one sex partner (who does not have STIs and does not have sex with anyone else) is a sure way to avoid STIs. Talk to your partner before you have sex. Find out if he or she has or is at risk for any STI. Keep in mind that a person may be able to spread an STI even if he or she does not have symptoms. You and your partner may want to get an HIV test. You should get tested again 6 months later. © 2349-7096 [employer]. Care instructions adapted under license by [facility]. This care instruction is for use with your licensed healthcare professional. If you have questions about a medical condition or this instruction, always ask your healthcare professional. [address] any warranty or liability for your use of this information. Content Version: 9.4.72569; Last Revised: January 19, 2012                  Keep Your Memory Agatha        Many factors can affect your ability to remembera hectic lifestyle, aging, stress, chronic disease, and certain medicines. But, there are steps you can take to sharpen your mind and help preserve your memory. Challenge Your Brain   Regularly challenging your mind may help keeps it in top shape. Good mental exercises include:   · Crossword puzzlesUse a dictionary if you need it; you will learn more that way. · Brainteasers Try some! · Crafts, such as wood working and sewing   · Hobbies, such as gardening and building model airplanes   · [facility] old friends or join groups to meet new ones.    · Reading   · Learning a new language   · Taking a class, whether it be art history or anupama chi   · TravelingExperience the food, history, and culture of your destination   · Learning to use a computer   · Going to museums, the theater, or thought-provoking movies   · Changing things in your daily life, such as reversing your pattern in the grocery store or brushing your teeth using your nondominant hand   Use Memory Aids   There is no need to remember every detail on your own. These memory aids can help:   · Calendars and day planners   · Electronic organizers to store all sorts of helpful informationThese devices can \"beep\" to remind you of appointments. · A book of days to record birthdays, anniversaries, and other occasions that occur on the same date every year   · Detailed \"to-do\" lists and strategically placed sticky notes   · Quick \"study\" sessionsBefore a gathering, review who will be there so their names will be fresh in your mind. · Establish routinesFor example, keep your keys, wallet, and umbrella in the same place all the time or take medicine with your 8:00 AM glass of juice   Live a Healthy Life   Many actions that will keep your body strong will do the same for your mind. For example:   Talk to Your Doctor About Herbs and Supplements    Malnutrition and vitamin deficiencies can impair your mental function. For example, vitamin B12 deficiency can cause a range of symptoms, including confusion. But, what if your nutritional needs are being met? Can herbs and supplements still offer a benefit? Researchers have investigated a range of natural remedies, such as ginkgo , ginseng , and the supplement phosphatidylserine (PS). So far, though, the evidence is inconsistent as to whether these products can improve memory or thinking. If you are interested in taking herbs and supplements, talk to your doctor first because they may interact with other medicines that you are taking. Exercise Regularly    Among the many benefits of regular exercise are increased blood flow to the brain and decreased risk of certain diseases that can interfere with memory function. One study found that even moderate exercise has a beneficial effect.  Examples of \"moderate\"

## 2022-05-19 NOTE — TELEPHONE ENCOUNTER
Pharmacy would be able to check which alternative is cheaper for her and I can prescribe it. This would be best way. Alternatives are Dulera, Symbicort, Advair. Pended it to me and I can sign it.  Thanks

## 2022-05-19 NOTE — TELEPHONE ENCOUNTER
PT CAME IN TO THE OFFICE BECAUSE BREO IS TOO EXPENSIVE WITH HER INSURANCE. SHE IS ASKING IF YOU CAN SWITCH HER TO SOMETHING ELSE.            PLEASE ADVISE

## 2022-05-19 NOTE — PROGRESS NOTES
Well Adult Note  Name: Wyatt Driscoll Date: 2022   MRN: [id number] Sex: Female   Age: 56 y.o. Ethnicity:  / [place]   : 1975 Race: Other      Chastity Carbajal is here for well adult exam.  History:       2021:   Patient is here for DM f/u. Sugars have been poorly controlled until abpaout two months ago. Is nopt using an insulin pump and her sugars are under much better control and patient has not been experiencing hyper- or hypoglycemic symptoms. Compliance with meds is good and there are no side effects. Patient exercises occasionally and tries to eat healthy. Is up to date on foot and eye exams and immunizations.     BPD and PTSD. Has been going to psychiatry. Was switched from sertraline to Abilify. IIs not on it because of insurance. Abilify 5 mg was working for her.  Symptoms now include mood swings, lack of concentration. SLeep is getting better with melatonin.     Abdominal pain and bloating. For two months has had gassiness, belching tasting like fermentation, epigastric discomfort. Has h/o chronic constipation and has been having colicky LUQ pain. Has heartburn every day. Happens after eating lunch and dinner. Has one coffee in the morning. No soda. Drinks water. Eats a lot of tomatoes and cucumbers. Drinks a lot of lemonade. Use citrus to prepare meats. Eats oranges.  If she pushes on the area it's like she is displacing air and she starts burping. Stools the last 3 days have been diarrhea. Prior to that had been hard small stools every few days. Has been trying prune juice and Miralax. Has stopped eating carbs and is eating more vegetables. Is only eating chicken, eggs or fish for protein. No bloody or black tarry stools. No unintentional weight loss.       Menses is irregular on a chronic basis. Has h/o probable PCOS. No pelvic pain outside of menses.     2022:  At last visit it was suspected that her chronic constipation was causing her left upper quadrant quadrant pain. She was placed on MiraLAX and Amitiza 24 mg once a day. Labs on 12/7/2021: AST 57 down from 185 2019, 53 2000 1879, 2018; AST 31 down from 128, 36, 73, 46. Microalbumin was elevated. LDL not at goal now 131 from 77. Hemoglobin A1c 8.4 down from 11.4. Liver ultrasound 12/23/2021 shows slightly increased hepatic echogenicity often seen with fatty infiltration.     Constipation. Persists in spite of Amitiza and MiraLAX 4 times a day.      Weight gain. 6 pounds in 6 weeks.     GRIMM. Labs improved however appears to be worsening based on scan.    05/19/2020: Obesity/DM: Consulted with bariatrics but need PA application from this writer. COntinues with diet changes. JONATHAN/SOB: Has consulted with sleep specialist and had PSG. APnea resolved. However, her sleep efficiency is 65%. PFTs show mild obstruction small airway level with positive response at midflow level. Was started on ICS/LABA which will cost $77. GERD: Well controlled with diet changes and stopped eating at night. Taking PPI PRN. Constipation: Improved with diet changes and Miralax. No bloating. HTN: BP continues to be elevated.       DM: Sees Dr. Fausto Mason. Waiting for PA from him for Humalog because Novalog does not work. Sugars are always higher than 200 and her A1C is now 9.8. GRIMM: Improving. Allergies   Allergen Reactions    Niacin And Related Swelling and Rash         Prior to Visit Medications    Medication Sig Taking? Authorizing Provider   lisinopril (PRINIVIL;ZESTRIL) 20 MG tablet Take 1 tablet by mouth daily Yes Skylar Juarez MD   ARIPiprazole (ABILIFY) 5 MG tablet Take 1 tablet by mouth daily Yes Skylar Juarez MD   atorvastatin (LIPITOR) 40 MG tablet Take 1 tablet by mouth daily Yes Skylar Juarez MD   nystatin (MYCOSTATIN) 700778 UNIT/GM ointment Apply thin layer topically 2 times daily.  Yes Skylar Juarez MD   fenofibrate (TRIGLIDE) 160 MG tablet Diagnosis Date    Abnormal Pap smear of cervix     Anxiety 7/10/2018    Anxiety     ASCUS with positive high risk HPV cervical 2018    Bilateral carpal tunnel syndrome 5/15/2019    Bipolar disorder, in partial remission, most recent episode mixed ([address] 12/3/2021    Bipolar disorder, in partial remission, most recent episode mixed ([address] 12/3/2021    Chronic constipation 12/3/2021    Colon polyp     Diabetes type 2, uncontrolled ([address]     Essential hypertension 2018    Excessive daytime sleepiness     Fibromyalgia 2018    Gastroesophageal reflux disease without esophagitis 12/3/2021    Gastroesophageal reflux disease without esophagitis 12/3/2021    Herpes     HPV in female 2018    HPV 18    Hyperlipidemia     Obesity     PTSD (post-traumatic stress disorder) 12/3/2021    PTSD (post-traumatic stress disorder) 12/3/2021    Reactive depression 2019    Sleep apnea     Snoring     Thyroid disease     Type 2 diabetes mellitus with retinopathy, with long-term current use of insulin ([address] 2015    Unrefreshed by sleep        Past Surgical History:   Procedure Laterality Date    COLONOSCOPY      COLONOSCOPY N/A 2022    COLORECTAL CANCER SCREENING, HIGH RISK performed by Ad[facility] Doan [facility] at [facility]         Family History   Problem Relation Age of Onset    Diabetes Father     High Blood Pressure Father     Retinal Detachment Father     Cancer Father         skin    Thyroid Disease Mother     Kidney Disease Sister     Inflam Bowel Dis Brother     No Known Problems Sister     Other Brother     No Known Problems Paternal Grandfather     No Known Problems Paternal Grandmother     No Known Problems Maternal Grandmother     No Known Problems Maternal Grandfather     No Known Problems Other     Breast Cancer Maternal Aunt     Colon Cancer Neg Hx     Eclampsia Neg Hx     Hypertension Neg Hx     Ovarian Cancer Neg Hx      Labor Neg Hx  Spont Abortions Neg Hx     Stroke Neg Hx        Social History     Tobacco Use    Smoking status: Never Smoker    Smokeless tobacco: Never Used   Vaping Use    Vaping Use: Never used   Substance Use Topics    Alcohol use: Yes     Comment: socially    Drug use: No       Objective   BP (!) 138/90   Pulse 79   Temp 96.5 °F (35.8 °C) (Temporal)   Ht 5' 4\" (1.626 m)   Wt 262 lb 3.2 oz (118.9 kg)   LMP 05/16/2022   SpO2 98%   BMI 45.01 kg/m²   Wt Readings from Last 3 Encounters:   05/19/22 262 lb 3.2 oz (118.9 kg)   05/12/22 266 lb (120.7 kg)   05/09/22 258 lb (117 kg)     There were no vitals filed for this visit. Physical Exam  Constitutional:       General: She is not in acute distress. Appearance: She is well-developed. She is obese. HENT:      Head: Normocephalic and atraumatic. Eyes:      General: No scleral icterus. Conjunctiva/sclera: Conjunctivae normal.   Cardiovascular:      Rate and Rhythm: Normal rate and regular rhythm. Heart sounds: Normal heart sounds. Pulmonary:      Effort: Pulmonary effort is normal. No respiratory distress. Breath sounds: No wheezing or rales. Skin:     General: Skin is warm and dry. Neurological:      Mental Status: She is alert and oriented to person, place, and time. Psychiatric:         Behavior: Behavior normal.         Thought Content: Thought content normal.         Judgment: Judgment normal.           Assessment   Plan   1. Encounter for well adult exam without abnormal findings  2. Essential hypertension  Comments: Worse, poor control; add chlorthalidone 15 mg; will benefit from chlorthalidone with continued lisinpril 20 mg  Orders:  -     lisinopril (PRINIVIL;ZESTRIL) 20 MG tablet; Take 1 tablet by mouth daily, Disp-30 tablet, R-12Normal  -     NE OFFICE/OUTPATIENT ESTABLISHED LOW MDM 20-29 MIN  -     chlorthalidone (HYGROTEN) 15 MG tablet;  Take 1 tablet by mouth daily, Disp-30 tablet, R-12Normal  -     Basic Metabolic hyperlipidemia  Orders:  -     [place] OFFICE/OUTPATIENT ESTABLISHED LOW MDM 20-29 MIN  10.  Morbid obesity due to excess calories ([facility] [address]  Comments:  stable, poor cotrol; given multiple complications of obesity (HTN, DM, GERD, sleep disorder, possible asthma, mood disorder), bariatric surgery recommended  Orders:  -     [place] OFFICE/OUTPATIENT ESTABLISHED LOW MDM 20-29 MIN         Personalized Preventive Plan   Current Health Maintenance Status  Immunization History   Administered Date(s) Administered    COVID-19, Pfizer Purple top, DILUTE for use, 12+ yrs, 30mcg/0.3mL dose 04/07/2021, 05/05/2021, 01/21/2022    HPV 9-valent Nadara Lesser) 11/19/2020    Influenza Virus Vaccine 10/09/2018, 01/16/2020, 12/14/2020    Influenza, MDCK Quadv, IM, PF (Flucelvax 2 yrs and older) 01/19/2022    Influenza, Quadv, IM, (6 mo and older Fluzone, Flulaval, Fluarix and 3 yrs and older Afluria) 02/26/2018, 10/16/2018    Pneumococcal Polysaccharide ([id number]) 02/26/2018    Tdap (Boostrix, Adacel) 01/16/2020        Health Maintenance   Topic Date Due    Hepatitis B vaccine (1 of 3 - Risk 3-dose series) Never done    Diabetic retinal exam  02/24/2019    Cervical cancer screen  12/10/2021    Pneumococcal 0-64 years Vaccine (2 - PCV) 05/06/2036 (Originally 2/26/2019)    A1C test (Diabetic or Prediabetic)  07/07/2022    Colorectal Cancer Screen  07/27/2022    Diabetic foot exam  09/23/2022    Diabetic microalbuminuria test  12/03/2022    Lipids  12/03/2022    Depression Monitoring  05/19/2023    DTaP/Tdap/Td vaccine (2 - Td or Tdap) 01/16/2030    Flu vaccine  Completed    COVID-19 Vaccine  Completed    Hepatitis C screen  Completed    HIV screen  Completed    Hepatitis A vaccine  Aged Out    Hib vaccine  Aged Out    Meningococcal (ACWY) vaccine  Aged Out     Recommendations for [employer] Due: see orders and patient instructions/AVS.    Return in about 3 months (around 8/19/2022) for DM, Mood Disorder, HTN, HLD, GERD, Obesity - OV, AND PAP appt. Obesity Counseling: Assessed behavioral health risks and factors affecting choice of behavior. Suggested weight control approaches, including dietary changes behavioral modification and follow up plan. Provided educational and support documentation. Time spent (minutes): 5  Cardiovascular Disease Risk Counseling: Assessed the patient's risk to develop cardiovascular disease and reviewed main risk factors. Reviewed steps to reduce disease risk including:   · Quitting tobacco use, reducing amount smoked, or not starting the habit  · Making healthy food choices  · Being physically active and gradualy increasing activity levels   · Reduce weight and determine a healthy BMI goal  · Monitor blood pressure and treat if higher than 140/90 mmHg  · Maintain blood total cholesterol levels under 5 mmol/l or 190 mg/dl  · Control blood glucose levels  Provided a follow up plan.   Time spent (minutes): 5

## 2022-05-26 NOTE — PROGRESS NOTES
[address] [facility],[address] INVASIVE BARIATRIC SURG  [address]  SUITE [facility] [address]  Dept: [phone]    SURGICAL WEIGHT MANAGEMENT PROGRAM  PROGRESS NOTE INITIAL EVALUATION     Patient: Christiano Junior        Service Date: 5/12/2022      HPI:     Chief Complaint   Patient presents with    Bariatric, Initial Visit       The patient is a pleasant 56y.o. year old female  with morbid obesity, who stands Height: 5' 4\" (162.6 cm) tall with a weight of Weight: 266 lb (120.7 kg) , resulting in a BMI of Body mass index is 45.66 kg/m². . The patient suffers from multiple co-morbidities as a result of morbid obesity, including: Type 2 Diabetes Mellitus, Hypertension, Obstructive Sleep Apnea treated with BiPAP/CPAP, Dyspnea on Exertion, GERD and Degenerative Joint Disease (DJD). She has suffered from obesity for many years. The patient denies  a history of myocardial infarction, deep vein thrombosis, pulmonary embolism, renal failure, hepatic failure and stroke. The patient has failed multiple attempts at non-surgical weight loss, and is now seeking surgical intervention to promote permanent and consistent weight loss. She  has chosen Sleeve Gastrectomy. She is well educated regarding it, as she has recently viewed our weight loss surgery informational seminar .      Medical History:  Past Medical History:   Diagnosis Date    Abnormal Pap smear of cervix     Anxiety 7/10/2018    Anxiety     ASCUS with positive high risk HPV cervical 12/03/2018    Bilateral carpal tunnel syndrome 5/15/2019    Bipolar disorder, in partial remission, most recent episode mixed ([address] 12/3/2021    Bipolar disorder, in partial remission, most recent episode mixed ([address] 12/3/2021    Chronic constipation 12/3/2021    Colon polyp     Diabetes type 2, uncontrolled ([address]     Essential hypertension 5/29/2018    Excessive daytime sleepiness     Fibromyalgia 5/29/2018    Gastroesophageal reflux disease without esophagitis 12/3/2021    Gastroesophageal reflux disease without esophagitis 12/3/2021    Herpes     HPV in female 2018    HPV 18    Hyperlipidemia     Obesity     PTSD (post-traumatic stress disorder) 12/3/2021    PTSD (post-traumatic stress disorder) 12/3/2021    Reactive depression 2019    Sleep apnea     Snoring     Thyroid disease     Type 2 diabetes mellitus with retinopathy, with long-term current use of insulin ([address] 2015    Unrefreshed by sleep        Surgical History:  Past Surgical History:   Procedure Laterality Date    COLONOSCOPY      COLONOSCOPY N/A 2022    COLORECTAL CANCER SCREENING, HIGH RISK performed by Jacqueline Cardoso MD at [facility]       Family History:      Problem Relation Age of Onset    Diabetes Father     High Blood Pressure Father     Retinal Detachment Father     Cancer Father         skin    Thyroid Disease Mother     Kidney Disease Sister     Inflam Bowel Dis Brother     No Known Problems Sister     Other Brother     No Known Problems Paternal Grandfather     No Known Problems Paternal Grandmother     No Known Problems Maternal Grandmother     No Known Problems Maternal Grandfather     No Known Problems Other     Breast Cancer Maternal Aunt     Colon Cancer Neg Hx     Eclampsia Neg Hx     Hypertension Neg Hx     Ovarian Cancer Neg Hx      Labor Neg Hx     Spont Abortions Neg Hx     Stroke Neg Hx        Social History:   Social History     Tobacco Use    Smoking status: Never Smoker    Smokeless tobacco: Never Used   Vaping Use    Vaping Use: Never used   Substance Use Topics    Alcohol use: Yes     Comment: socially    Drug use: No       Current Med List:  Current Outpatient Medications   Medication Sig Dispense Refill    Fluticasone furoate-vilanterol (BREO ELLIPTA) 200-25 MCG/INH AEPB inhaler Inhale 1 puff into the lungs daily 1 each 5    metFORMIN (GLUCOPHAGE) 1000 MG tablet TAKE 1 TABLET BY MOUTH TWICE DAILY WITH MEALS 180 tablet 3    docusate sodium (COLACE) 100 MG capsule Take 1 capsule by mouth 2 times daily as needed for Constipation 180 capsule 4    Accu-Chek FastClix Lancets MISC Test 4x daily 150 each 3    blood glucose test strips (ACCU-CHEK GUIDE) strip Test 4x daily 150 each 3    melatonin 3 MG TABS tablet Take 1 mg by mouth daily      vitamin D (ERGOCALCIFEROL) 1.25 MG (35271 UT) CAPS capsule Take 1 capsule by mouth once a week 12 capsule 3    aspirin 81 MG tablet Take 1 tablet by mouth daily 90 tablet 3    Insulin Pen Needle 32G X 4 MM MISC 1 each by Does not apply route 6 times daily 600 each 3    Lancets MISC 1 each by Does not apply route 3 times daily DX:E11.65, IDDM (please dispense covered brand) 300 each 3    Sodium Phosphates (FLEET) 7-19 GM/118ML Place 1 enema rectally every 2 hours as needed (constipation) 4 Bottle 0    Elastic Bandages & Supports (ABDOMINAL BINDER/ELASTIC LARGE) MISC Wear around abdomen or pelvis as needed for comfort. 1 each 1    Blood Glucose Monitoring Suppl (ONE TOUCH ULTRA 2) w/Device KIT USE AS DIRECTED TID  0    lisinopril (PRINIVIL;ZESTRIL) 20 MG tablet Take 1 tablet by mouth daily 30 tablet 12    ARIPiprazole (ABILIFY) 5 MG tablet Take 1 tablet by mouth daily 30 tablet 12    atorvastatin (LIPITOR) 40 MG tablet Take 1 tablet by mouth daily 30 tablet 12    nystatin (MYCOSTATIN) 245285 UNIT/GM ointment Apply thin layer topically 2 times daily. 30 g 1    fenofibrate (TRIGLIDE) 160 MG tablet Take 1 tablet by mouth daily 30 tablet 12    pioglitazone (ACTOS) 15 MG tablet Take 1 tablet by mouth daily 30 tablet 12    chlorthalidone (HYGROTEN) 15 MG tablet Take 1 tablet by mouth daily 30 tablet 12    insulin lispro (HUMALOG) 100 UNIT/ML injection vial Use via insulin pump max daily dose 50 units (Patient not taking: Reported on 5/12/2022) 30 mL 3     No current facility-administered medications for this visit. Allergies   Allergen Reactions    Niacin And Related Swelling and Rash       SOCIAL:      This patient is alone for the evaluation today. [] HIV Risk Factors (i.e.) intravenous drug abuser; at risk sexual behavior; received blood products    [] TB Risk Factors (i.e.) Medically underserved, institutional care, foreign born, endemic area; exposure to active case    [] Hepatitis B&C Risk Factors (i.e.) Received blood transfusion prior to 1992; recreational drug use; high risk sexual behaviors; tattoos or body piercings; contact with blood or needle sticks in the workplace    Comprehension    Ability to grasp concepts and respond to questions:   [x] High   [] Medium   [] Low    Motivation    [x] Asks Questions; eager to learn   [] Needs education   [] Extreme anxiety    [] uncooperative   [] Denies need for education    English Speaking Ability    [x] Speaks English well   [x] Reads English well   [x] Understands spoken english    [x] Understands written English   [] No need for interpretive support      [] Might benefit from interpretive support   []  required for all services     REVIEW OF SYSTEMS: (Negative unless marked otherwise)     See review of Systems scanned into media    PRESENT ILLNESS:     Weight Parameters  Weight 266 lb (120.7 kg)   Height 5' 4\" (1.626 m)   BMI Body mass index is 45.66 kg/m².    IBW     EBW               IMMUNIZATION STATUS  Immunization History   Administered Date(s) Administered    COVID-19, Pfizer Purple top, DILUTE for use, 12+ yrs, 30mcg/0.3mL dose 04/07/2021, 05/05/2021, 01/21/2022    HPV 9-valent Natan Fry) 11/19/2020    Influenza Virus Vaccine 10/09/2018, 01/16/2020, 12/14/2020    Influenza, MDCK Quadv, IM, PF (Flucelvax 2 yrs and older) 01/19/2022    Influenza, Quadv, IM, (6 mo and older Fluzone, Flulaval, Fluarix and 3 yrs and older Afluria) 02/26/2018, 10/16/2018    Pneumococcal Polysaccharide ([id number]) 02/26/2018    Tdap (Boostrix, Adacel) 01/16/2020       FALLS ASSESSMENT    [] LOW RISK FOR FALLS    [] MODERATE RISK FOR FALLS    [] Difficulty walking/selfcare    [] Falls in the past 2 months    [] Suspicion of Clinician    [] Other:      SMOKING CESSATION     [] Not needed     [] Instructed to stop smoking    [] Pamphlet community resources given     VTE SCREEN    [] Family hx DVT/PE  /   [] Personal hx of DVT/PE    [x] Denies any family or personal hx of DVT/PE    Physician Review    [x] Past medical, family, & social history reviewed and discussed with patient. Review of surgery and post-surgical changes (by surgeon for surgical patients only)    [x] Lifelong diet expectations reviewed with patient    [x] Need for lifelong vitamin supplementation reviewed with patient    PHYSICAL EXAMINATION:      BP (!) 155/86 (Site: Right Lower Arm, Position: Sitting, Cuff Size: Large Adult)   Pulse 87   Resp 20   Ht 5' 4\" (1.626 m)   Wt 266 lb (120.7 kg)   BMI 45.66 kg/m²     Constitutional:  Vital signs are normal. The patient appears well-developed   HEENT:      Head: Normocephalic. Atraumatic     Eyes: pupils are equal and reactive. No scleral icterus is present. Neck: No mass and no thyromegaly present. Cardiovascular: Normal rate, regular rhythm, S1 normal and S2 normal.  Bilateral pulses present. Pulmonary/Chest: Effort normal and breath sounds normal. No retractions. Abdominal: Soft. Normal appearance. There is no organomegaly. No tenderness. There is no rigidity, no rebound, no guarding and no Hummel's sign. Musculoskeletal:      Right lower leg: Normal. No tenderness and no edema. Left lower leg: Normal. No tenderness and no edema. Lymphadenopathy:     No cervical adenopathy, No Exrtemity Adenopathy. Neurological: The patient is alert and oriented. Moving all four extremities equally, sensation grossly intact bilateral.  Skin: Skin is warm, dry and intact. Psychiatric: The patient has a normal mood and affect.  Speech is normal and behavior is normal. Judgment and thought content normal. Cognition and memory are normal.     RECOMMENDATIONS:     We spent a great deal of time discussing the risks and benefits of Sleeve Gastrectomy, including but not limited to injury to intra-abdominal organs, breakdown of the gastric staple line, the need for re-operative therapy,  prolonged hospitalization,  mechanical ventilation,  and death. We discussed the possibility of bleeding, the need for blood transfusions, blood clots, hospital-acquired and intra-abdominal infection, anastomotic stricture, and worsening GERD. And we discussed the need for post-operative visit compliance, behavior modifications and diet changes, protein and vitamin supplementation, as well as routine scheduled and dedicated exercise. I instructed the patient to utilize the exercise log that will be given to them at their fist dietician appointment. We discussed the potential weight loss benefit of approximately 50-60% of her excess body weight at 12-18 months post-op, as well as the possibility of insufficient weight loss or weight gain after 2 years post-operative time. Discussed the risk of substance abuse and or nicotine abuse today with patient. They expressed understanding of the risks of abuse of such drugs. PLAN:       Diagnosis Orders   1. Essential hypertension     2. JONATHAN (obstructive sleep apnea)     3. Diabetes mellitus type 2 in obese ([facility] [address]     4. Morbid obesity with BMI of 45.0-49.9, adult [facility])            Initial Testing     Primary Procedure: Sleeve Gastrectomy     Other Procedures:None    Labwork: Initial Pre-surgical Lab Tests (CMP, TSH, Fasting Lipid Profile, Mg, Zinc, Vit B1 (whole blood), Vit B12, 25-OH Vit D, Fe,  Ferritin,  Folate), Urine drug and alcohol screen  and Negative serum nicotine prior to submission for pre-auth    Imaging: None    Endoscopic Studies: Upper GI Endoscopy for GERD which has been untreated.     Psychological Assessment: Psychological Evaluation and Clearance    Nutrition Assessment: Bariatric Nutrition Assessment and Clearance    Other  Consultations: Medical clearance for Type 2 diabetes and hypertension    Physician Supervised Diet and Exercise required by the patients insurance company: 6 months.       Surgical Diet requirement:  2 weeks      Final Testing  Screening Chest Xray  and EKG within 6 months of date of surgery    Labwork:  Final Lab Tests  within 3 months of date of surgery (CBC, PT/PTT, BMP)     Electronically signed by Rossy Rosas DO on 5/25/2022 at 8:37 PM

## 2022-06-02 ENCOUNTER — TELEPHONE (OUTPATIENT)
Dept: BARIATRICS/WEIGHT MGMT | Age: 47
End: 2022-06-02

## 2022-06-10 ENCOUNTER — TELEPHONE (OUTPATIENT)
Dept: BARIATRICS/WEIGHT MGMT | Age: 47
End: 2022-06-10

## 2022-06-10 ENCOUNTER — NURSE ONLY (OUTPATIENT)
Dept: BARIATRICS/WEIGHT MGMT | Age: 47
End: 2022-06-10

## 2022-06-10 VITALS — WEIGHT: 263 LBS | BODY MASS INDEX: 45.14 KG/M2

## 2022-06-10 DIAGNOSIS — E11.9 TYPE 2 DIABETES MELLITUS WITHOUT COMPLICATION, WITHOUT LONG-TERM CURRENT USE OF INSULIN (HCC): ICD-10-CM

## 2022-06-10 RX ORDER — INSULIN LISPRO 100 [IU]/ML
INJECTION, SOLUTION INTRAVENOUS; SUBCUTANEOUS
Qty: 30 ML | Refills: 3 | Status: SHIPPED | OUTPATIENT
Start: 2022-06-10 | End: 2022-09-09

## 2022-06-10 NOTE — PROGRESS NOTES
Medical Nutrition Therapy  Initial Nutrition Assessment for Metabolic/ Bariatric Surgery  Required insurance visit prior to surgery:  6  Shared with patient the importance of documenting exercise and staying at or below start weight during visits. Claudetta Leader is a 56 y.o. female with a date of birth of 1975. Weight History: Wt Readings from Last 3 Encounters:   05/19/22 262 lb 3.2 oz (118.9 kg)   05/12/22 266 lb (120.7 kg)   05/09/22 258 lb (117 kg)        How does your weight affect your daily activities?joint pain and fatigue      Do you drink alcohol? . YES, occasionally    Do you use tobacco in the form of cigarettes, cigars, chew or any vapor appliance? No    All female patients have been educated on the importance of using reliable birth control and avoiding pregnancy the first 2 years following bariatric surgery. All female patients will have a pregnancy test the morning of surgery to confirm. All patients are nicotine tested and require a negative nicotine level prior to surgery. Patient has been educated about the risks associated with substance use, as well as the risks of using nicotine and alcohol after surgery. Patient has been educated that these substances need to be avoided lifelong after surgery to reduce the risk of complications and sub-optimal weight loss. Weight History      Kiya Maxwell's highest adult weight was 300 lbs at age 40. Patient was at her highest weight for 2 years. Patient's triggers/known causes to her highest weight are lost job, working night shift, and mental health. Kiya Maxwell's lowest adult weight was 195 lbs at age 26. Patient was at her lowest weight for 5 years. The lowest weight was achieved through just what [place] Certain weighed .      Physical Activity  Do you participate in a structured exercise program, step counting or regular physical activity? no      Instructions and exercise logs were provided to patient today see goal sheet and plan. Previous weight loss attempts  Patient has participated in the following weight loss programs:   [employer], lowfat diet, metabolife or herbalife and lowcarb      Nutrition History  Have you ever been diagnosed with an eating disorder? Yes, anxiety makes me eat. Have you ever had problems tolerating a multivitamin or mineral supplement?yes, iron supple. Have you ever been diagnosed with a vitamin or mineral deficiency? Yes, vit D     Patient dines out to a sit down restaurant 0 times per week. Patient dines out to a fast food restaurant 1 times per month. Patient does have grazing. Patient does have night eating. Patient does have a history of emotional eating. Patient does have a history of  eating out of boredom. Drinks throughout the day: regular pop, coffee, fruit juice and water    24 hour recall/food frequency: has been scanned into chart unless completed below. Assessment:  Nutritional Needs:  Men: 1500kcal daily minimum     Women 1200kcal daily minimum. 60-80gm of protein daily  PES Statement:  Obesity related to a complex combination of decreased energy needs, disordered eating patterns, physical inactivity, and increased psychological/life stress as evidenced by BMI greater than 30 and inability to maintain a significant amount of weight loss through conventional weight loss interventions. Goals    All goals were planned with and agreed on by the patient. I want to improve my health because I of my T2DM. appt # NA G What is your next step? C 1 2 3 4 5 6 7 8 9     0  1 I will read the education binder provided to me and the                 0  2 I will make my pschological evaluation appoinment. 0  3 I will bring this goal card to every appointment. x 4 I will eliminate all tobacco/nicotine. x 5 I will limit alcoholic beverages to 9-5WJ per week.                 x 6 I will limit dining out to 3 times per week or less. 7 I will eliminate sugary beverages. 8 I will eliminate carbonated beverages. 9 I will eliminate drinking with a straw. 10 I will limit caffeinated beverages to 16oz daily. 0  11 I will limit log my exercise daily. 12 I will determine my calcium and mvi plan. 13 I will have 1-2 servings of lean protein present at each meal and minimeal.                 14 I will eat every 3-5 hours. 15 I will drink 64oz of fluid daily. 16 I will eat slowly during meals and snacks. 17 I will limit fluids 4oz before after and during meals. 18 I will eat protein first at all meals followed by vegetables,  Fruit and lastly whole grains. 23 My first weight neutral approach is:                 20 My second weight neutral approach is:                 21  My Thirds weight neutral approach is:                 22                  23                  24                  25                    Goals reviewed with patient as below  Do you understand your goals? Do you have the information you need to achieve your goals? Do you have any questions  right now? Plan    Exercise for Health 15 easy exercises to do at home with 6 activity logs were provided to the patient with verbal and written instructions on how to carry this out. Goal number 14 was provided to the patient on this visit please see above. Will follow up each month and provide support as patient begins to add physical activity to life style. Monitor and review goals adjust as needed. Follow up monthly supervised diet and exercise.        Zulema Quintanilla, MS, RD, LD

## 2022-06-10 NOTE — TELEPHONE ENCOUNTER
Patient came to office today for initial dietician visits. She states her pcp told her we would have an authorization from [place] within 72 hours. I asked her if she had a new insurance as we discussed in May. She states no she still has [employer]. I explained to her that [employer] does not cover bariatric surgery and she said her PCP was taking care of it because bariatric surgery is her last resort. I also explained to her that I do not want her to get stuck with bills and her driving back and forth from [facility] to [place]. Patient did pay her $100.00 program fee and seen Rohini. I called  oneal at [phone] spoke with Kayla Ca - she reviewed patients benefits and states \"bariatric\" surgery is NOT a covered benefit. [employer] does not pay for elective procedures. I explained to Kayla Ca what the patient said about speaking to PCP and doing an authorizations, and having and authorization faxed with in 72 hours. Kayla Ca went through all of the \"tabs\" at [place] looking for conversations or paperwork being sent by PCP. There has been no activity on this patients chart regarding any type of bariatric surgery and or referrals from the patient or the PCP's office. Call reference number for [place] phone call:  [id number]. Called and spoke with Matt Brown - Dr. [place] assistant. I explained to her the situation and she has not done any authorizations for the patient nor has she spoke with her. Matt Brown verbalized understanding of [place] not covering bariatric surgery. We left our conversation with I will wait until Monday to see if by chance I get anything from [place]? And will let Matt Brown know if I need her help on anything further.

## 2022-06-15 ENCOUNTER — OFFICE VISIT (OUTPATIENT)
Dept: PHYSICAL MEDICINE AND REHAB | Age: 47
End: 2022-06-15
Payer: COMMERCIAL

## 2022-06-15 VITALS
BODY MASS INDEX: 44.9 KG/M2 | DIASTOLIC BLOOD PRESSURE: 80 MMHG | HEIGHT: 64 IN | WEIGHT: 263 LBS | SYSTOLIC BLOOD PRESSURE: 120 MMHG

## 2022-06-15 DIAGNOSIS — G56.03 BILATERAL CARPAL TUNNEL SYNDROME: ICD-10-CM

## 2022-06-15 DIAGNOSIS — M25.561 CHRONIC PAIN OF BOTH KNEES: ICD-10-CM

## 2022-06-15 DIAGNOSIS — M25.562 CHRONIC PAIN OF BOTH KNEES: ICD-10-CM

## 2022-06-15 DIAGNOSIS — G89.29 CHRONIC PAIN OF BOTH KNEES: ICD-10-CM

## 2022-06-15 DIAGNOSIS — E55.9 VITAMIN D DEFICIENCY: Chronic | ICD-10-CM

## 2022-06-15 DIAGNOSIS — M79.7 FIBROMYALGIA: Primary | Chronic | ICD-10-CM

## 2022-06-15 DIAGNOSIS — Z79.899 HIGH RISK MEDICATION USE: Chronic | ICD-10-CM

## 2022-06-15 PROCEDURE — 1036F TOBACCO NON-USER: CPT | Performed by: PHYSICAL MEDICINE & REHABILITATION

## 2022-06-15 PROCEDURE — G8427 DOCREV CUR MEDS BY ELIG CLIN: HCPCS | Performed by: PHYSICAL MEDICINE & REHABILITATION

## 2022-06-15 PROCEDURE — 99214 OFFICE O/P EST MOD 30 MIN: CPT | Performed by: PHYSICAL MEDICINE & REHABILITATION

## 2022-06-15 PROCEDURE — G8417 CALC BMI ABV UP PARAM F/U: HCPCS | Performed by: PHYSICAL MEDICINE & REHABILITATION

## 2022-06-15 ASSESSMENT — ENCOUNTER SYMPTOMS
EYE PAIN: 0
ABDOMINAL PAIN: 0
CONSTIPATION: 1
SHORTNESS OF BREATH: 1
COUGH: 0
VOMITING: 0
WHEEZING: 0
DIARRHEA: 0
SORE THROAT: 0
BOWEL INCONTINENCE: 0
NAUSEA: 0
EYE REDNESS: 0
PHOTOPHOBIA: 0
STRIDOR: 0
BACK PAIN: 1
BLOOD IN STOOL: 0

## 2022-06-15 NOTE — PROGRESS NOTES
[facility], 56 y.o. female presents today with:       Back Pain Trigger point injections 4/18/22 & 5/4/22 with 80% reduction in pain lasting 3-4 weeks     Neck Pain     Hip Pain Bilateral        She had been doing well with her trigger point program for her back neck and hip pain     Back Pain  This is a chronic problem. The current episode started more than 1 year ago. The problem occurs constantly. The problem has been gradually worsening since onset. The pain is present in the sacro-iliac, lumbar spine and gluteal. The quality of the pain is described as aching. The pain is at a severity of 8/10. The pain is severe. The pain is worse during the day. The symptoms are aggravated by bending, sitting, stress, twisting and standing. Stiffness is present in the morning. Associated symptoms include weakness. Pertinent negatives include no abdominal pain, bladder incontinence, bowel incontinence, chest pain, dysuria, fever, headaches, numbness, paresis or paresthesias. Risk factors include sedentary lifestyle, lack of exercise, history of steroid use, poor posture and obesity. She has tried ice, chiropractic manipulation, heat, home exercises, analgesics, bed rest, NSAIDs and walking for the symptoms. The treatment provided mild relief. Neck Pain   Associated symptoms include weakness. Pertinent negatives include no chest pain, fever, headaches, numbness, paresis or photophobia. Hip Pain   The incident occurred more than 1 week ago. There was no injury mechanism. The pain is at a severity of 7/10. The pain has been fluctuating since onset. Pertinent negatives include no numbness. The symptoms are aggravated by movement, palpation and weight bearing. She has tried acetaminophen, ice, non-weight bearing, NSAIDs, immobilization and rest for the symptoms. The treatment provided mild relief.        Past Medical History:   Diagnosis Date    Abnormal Pap smear of cervix     Anxiety 7/10/2018    Anxiety     ASCUS with positive high risk HPV cervical 12/03/2018    Bilateral carpal tunnel syndrome 5/15/2019    Bipolar disorder, in partial remission, most recent episode mixed ([address] 12/3/2021    Bipolar disorder, in partial remission, most recent episode mixed ([address] 12/3/2021    Chronic constipation 12/3/2021    Colon polyp     Diabetes type 2, uncontrolled ([address]     Essential hypertension 5/29/2018    Excessive daytime sleepiness     Fibromyalgia 5/29/2018    Gastroesophageal reflux disease without esophagitis 12/3/2021    Gastroesophageal reflux disease without esophagitis 12/3/2021    Herpes     HPV in female 12/03/2018    HPV 18    Hyperlipidemia     Obesity     PTSD (post-traumatic stress disorder) 12/3/2021    PTSD (post-traumatic stress disorder) 12/3/2021    Reactive depression 6/5/2019    Sleep apnea     Snoring     Thyroid disease     Type 2 diabetes mellitus with retinopathy, with long-term current use of insulin ([address] 12/7/2015    Unrefreshed by sleep      Past Surgical History:   Procedure Laterality Date    COLONOSCOPY      COLONOSCOPY N/A 1/27/2022    COLORECTAL CANCER SCREENING, HIGH RISK performed by Shaina Cabral MD at [address] [place] [address] Marital status:      Spouse name: None    Number of children: None    Years of education: None    Highest education level: None   Occupational History    Occupation: [employer]   Tobacco Use    Smoking status: Never Smoker    Smokeless tobacco: Never Used   Vaping Use    Vaping Use: Never used   Substance and Sexual Activity    Alcohol use: Yes     Comment: socially    Drug use: No    Sexual activity: Yes     Partners: Male     Comment: No BC; primary infertility   Other Topics Concern    None   Social History Narrative    None     Social Determinants of Health     Financial Resource Strain: Low Risk     Difficulty of Paying Living Expenses: Not hard at all Food Insecurity: No Food Insecurity    Worried About Running Out of Food in the Last Year: Never true    Klaudia of Food in the Last Year: Never true   Transportation Needs:     Lack of Transportation (Medical): Not on file    Lack of Transportation (Non-Medical):  Not on file   Physical Activity:     Days of Exercise per Week: Not on file    Minutes of Exercise per Session: Not on file   Stress:     Feeling of Stress : Not on file   Social Connections:     Frequency of Communication with Friends and Family: Not on file    Frequency of Social Gatherings with Friends and Family: Not on file    Attends Buddhist Services: Not on file    Active Member of Clubs or Organizations: Not on file    Attends Club or Organization Meetings: Not on file    Marital Status: Not on file   Intimate Partner Violence:     Fear of Current or Ex-Partner: Not on file    Emotionally Abused: Not on file    Physically Abused: Not on file    Sexually Abused: Not on file   Housing Stability:     Unable to Pay for Housing in the Last Year: Not on file    Number of Places Lived in the Last Year: Not on file    Unstable Housing in the Last Year: Not on file     Family History   Problem Relation Age of Onset    Diabetes Father     High Blood Pressure Father     Retinal Detachment Father     Cancer Father         skin    Thyroid Disease Mother     Kidney Disease Sister     Inflam Bowel Dis Brother     No Known Problems Sister     Other Brother     No Known Problems Paternal Grandfather     No Known Problems Paternal Grandmother     No Known Problems Maternal Grandmother     No Known Problems Maternal Grandfather     No Known Problems Other     Breast Cancer Maternal Aunt     Colon Cancer Neg Hx     Eclampsia Neg Hx     Hypertension Neg Hx     Ovarian Cancer Neg Hx      Labor Neg Hx     Spont Abortions Neg Hx     Stroke Neg Hx        Allergies   Allergen Reactions    Niacin And Related Swelling and Rash       Review of Systems   Constitutional: Positive for activity change and fatigue. Negative for chills, diaphoresis and fever. HENT: Negative for congestion, ear discharge, ear pain, hearing loss, nosebleeds, sore throat and tinnitus. Eyes: Negative for photophobia, pain and redness. Respiratory: Positive for shortness of breath. Negative for cough, wheezing and stridor. Shortness of breath on exertion   Cardiovascular: Negative for chest pain, palpitations and leg swelling. Gastrointestinal: Positive for constipation. Negative for abdominal pain, blood in stool, bowel incontinence, diarrhea, nausea and vomiting. Endocrine: Negative for polydipsia. Genitourinary: Negative for bladder incontinence, dysuria, flank pain, frequency, hematuria and urgency. Musculoskeletal: Positive for back pain, gait problem, myalgias and neck pain. Skin: Negative for rash. Allergic/Immunologic: Positive for immunocompromised state. Negative for environmental allergies. Neurological: Positive for weakness. Negative for dizziness, tremors, seizures, numbness, headaches and paresthesias. Hematological: Does not bruise/bleed easily. Psychiatric/Behavioral: Negative for hallucinations and suicidal ideas. The patient is not nervous/anxious. Objective    Vitals:    06/15/22 1430   BP: 120/80   Site: Left Upper Arm   Position: Sitting   Cuff Size: Large Adult   Weight: 263 lb (119.3 kg)   Height: 5' 4\" (1.626 m)     Pain Score: SEVEN (With medication)       Physical Exam  Vitals reviewed. Constitutional:       General: She is not in acute distress. Appearance: She is well-developed. She is not ill-appearing, toxic-appearing or diaphoretic. Comments:         HENT:      Head: Normocephalic and atraumatic. Right Ear: Hearing normal.      Left Ear: Hearing normal.      Nose: Nose normal.      Mouth/Throat:      Mouth: No oral lesions. Dentition: Normal dentition.       Pharynx: No oropharyngeal exudate. Eyes:      General: No scleral icterus. Right eye: No discharge. Left eye: No discharge. Conjunctiva/sclera: Conjunctivae normal.      Right eye: No chemosis or exudate. Left eye: No chemosis or exudate. Pupils: Pupils are equal, round, and reactive to light. Neck:      Thyroid: No thyromegaly. Vascular: No JVD. Trachea: No tracheal deviation. Cardiovascular:      Pulses: No decreased pulses. Pulmonary:      Effort: Pulmonary effort is normal. No tachypnea, bradypnea, accessory muscle usage or respiratory distress. Breath sounds: Decreased breath sounds present. No wheezing. Chest:      Chest wall: No tenderness. Abdominal:      General: Bowel sounds are normal. There is no distension. Palpations: Abdomen is soft. There is no mass. Tenderness: There is no abdominal tenderness. There is no guarding or rebound. Musculoskeletal:         General: Tenderness present. Right shoulder: Normal.      Left shoulder: Normal.      Right upper arm: Normal.      Left upper arm: Normal.      Right elbow: Normal.      Left elbow: Normal.      Right forearm: Normal.      Left forearm: Normal.      Right wrist: Normal.      Left wrist: Normal.      Right hand: Normal.      Left hand: Normal.      Cervical back: Normal range of motion and neck supple. Tenderness present. No edema or rigidity. Thoracic back: Normal.      Lumbar back: Tenderness and bony tenderness present. No swelling, edema, deformity or lacerations. Decreased range of motion.       Right hip: Normal.      Left hip: Normal.      Right upper leg: Normal.      Left upper leg: Normal.      Right knee: Normal.      Left knee: Normal.      Right lower leg: Normal.      Left lower leg: Normal.      Right ankle: Normal.      Right Achilles Tendon: Normal.      Left ankle: Normal.      Left Achilles Tendon: Normal.      Right foot: Normal.      Left foot: Normal.      Comments: Tender areas are indicated by numbered spot         Lymphadenopathy:      Cervical: No cervical adenopathy. Skin:     General: Skin is warm and dry. Coloration: Skin is not pale. Findings: No abrasion, bruising, ecchymosis, erythema, laceration, petechiae or rash. Rash is not macular, pustular or urticarial.      Nails: There is no clubbing. Neurological:      Mental Status: She is alert and oriented to person, place, and time. Cranial Nerves: No cranial nerve deficit. Sensory: Sensory deficit present. Motor: No tremor, atrophy or abnormal muscle tone. Coordination: Coordination normal.      Gait: Gait abnormal.      Deep Tendon Reflexes: Reflexes abnormal. Babinski sign absent on the right side. Babinski sign absent on the left side. Psychiatric:         Attention and Perception: She is attentive. Mood and Affect: Mood is not anxious or depressed. Affect is not labile, blunt, angry or inappropriate. Speech: She is communicative. Speech is not rapid and pressured, delayed, slurred or tangential.         Behavior: Behavior normal. Behavior is not agitated, slowed, aggressive, withdrawn, hyperactive or combative. Thought Content: Thought content normal. Thought content is not paranoid or delusional. Thought content does not include homicidal or suicidal ideation. Thought content does not include homicidal or suicidal plan. Cognition and Memory: Cognition is not impaired. Memory is not impaired. She does not exhibit impaired recent memory or impaired remote memory. Judgment: Judgment normal. Judgment is not impulsive or inappropriate. Ortho Exam  Neurologic Exam     Mental Status   Oriented to person, place, and time. Speech: not slurred   Level of consciousness: alert  Knowledge: good. Cranial Nerves     CN III, IV, VI   Pupils are equal, round, and reactive to light.     Sensory Exam   Sensory deficit distribution on right: median  Sensory deficit distribution on left: median          After a thorough review and discussion of the previous medical records, patient comprehensive medical, surgical, and family and social history, Review of Systems, their OARRS, their Screener and Opioid Assessment for Patients with Pain (SOAPP®-R), recent diagnostics, and symptomatic results to previous treatment, it is my impression that the patients is suffering with progressive and severe:     Diagnosis Orders   1. Fibromyalgia  HI INJECT TRIGGER POINTS, 3 OR GREATER    HI INJECT TRIGGER POINTS, 3 OR GREATER    HI INJECT TRIGGER POINTS, 3 OR GREATER   2. Chronic pain of both knees     3. Bilateral carpal tunnel syndrome     4. High risk medication use     5.  Vitamin D deficiency         I am also concerned by lifestyle and mood issues including:    Past Medical History:   Diagnosis Date    Abnormal Pap smear of cervix     Anxiety 7/10/2018    Anxiety     ASCUS with positive high risk HPV cervical 12/03/2018    Bilateral carpal tunnel syndrome 5/15/2019    Bipolar disorder, in partial remission, most recent episode mixed ([address] 12/3/2021    Bipolar disorder, in partial remission, most recent episode mixed ([address] 12/3/2021    Chronic constipation 12/3/2021    Colon polyp     Diabetes type 2, uncontrolled ([address]     Essential hypertension 5/29/2018    Excessive daytime sleepiness     Fibromyalgia 5/29/2018    Gastroesophageal reflux disease without esophagitis 12/3/2021    Gastroesophageal reflux disease without esophagitis 12/3/2021    Herpes     HPV in female 12/03/2018    HPV 18    Hyperlipidemia     Obesity     PTSD (post-traumatic stress disorder) 12/3/2021    PTSD (post-traumatic stress disorder) 12/3/2021    Reactive depression 6/5/2019    Sleep apnea     Snoring     Thyroid disease     Type 2 diabetes mellitus with retinopathy, with long-term current use of insulin ([address] 12/7/2015    Unrefreshed by sleep            Given of treatment.;Possible medication side effects, risk of tolerance/dependence & alternative treatments discussed. Chronic Pain > 50 MEDD Obtained or confirmened \"Consent of Opioid Use\" on file.;Considered consultation with a specialist.;Re-evaluated the status of the patient's underlying condition causing pain. Chronic Pain > 80 MEDD -               Patient is currently taking:       I have discontinued [employer]. Maxwell \"Jari Maxwell\"'s Insulin Pen Needle. I am also having her maintain her ONE TOUCH ULTRA 2, Abdominal Binder/Elastic Large, Lancets, fleet, aspirin, vitamin D, melatonin, Accu-Chek FastClix Lancets, Accu-Chek Guide, docusate sodium, metFORMIN, Breo Ellipta, lisinopril, ARIPiprazole, atorvastatin, nystatin, fenofibrate, pioglitazone, chlorthalidone, and insulin lispro. I also recommend the following Medications:    No orders of the defined types were placed in this encounter. To continue vitamin D and over-the-counter Tylenol for pain. She is not interested in opiate medications or controlled medications of any kind.     -which helps with pain and function. Otherwise, continue the current pain medications that I have prescibed. Radiologic:   Old  unique films results reviewed  ,     I discussed results with patients. see Follow up plans below  For any new studies. Unique source and Care Everywhere Updates:  prior external notes requested and reviewed. No new issues noted. Unique History obtained by caregiver/independent historian-and verified with SOAPPR. Electrodiagnostic:  Previous studies requested,     I discussed results with patient. See follow-up plans for new studies.         Labs:  Previous labs reviewed     Lab Results   Component Value Date     04/07/2022    K 4.3 04/07/2022     04/07/2022    CO2 22 04/07/2022    BUN 17 04/07/2022    CREATININE 0.72 04/07/2022    CALCIUM 9.4 04/07/2022    LABALBU 4.1 12/07/2021 importance of following up with PCP and specialists for his/her chronic diseases, health, CV, and cancer screening and continued care. Will follow disease activity/progression and adjust therapeutic regimen to disease activity and severity. Discussed medication dosage, usage, goals of therapy, and side effects. Available test results were reviewed -Discussed findings, impression and plan with patient. An additional  3 minutes were spent outside of the patient visit to review records. Additional time spent with the patient to discuss their questions. Additional time spent with the patient devoted to discussing treatment strategy, planning, and implementation. Patient understands above plan; questions asked and answered. Patient agrees to plan as noted above. At least 50% of the visit was involved in the discussion of the options for treatment. We discussed exercises, medication, interventional therapies and surgery. Healthy life style is essential with patient hard work to achieve the wellness. In addition; discussion with the patient and/or family about patient's functional status any of the diagnostic results, impressions and/or recommended diagnostic studies, prognosis, risks and benefits of treatment options, instructions for treatment and/or follow-up, importance of compliance with chosen treatment options, risk-factor reduction, and patient/family education. They are to follow up in 2 1/2 -3 months to review medication, efficacy of injections, pill counts, OARRS check, high risk med review re intensive monitoring for toxicity and addiction, SOAPPR assessment, review diagnostics, to review previous and future treatment plans and assess appropriateness for continued therapy.         New Diagnostics  Orders Placed This Encounter   Procedures    IA INJECT TRIGGER POINTS, 3 OR GREATER    IA INJECT TRIGGER POINTS, 3 OR GREATER    IA INJECT TRIGGER POINTS, 3 OR GREATER       Zayra Herrera DO

## 2022-06-17 NOTE — TELEPHONE ENCOUNTER
Spoke with patient - she verbalized understanding that her insurance will not pay for the procedure. Patient is not interested in private pay. I did discuss the amounts with her so she was aware of the amounts. I did also let patient know if and when her insurance changes in the future to reach out to me to verify her benefits. Nissa, I did tell her we will refund her $100.00 program fee. Can you please help me with the refund.

## 2022-08-01 ENCOUNTER — PROCEDURE VISIT (OUTPATIENT)
Dept: PHYSICAL MEDICINE AND REHAB | Age: 47
End: 2022-08-01
Payer: COMMERCIAL

## 2022-08-01 DIAGNOSIS — M79.10 MYALGIA: Primary | ICD-10-CM

## 2022-08-01 DIAGNOSIS — M79.7 FIBROMYALGIA: Chronic | ICD-10-CM

## 2022-08-01 PROCEDURE — 20553 NJX 1/MLT TRIGGER POINTS 3/>: CPT | Performed by: PHYSICAL MEDICINE & REHABILITATION

## 2022-08-01 PROCEDURE — 96372 THER/PROPH/DIAG INJ SC/IM: CPT | Performed by: PHYSICAL MEDICINE & REHABILITATION

## 2022-08-01 RX ORDER — LIDOCAINE HYDROCHLORIDE 10 MG/ML
15 INJECTION, SOLUTION INFILTRATION; PERINEURAL ONCE
Status: COMPLETED | OUTPATIENT
Start: 2022-08-01 | End: 2022-08-01

## 2022-08-01 RX ORDER — CYANOCOBALAMIN 1000 UG/ML
1000 INJECTION INTRAMUSCULAR; SUBCUTANEOUS ONCE
Status: COMPLETED | OUTPATIENT
Start: 2022-08-01 | End: 2022-08-01

## 2022-08-01 RX ADMIN — LIDOCAINE HYDROCHLORIDE 15 ML: 10 INJECTION, SOLUTION INFILTRATION; PERINEURAL at 16:35

## 2022-08-01 RX ADMIN — CYANOCOBALAMIN 1000 MCG: 1000 INJECTION INTRAMUSCULAR; SUBCUTANEOUS at 16:35

## 2022-08-01 NOTE — PROGRESS NOTES
Patient here for trigger point injections. Patient taken to exam room and seated on draped locking stool with area to be injected exposed. Areas marked and cleansed with alcohol. 15 cc of 1% xylocaine with 1 cc of cyanocobalamin to be administered by provider.

## 2022-08-16 DIAGNOSIS — B37.2 CANDIDAL INTERTRIGO: Chronic | ICD-10-CM

## 2022-08-16 DIAGNOSIS — B00.9 HSV-2 (HERPES SIMPLEX VIRUS 2) INFECTION: Chronic | ICD-10-CM

## 2022-08-16 RX ORDER — VALACYCLOVIR HYDROCHLORIDE 1 G/1
1000 TABLET, FILM COATED ORAL 2 TIMES DAILY
Qty: 20 TABLET | Refills: 0 | Status: SHIPPED | OUTPATIENT
Start: 2022-08-16

## 2022-08-16 RX ORDER — NYSTATIN 100000 U/G
OINTMENT TOPICAL
Qty: 30 G | Refills: 0 | Status: SHIPPED | OUTPATIENT
Start: 2022-08-16 | End: 2022-09-15

## 2022-08-16 NOTE — TELEPHONE ENCOUNTER
Short term supply is requested until patient is seen. Patient requests medication for Herpes but she hasn't had it filled in years. Please advise.

## 2022-08-30 ENCOUNTER — PROCEDURE VISIT (OUTPATIENT)
Dept: PHYSICAL MEDICINE AND REHAB | Age: 47
End: 2022-08-30
Payer: COMMERCIAL

## 2022-08-30 DIAGNOSIS — G56.01 CARPAL TUNNEL SYNDROME OF RIGHT WRIST: Primary | ICD-10-CM

## 2022-08-30 PROCEDURE — 20526 THER INJECTION CARP TUNNEL: CPT | Performed by: PHYSICAL MEDICINE & REHABILITATION

## 2022-08-30 PROCEDURE — 76942 ECHO GUIDE FOR BIOPSY: CPT | Performed by: PHYSICAL MEDICINE & REHABILITATION

## 2022-08-30 RX ADMIN — LIDOCAINE HYDROCHLORIDE 1 ML: 20 INJECTION, SOLUTION INFILTRATION; PERINEURAL at 14:53

## 2022-09-06 NOTE — PROGRESS NOTES
Patient here today for a right CTS injection with US guidance. Patient taken back to exam room and placed in chair with right wrist exposed. 1 cc of 2% Lidocaine with 1 cc of kenalog to be administered by provider.

## 2022-09-07 RX ORDER — LIDOCAINE HYDROCHLORIDE 20 MG/ML
1 INJECTION, SOLUTION INFILTRATION; PERINEURAL ONCE
Status: COMPLETED | OUTPATIENT
Start: 2022-08-30 | End: 2022-08-30

## 2022-09-09 ENCOUNTER — OFFICE VISIT (OUTPATIENT)
Dept: FAMILY MEDICINE CLINIC | Age: 47
End: 2022-09-09
Payer: COMMERCIAL

## 2022-09-09 VITALS
SYSTOLIC BLOOD PRESSURE: 122 MMHG | OXYGEN SATURATION: 97 % | TEMPERATURE: 96.5 F | HEIGHT: 64 IN | WEIGHT: 266 LBS | DIASTOLIC BLOOD PRESSURE: 82 MMHG | BODY MASS INDEX: 45.41 KG/M2 | HEART RATE: 80 BPM

## 2022-09-09 DIAGNOSIS — E11.3293 TYPE 2 DIABETES MELLITUS WITH MILD NONPROLIFERATIVE RETINOPATHY OF BOTH EYES, WITH LONG-TERM CURRENT USE OF INSULIN, MACULAR EDEMA PRESENCE UNSPECIFIED (HCC): ICD-10-CM

## 2022-09-09 DIAGNOSIS — I10 ESSENTIAL HYPERTENSION: Primary | ICD-10-CM

## 2022-09-09 DIAGNOSIS — Z23 NEED FOR VACCINATION: ICD-10-CM

## 2022-09-09 DIAGNOSIS — Z12.11 SCREEN FOR COLON CANCER: ICD-10-CM

## 2022-09-09 DIAGNOSIS — Z79.4 TYPE 2 DIABETES MELLITUS WITH MILD NONPROLIFERATIVE RETINOPATHY OF BOTH EYES, WITH LONG-TERM CURRENT USE OF INSULIN, MACULAR EDEMA PRESENCE UNSPECIFIED (HCC): ICD-10-CM

## 2022-09-09 LAB — HBA1C MFR BLD: 14 %

## 2022-09-09 PROCEDURE — 3046F HEMOGLOBIN A1C LEVEL >9.0%: CPT | Performed by: FAMILY MEDICINE

## 2022-09-09 PROCEDURE — 90674 CCIIV4 VAC NO PRSV 0.5 ML IM: CPT | Performed by: FAMILY MEDICINE

## 2022-09-09 PROCEDURE — 83036 HEMOGLOBIN GLYCOSYLATED A1C: CPT | Performed by: FAMILY MEDICINE

## 2022-09-09 PROCEDURE — 90471 IMMUNIZATION ADMIN: CPT | Performed by: FAMILY MEDICINE

## 2022-09-09 PROCEDURE — 99214 OFFICE O/P EST MOD 30 MIN: CPT | Performed by: FAMILY MEDICINE

## 2022-09-09 RX ORDER — PIOGLITAZONEHYDROCHLORIDE 30 MG/1
15 TABLET ORAL DAILY
Qty: 30 TABLET | Refills: 12 | Status: SHIPPED | OUTPATIENT
Start: 2022-09-09

## 2022-09-09 NOTE — Clinical Note
Patient's insurance will not  cover insulin of supplies for pump. A1C>14 today. On metformin only. Says her appt with you in July was cancelled and she never rec'd call to reschedule.  Have requested ASAP appt with you by way of new referral.

## 2022-09-09 NOTE — PROGRESS NOTES
[facility], 56 y.o. female presents today with:  Chief Complaint   Patient presents with    3 Month Follow-Up     Wants labs to see if insulin resistance            HPI       12/03/2021:   Patient is here for DM f/u. Sugars have been poorly controlled until abpaout two months ago. Is nopt using an insulin pump and her sugars are under much better control and patient has not been experiencing hyper- or hypoglycemic symptoms. Compliance with meds is good and there are no side effects. Patient exercises occasionally and tries to eat healthy. Is up to date on foot and eye exams and immunizations. BPD and PTSD. Has been going to psychiatry. Was switched from sertraline to Abilify. IIs not on it because of insurance. Abilify 5 mg was working for her. Symptoms now include mood swings, lack of concentration. SLeep is getting better with melatonin. Abdominal pain and bloating. For two months has had gassiness, belching tasting like fermentation, epigastric discomfort. Has h/o chronic constipation and has been having colicky LUQ pain. Has heartburn every day. Happens after eating lunch and dinner. Has one coffee in the morning. No soda. Drinks water. Eats a lot of tomatoes and cucumbers. Drinks a lot of lemonade. Use citrus to prepare meats. Eats oranges. If she pushes on the area it's like she is displacing air and she starts burping. Stools the last 3 days have been diarrhea. Prior to that had been hard small stools every few days. Has been trying prune juice and Miralax. Has stopped eating carbs and is eating more vegetables. Is only eating chicken, eggs or fish for protein. No bloody or black tarry stools. No unintentional weight loss. Menses is irregular on a chronic basis. Has h/o probable PCOS. No pelvic pain outside of menses. 01/19/2022: At last visit it was suspected that her chronic constipation was causing her left upper quadrant quadrant pain.   She was placed on MiraLAX and Amitiza 24 mg once a day. Labs on 12/7/2021: AST 57 down from 185 2019, 53 2000 1879, 2018; AST 31 down from 128, 36, 73, 46. Microalbumin was elevated. LDL not at goal now 131 from 77. Hemoglobin A1c 8.4 down from 11.4. Liver ultrasound 12/23/2021 shows slightly increased hepatic echogenicity often seen with fatty infiltration. Constipation. Persists in spite of Amitiza and MiraLAX 4 times a day. Weight gain. 6 pounds in 6 weeks. GRIMM. Labs improved however appears to be worsening based on scan.     05/19/2020: Obesity/DM: Consulted with bariatrics but need PA application from this writer. COntinues with diet changes. JONATHAN/SOB: Has consulted with sleep specialist and had PSG. APnea resolved. However, her sleep efficiency is 65%. PFTs show mild obstruction small airway level with positive response at midflow level. Was started on ICS/LABA which will cost $77. GERD: Well controlled with diet changes and stopped eating at night. Taking PPI PRN. Constipation: Improved with diet changes and Miralax. No bloating. HTN: BP continues to be elevated. DM: Sees Dr. Tejal Dubon. Waiting for PA from him for Humalog because Novalog does not work. Sugars are always higher than 200 and her A1C is now 9.8. GRIMM: Improving. 09/09/2022: DM: Insurance would not pay for pump supplies or the insulin to go in the pump she already has. Has been taking metformin. She has called Dr. Zora Rodriguez office and they changed the insulin but insurance still won't pay for new insulin. On Metformin 1000 mg  TID and Actos 15 mg. Trulicity has worked for but insurance does not cover. Today's A1C>14. VVXS=933. Obesity: Started bariatrics  but [employer] cover it.        No other questions and or concerns for today's visit      Review of Systems      Past Medical History:   Diagnosis Date    Abnormal Pap smear of cervix     Anxiety 7/10/2018    Anxiety     ASCUS with positive high risk HPV cervical 12/03/2018    Bilateral carpal tunnel syndrome 5/15/2019    Bipolar disorder, in partial remission, most recent episode mixed ([address] 12/3/2021    Bipolar disorder, in partial remission, most recent episode mixed ([address] 12/3/2021    Chronic constipation 12/3/2021    Colon polyp     Diabetes type 2, uncontrolled ([address]     Essential hypertension 5/29/2018    Excessive daytime sleepiness     Fibromyalgia 5/29/2018    Gastroesophageal reflux disease without esophagitis 12/3/2021    Gastroesophageal reflux disease without esophagitis 12/3/2021    Herpes     HPV in female 12/03/2018    HPV 18    Hyperlipidemia     Obesity     PTSD (post-traumatic stress disorder) 12/3/2021    PTSD (post-traumatic stress disorder) 12/3/2021    Reactive depression 6/5/2019    Sleep apnea     Snoring     Thyroid disease     Type 2 diabetes mellitus with retinopathy, with long-term current use of insulin ([address] 12/7/2015    Unrefreshed by sleep      Past Surgical History:   Procedure Laterality Date    COLONOSCOPY      COLONOSCOPY N/A 1/27/2022    COLORECTAL CANCER SCREENING, HIGH RISK performed by Ame Greene MD at [facility] Marital status:      Spouse name: Not on file    Number of children: Not on file    Years of education: Not on file    Highest education level: Not on file   Occupational History    Occupation: [employer]   Tobacco Use    Smoking status: Never    Smokeless tobacco: Never   Vaping Use    Vaping Use: Never used   Substance and Sexual Activity    Alcohol use: Yes     Comment: socially    Drug use: No    Sexual activity: Yes     Partners: Male     Comment: No BC; primary infertility   Other Topics Concern    Not on file   Social History Narrative    Not on file     Social Determinants of Health     Financial Resource Strain: Low Risk     Difficulty of Paying Living Expenses: Not hard at all   Food Insecurity: No Food Insecurity    Worried About Running Out of Food in the Last Year: Never true    Ran Out of Food in the Last Year: Never true   Transportation Needs: Not on file   Physical Activity: Not on file   Stress: Not on file   Social Connections: Not on file   Intimate Partner Violence: Not on file   Housing Stability: Not on file     Family History   Problem Relation Age of Onset    Diabetes Father     High Blood Pressure Father     Retinal Detachment Father     Cancer Father         skin    Thyroid Disease Mother     Kidney Disease Sister     Inflam Bowel Dis Brother     No Known Problems Sister     Other Brother     No Known Problems Paternal Grandfather     No Known Problems Paternal Grandmother     No Known Problems Maternal Grandmother     No Known Problems Maternal Grandfather     No Known Problems Other     Breast Cancer Maternal Aunt     Colon Cancer Neg Hx     Eclampsia Neg Hx     Hypertension Neg Hx     Ovarian Cancer Neg Hx      Labor Neg Hx     Spont Abortions Neg Hx     Stroke Neg Hx      Allergies   Allergen Reactions    Niacin And Related Swelling and Rash     Current Outpatient Medications   Medication Sig Dispense Refill    Dulaglutide (TRULICITY) 1.82 NB/8.1JS SOPN Inject 0.75 mg into the skin once a week Lot: [id number] Exp: 2024 1 Adjustable Dose Pre-filled Pen Syringe 0    Dulaglutide (TRULICITY) 1.5 JQ/3.0MK SOPN Inject 1.5 mg into the skin once a week Lot: [id number] Exp: 2023 1 Adjustable Dose Pre-filled Pen Syringe 0    pioglitazone (ACTOS) 30 MG tablet Take 0.5 tablets by mouth daily 30 tablet 12    nystatin (MYCOSTATIN) 082701 UNIT/GM ointment Apply thin layer topically 2 times daily. 30 g 0    valACYclovir (VALTREX) 1 g tablet Take 1 tablet by mouth in the morning and 1 tablet before bedtime.  20 tablet 0    lisinopril (PRINIVIL;ZESTRIL) 20 MG tablet Take 1 tablet by mouth daily 30 tablet 12    ARIPiprazole (ABILIFY) 5 MG tablet Take 1 tablet by mouth Normocephalic. Eyes:      Conjunctiva/sclera: Conjunctivae normal.   Pulmonary:      Effort: Pulmonary effort is normal.   Neurological:      Mental Status: She is alert and oriented to person, place, and time. Psychiatric:         Behavior: Behavior normal.         Thought Content: Thought content normal.         Judgment: Judgment normal.         Lab Results   Component Value Date    LABA1C 14.0 09/09/2022    LABA1C 9.8 (H) 04/07/2022    LABA1C 8.4 (H) 12/03/2021     Lab Results   Component Value Date    LABMICR 13.60 (H) 12/03/2021    CREATININE 0.72 04/07/2022     Lab Results   Component Value Date    ALT 57 (H) 12/07/2021    AST 31 12/07/2021     Lab Results   Component Value Date    CHOL 199 03/10/2018    TRIG 163 03/10/2018    HDL 31 (L) 12/03/2021    LDLCALC 131 (H) 12/03/2021        Assessment & Plan   Visit Diagnoses and Associated Orders       Essential hypertension    -  Primary    Stable and well-controlled on current meds         Type 2 diabetes mellitus with mild nonproliferative retinopathy of both eyes, with long-term current use of insulin, macular edema presence unspecified (HCC)        worse, poor-control; referred back to Dr. Tejal Dubon. Trulicity sample 8.05 x 2wks then 1.5 x 2 wks provided. Increased Actos to 30mg    POCT glycosylated hemoglobin (Hb A1C) [91533 Custom]      Ekaterina Sales MD, Endocrinology, [facility] [[id number] Custom]      pioglitazone (ACTOS) 30 MG tablet [24155]      Dulaglutide (TRULICITY) 8.72 YR/4.5BK SOPN [953270]      Dulaglutide (TRULICITY) 1.5 NW/3.0GK SOPN [558706]           Need for vaccination        Influenza, FLUCELVAX, (age 10 mo+), IM, Preservative Free, 0.5 mL [85662 Custom]           Screen for colon cancer        Fecal DNA Colorectal cancer screening (Cologuard) [67881 CPT(R)]                     Reviewed with the patient: all disease processes, current clinical status, medications, activities and diet.       Side effects, adverse effects of the medication prescribed today, as well as treatment plan/ rationale and result expectations have been discussed with the patient who expresses understanding and desires to proceed. Close follow up to evaluate treatment results and for coordination of care. I have reviewed the patient's medical history in detail and updated the computerized patient record. More than 50% of the appointment was spent in face-to-face counseling, education and care coordination. Orders Placed This Encounter   Procedures    Fecal DNA Colorectal cancer screening (Cologuard)    Influenza, FLUCELVAX, (age 10 mo+), IM, Preservative Free, 0.5 mL   Pamela Hawley MD, Endocrinology, [place]     Referral Priority:   Urgent     Referral Type:   Eval and Treat     Referral Reason:   Specialty Services Required     Referred to Provider:   Mary Ellen Silva MD     Requested Specialty:   Endocrinology     Number of Visits Requested:   1    POCT glycosylated hemoglobin (Hb A1C)     Orders Placed This Encounter   Medications    pioglitazone (ACTOS) 30 MG tablet     Sig: Take 0.5 tablets by mouth daily     Dispense:  30 tablet     Refill:  12    Dulaglutide (TRULICITY) 6.38 TS/4.5IE SOPN     Sig: Inject 0.75 mg into the skin once a week Lot: [id number] Exp: 1-     Dispense:  1 Adjustable Dose Pre-filled Pen Syringe     Refill:  0    Dulaglutide (TRULICITY) 1.5 FK/7.4PQ SOPN     Sig: Inject 1.5 mg into the skin once a week Lot: [id number] Exp: 9/9/2023     Dispense:  1 Adjustable Dose Pre-filled Pen Syringe     Refill:  0     Medications Discontinued During This Encounter   Medication Reason    insulin lispro (INSULIN LISPRO) 100 UNIT/ML SOLN injection vial Cost of medication    pioglitazone (ACTOS) 15 MG tablet REORDER     Return in about 3 months (around 12/9/2022).         Controlled Substance Monitoring:    Acute and Chronic Pain Monitoring:   RX Monitoring 5/15/2019   Attestation The Prescription Monitoring Report for this patient was reviewed today. Periodic Controlled Substance Monitoring No signs of potential drug abuse or diversion identified: otherwise, see note documentation;Obtaining appropriate analgesic effect of treatment.;Possible medication side effects, risk of tolerance/dependence & alternative treatments discussed. Chronic Pain > 50 MEDD Obtained or confirmened \"Consent of Opioid Use\" on file.;Considered consultation with a specialist.;Re-evaluated the status of the patient's underlying condition causing pain.    Chronic Pain > 80 MEDD -           Celeste PADILLA MD

## 2022-09-11 RX ORDER — DULAGLUTIDE 0.75 MG/.5ML
0.75 INJECTION, SOLUTION SUBCUTANEOUS WEEKLY
Qty: 1 ADJUSTABLE DOSE PRE-FILLED PEN SYRINGE | Refills: 0 | COMMUNITY
Start: 2022-09-11

## 2022-09-11 RX ORDER — DULAGLUTIDE 1.5 MG/.5ML
1.5 INJECTION, SOLUTION SUBCUTANEOUS WEEKLY
Qty: 1 ADJUSTABLE DOSE PRE-FILLED PEN SYRINGE | Refills: 0 | COMMUNITY
Start: 2022-09-11

## 2022-10-06 ENCOUNTER — TELEMEDICINE (OUTPATIENT)
Dept: PHYSICAL MEDICINE AND REHAB | Age: 47
End: 2022-10-06

## 2022-10-06 DIAGNOSIS — M25.561 CHRONIC PAIN OF BOTH KNEES: Primary | ICD-10-CM

## 2022-10-06 DIAGNOSIS — M79.7 FIBROMYALGIA: Chronic | ICD-10-CM

## 2022-10-06 DIAGNOSIS — G56.03 BILATERAL CARPAL TUNNEL SYNDROME: ICD-10-CM

## 2022-10-06 DIAGNOSIS — K59.09 CHRONIC CONSTIPATION: Chronic | ICD-10-CM

## 2022-10-06 DIAGNOSIS — M25.562 CHRONIC PAIN OF BOTH KNEES: Primary | ICD-10-CM

## 2022-10-06 DIAGNOSIS — G89.29 CHRONIC PAIN OF BOTH KNEES: Primary | ICD-10-CM

## 2022-10-06 ASSESSMENT — ENCOUNTER SYMPTOMS
BLOOD IN STOOL: 0
SORE THROAT: 0
CONSTIPATION: 1
BACK PAIN: 1
STRIDOR: 0
COUGH: 0
DIARRHEA: 0
ABDOMINAL PAIN: 0
EYE REDNESS: 0
SHORTNESS OF BREATH: 1
WHEEZING: 0
VOMITING: 0
BOWEL INCONTINENCE: 0
EYE PAIN: 0
PHOTOPHOBIA: 0
NAUSEA: 0

## 2022-10-06 NOTE — PROGRESS NOTES
TELEHEALTH EVALUATION -- Audio/Visual (During QMTRV-99 public health emergency)    Due to COVID 19 outbreak, patient's office visit was converted to a virtual visit. Patient was contacted and agreed to proceed with a virtual visit via  [email or web address]. me   The risks and benefits of converting to a virtual visit were discussed in light of the current infectious disease epidemic. Patient also understood that insurance coverage and co-pays are up to their individual insurance plans. Subjective       This patient has requested an audio/video evaluation for the following concern(s):    HPI:       Back Pain TP injection 08/01/22 to upper and lower back gave 99.9% reduction in pain and still ongoing. Neck Pain        Wrist Pain Rt wrist injection 08/30/22 gave 99.9% reduction in pain and still ongoing       Hip Pain bilateral       Pain 7 out of 10 in hips without medication      Gets very good relief from her trigger point injections and minimize her reliance on oral medications for pain and improves her functional abilities and her interactions with friends and family. He is hoping to get them authorized again. She had been doing well with her trigger point program for her back neck and hip pain     Back Pain  This is a chronic problem. The current episode started more than 1 year ago. The problem occurs constantly. The problem has been gradually worsening since onset. The pain is present in the sacro-iliac, lumbar spine and gluteal. The quality of the pain is described as aching. The pain is at a severity of 8/10. The pain is severe. The pain is Worse during the day. The symptoms are aggravated by bending, sitting, stress, twisting and standing. Stiffness is present In the morning. Associated symptoms include weakness. Pertinent negatives include no abdominal pain, bladder incontinence, bowel incontinence, chest pain, dysuria, fever, headaches, numbness, paresis or paresthesias.  Risk factors include sedentary lifestyle, lack of exercise, history of steroid use, poor posture and obesity. She has tried ice, chiropractic manipulation, heat, home exercises, analgesics, bed rest, NSAIDs and walking for the symptoms. The treatment provided mild relief. Neck Pain   Associated symptoms include weakness. Pertinent negatives include no chest pain, fever, headaches, numbness, paresis or photophobia. Wrist Pain   Pertinent negatives include no fever or numbness. Hip Pain   The incident occurred more than 1 week ago. There was no injury mechanism. The pain is at a severity of 7/10. The pain has been Fluctuating since onset. Pertinent negatives include no numbness. The symptoms are aggravated by movement, palpation and weight bearing. She has tried acetaminophen, ice, non-weight bearing, NSAIDs, immobilization and rest for the symptoms. The treatment provided mild relief. Pain  Associated symptoms include fatigue, myalgias, neck pain and weakness. Pertinent negatives include no abdominal pain, chest pain, chills, congestion, coughing, diaphoresis, fever, headaches, nausea, numbness, rash, sore throat or vomiting.            Past Medical History:   Diagnosis Date    Abnormal Pap smear of cervix     Anxiety 7/10/2018    Anxiety     ASCUS with positive high risk HPV cervical 12/03/2018    Bilateral carpal tunnel syndrome 5/15/2019    Bipolar disorder, in partial remission, most recent episode mixed ([address] 12/3/2021    Bipolar disorder, in partial remission, most recent episode mixed ([address] 12/3/2021    Chronic constipation 12/3/2021    Colon polyp     Diabetes type 2, uncontrolled     Essential hypertension 5/29/2018    Excessive daytime sleepiness     Fibromyalgia 5/29/2018    Gastroesophageal reflux disease without esophagitis 12/3/2021    Gastroesophageal reflux disease without esophagitis 12/3/2021    Herpes     HPV in female 12/03/2018    HPV 18    Hyperlipidemia     Obesity     PTSD (post-traumatic stress disorder) 12/3/2021    PTSD (post-traumatic stress disorder) 12/3/2021    Reactive depression 2019    Sleep apnea     Snoring     Thyroid disease     Type 2 diabetes mellitus with retinopathy, with long-term current use of insulin ([address] 2015    Unrefreshed by sleep        Past Surgical History:   Procedure Laterality Date    COLONOSCOPY      COLONOSCOPY N/A 2022    COLORECTAL CANCER SCREENING, HIGH RISK performed by Dina Dao MD at [place] [id number] History    Marital status:      Spouse name: None    Number of children: None    Years of education: None    Highest education level: None   Occupational History    Occupation: [employer]   Tobacco Use    Smoking status: Never    Smokeless tobacco: Never   Vaping Use    Vaping Use: Never used   Substance and Sexual Activity    Alcohol use: Yes     Comment: socially    Drug use: No    Sexual activity: Yes     Partners: Male     Comment: No BC; primary infertility     Social Determinants of Health     Financial Resource Strain: Low Risk     Difficulty of Paying Living Expenses: Not hard at all   Food Insecurity: No Food Insecurity    Worried About Running Out of Food in the Last Year: Never true    Ran Out of Food in the Last Year: Never true       Family History   Problem Relation Age of Onset    Diabetes Father     High Blood Pressure Father     Retinal Detachment Father     Cancer Father         skin    Thyroid Disease Mother     Kidney Disease Sister     Inflam Bowel Dis Brother     No Known Problems Sister     Other Brother     No Known Problems Paternal Grandfather     No Known Problems Paternal Grandmother     No Known Problems Maternal Grandmother     No Known Problems Maternal Grandfather     No Known Problems Other     Breast Cancer Maternal Aunt     Colon Cancer Neg Hx     Eclampsia Neg Hx     Hypertension Neg Hx     Ovarian Cancer Neg Hx      Labor Neg Hx     Spont Abortions Neg Hx     Stroke Neg Hx        Allergies   Allergen Reactions    Niacin And Related Swelling and Rash       Review of Systems   Constitutional:  Positive for activity change and fatigue. Negative for chills, diaphoresis and fever. HENT:  Negative for congestion, ear discharge, ear pain, hearing loss, nosebleeds, sore throat and tinnitus. Eyes:  Negative for photophobia, pain and redness. Respiratory:  Positive for shortness of breath. Negative for cough, wheezing and stridor. Shortness of breath on exertion   Cardiovascular:  Negative for chest pain, palpitations and leg swelling. Gastrointestinal:  Positive for constipation. Negative for abdominal pain, blood in stool, bowel incontinence, diarrhea, nausea and vomiting. Endocrine: Negative for polydipsia. Genitourinary:  Negative for bladder incontinence, dysuria, flank pain, frequency, hematuria and urgency. Musculoskeletal:  Positive for back pain, gait problem, myalgias and neck pain. Skin:  Negative for rash. Allergic/Immunologic: Positive for immunocompromised state. Negative for environmental allergies. Neurological:  Positive for weakness. Negative for dizziness, tremors, seizures, numbness, headaches and paresthesias. Hematological:  Does not bruise/bleed easily. Psychiatric/Behavioral:  Negative for hallucinations and suicidal ideas. The patient is not nervous/anxious. Objective    There were no vitals filed for this visit.     No data recorded            PHYSICAL EXAMINATION:  [ INSTRUCTIONS:  \"[x]\" Indicates a positive item  \"[]\" Indicates a negative item  -- DELETE ALL ITEMS NOT EXAMINED]    [x] Alert  [x] Oriented to person/place/time      [x] No apparent distress  [x] Not toxic appearing    [x] Face complexion normal appearing [x] Sclera clear  [x] Lips are not cyanotic      [x] Breathing appears normal  [] Appears tachypneic      [] Rash on visible skin    [x] Cranial Nerves II-XII grossly intact    [x] Motor grossly intact in visible upper extremities, including stiff but intact range of motion in cervical, upper extremity and thoracic  [x] Motor grossly intact in visible lower extremities, including normal range of motion in the hips and knees for stiffness in the lumbar spine    [x] Normal Mood  [x] Not anxious appearing    [x] Not depressed appearing  [x] Not confused appearing      [x]  Good short term memory  [x]  Good long term memory    [x]  Able to follow 2-3 step commands    Due to this being a TeleHealth encounter, evaluation of the following organ systems is limited: Vitals/Constitutional/EENT/Resp/CV/GI//MS/Neuro/Skin/Heme-Lymph-Imm. ASSESSMENT/PLAN:     After a thorough review and discussion of the previous medical records, patient comprehensive medical, surgical, and family and social history, Review of Systems, their OARRS, their Screener and Opioid Assessment for Patients with Pain (SOAPP®-R), recent diagnostics, and symptomatic results to previous treatment, it is my impression that the patients is suffering with progressive and severe:     Diagnosis Orders   1. Chronic pain of both knees        2. Fibromyalgia  OR INJECT TRIGGER POINTS, 3 OR GREATER      3. Bilateral carpal tunnel syndrome        4.  Chronic constipation            I am also concerned by lifestyle and mood issues including:    Past Medical History:   Diagnosis Date    Abnormal Pap smear of cervix     Anxiety 7/10/2018    Anxiety     ASCUS with positive high risk HPV cervical 12/03/2018    Bilateral carpal tunnel syndrome 5/15/2019    Bipolar disorder, in partial remission, most recent episode mixed ([address] 12/3/2021    Bipolar disorder, in partial remission, most recent episode mixed ([address] 12/3/2021    Chronic constipation 12/3/2021    Colon polyp     Diabetes type 2, uncontrolled     Essential hypertension 5/29/2018    Excessive daytime sleepiness     Fibromyalgia 5/29/2018    Gastroesophageal reflux disease without esophagitis 12/3/2021    Gastroesophageal reflux disease without esophagitis 12/3/2021    Herpes     HPV in female 12/03/2018    HPV 18    Hyperlipidemia     Obesity     PTSD (post-traumatic stress disorder) 12/3/2021    PTSD (post-traumatic stress disorder) 12/3/2021    Reactive depression 6/5/2019    Sleep apnea     Snoring     Thyroid disease     Type 2 diabetes mellitus with retinopathy, with long-term current use of insulin ([facility] [address] 12/7/2015    Unrefreshed by sleep            Given their medication, chronic pain and lifestyle and medications they are at risk for :    Falls, constipation, addiction, toxicity due to controlled/opiate medications  Loss of livelyhood due to severe pain, debility, weight gain and  vitamin D deficiency    The patient was educated regarding proper diet, fitness routine, and regulatory restrictions concerning pain medications. Previous notes, comprehensive past medical, surgical, family history, and diagnostics were reviewed. Patient education and councelling were provided regarding off label use,treatment options and medication and injection risks. Overall greater than 25 minutes spent in direct and indirect patient care. Current and old OARRS ([place] Automated Prescription Reporting System) records reviewed, all refills reviewed since last visit,  Behavioral agreement/RAMON regulations   and Toxicology screen was reviewed with patient and is up to date. There are no current red flags. They are making good progress regarding pain relief, they are performing at a functional level regarding activities of daily living, family interactions and psychological functioning, they're not having any adverse effects or side effects from the current medications, and I see no findings of aberrant drug taking or addiction related behaviors. The patient is aware that they have a chronic pain condition and they may require opiates dosing for life. All efforts will be made to wean to the lowest effective dose.   Other therapies for pain have not been effective including nonopiate medications. Injections and exercises are only partially effective. A Rx for Narcan was offered to help prevent accidental overdose. RX Monitoring 5/15/2019   Attestation The Prescription Monitoring Report for this patient was reviewed today. Periodic Controlled Substance Monitoring No signs of potential drug abuse or diversion identified: otherwise, see note documentation;Obtaining appropriate analgesic effect of treatment.;Possible medication side effects, risk of tolerance/dependence & alternative treatments discussed. Chronic Pain > 50 MEDD Obtained or confirmened \"Consent of Opioid Use\" on file.;Considered consultation with a specialist.;Re-evaluated the status of the patient's underlying condition causing pain. Chronic Pain > 80 MEDD -               Patient is currently taking:       I am having Salbador Fraction. Casey Fifi Maxwell\" maintain her ONE TOUCH ULTRA 2, Abdominal Binder/Elastic Large, Lancets, fleet, aspirin, vitamin D, melatonin, Accu-Chek FastClix Lancets, Accu-Chek Guide, docusate sodium, metFORMIN, Breo Ellipta, lisinopril, ARIPiprazole, atorvastatin, fenofibrate, chlorthalidone, valACYclovir, pioglitazone, Trulicity, and Trulicity. I also recommend the following Medications:    No orders of the defined types were placed in this encounter. Vitamin B12, vitamin D and over-the-counter medications for pain     -which helps with pain and function. Otherwise, continue the current pain medications that I have prescibed. Radiologic:   Old films reviewed,     I discussed results with patients. see Follow up plans below  For any new studies. Care Everywhere Updates:  requested and reviewed. PCP--she is switching to [facility] leaving. No new issues noted.             Labs:  Previous labs reviewed     Lab Results   Component Value Date/Time     04/07/2022 07:15 AM    K 4.3 04/07/2022 07:15 AM medications, controlled substances improve   function and ease physical and emotional suffering we will add monthly trigger point injections to include spinal muscles of the thoracic lumbar upper and lower extremity at buttocks and shoulder areas to relieve his myofascial pain myalgia for diagnosis of myalgia. The medication would be 1% lidocaine approximately half cc to 1 cc in each trigger point throughout those regions. The patient has done physical therapy in the past that we will continue it and repeated in the future. Patient has had good results in the past with medication with 60 to 80% pain relief lasting at least 2 to 3 weeks more often than a month of relief. It has worked well to ease myofascial pain emotional suffering and improve function making it possible for him to participate in activities of daily living, self-care, interactions with friends and family. Patient gave verbal consent to ordered injections. See follow-up plans for planned injections. See follow-up plans for planned injections. Supplements:  Vitamin D with increased dosing during the rainy months, add co-Q10 for heart health. Education was given on:   Dietary and Fitness--daily stretches and low carb diet-in chair Yoga when possible      Note controlled medication/opiate drug therapy requires intensive monitoring for toxicity and addiction. Follow up with Primary Care Physician regarding their general medical needs. Stressed the importance of following up with PCP and specialists for his/her chronic diseases, health, CV, and cancer screening and continued care. Will follow disease activity/progression and adjust therapeutic regimen to disease activity and severity. Discussed medication dosage, usage, goals of therapy, and side effects. Available test results were reviewed -Discussed findings, impression and plan with patient.     An additional  minutes were spent outside of the patient visit to review records. Additional time spent with the patient to discuss their questions. Additional time spent with the patient devoted to discussing treatment strategy, planning, and implementation generalized musculoskeletal health plan. Patient understands above plan; questions asked and answered. Patient agrees to plan as noted above. On this date  10/6/22 I have spent  10 minutes reviewing previous notes, test results and face to face (virtual) with the patient discussing the diagnosis and importance of compliance with the treatment plan as well as documenting on the day of the visit. At least 50% of the visit was involved in the discussion of the options for treatment. We discussed exercises, medication, interventional therapies and surgery. Healthy life style is essential with patient hard work to achieve the wellness. In addition; discussion with the patient and/or family about any of the diagnostic results, impressions and/or recommended diagnostic studies, prognosis, risks and benefits of treatment options, instructions for treatment and/or follow-up, importance of compliance with chosen treatment options, risk-factor reduction, and patient/family education. They are to follow up in 2-2 1/2 months to review medication, efficacy of injections, pill counts, OARRS check, SOAPPR assessment, review diagnostics, to review previous and future treatment plans and assess appropriateness for continued therapy,  and regarding the efficacy of current plan and future treatment. New Diagnostics  Orders Placed This Encounter   Procedures    NH INJECT TRIGGER POINTS, 3 OR GREATER             An  electronic signature was used to authenticate this note.     -Dr Avinash Garcia, DO       With MA assistance from:   Benito Kessler MA     [id number]}    Pursuant to the emergency declaration under the [facility] waiver authority and the [employer] and Response Supplemental Appropriations Act, this Virtual  Visit was conducted, with patient's consent, to reduce the patient's risk of exposure to COVID-19 and provide continuity of care for an established patient. Services were provided through a video synchronous discussion virtually to substitute for in-person clinic visit.

## 2022-10-12 ENCOUNTER — PROCEDURE VISIT (OUTPATIENT)
Dept: PHYSICAL MEDICINE AND REHAB | Age: 47
End: 2022-10-12
Payer: COMMERCIAL

## 2022-10-12 DIAGNOSIS — M79.7 FIBROMYALGIA: Primary | Chronic | ICD-10-CM

## 2022-10-12 DIAGNOSIS — M79.10 MYALGIA: ICD-10-CM

## 2022-10-12 PROCEDURE — 96372 THER/PROPH/DIAG INJ SC/IM: CPT | Performed by: PHYSICAL MEDICINE & REHABILITATION

## 2022-10-12 PROCEDURE — 20553 NJX 1/MLT TRIGGER POINTS 3/>: CPT | Performed by: PHYSICAL MEDICINE & REHABILITATION

## 2022-10-12 RX ORDER — LIDOCAINE HYDROCHLORIDE 10 MG/ML
15 INJECTION, SOLUTION INFILTRATION; PERINEURAL ONCE
Status: COMPLETED | OUTPATIENT
Start: 2022-10-12 | End: 2022-10-12

## 2022-10-12 RX ORDER — CYANOCOBALAMIN 1000 UG/ML
1000 INJECTION INTRAMUSCULAR; SUBCUTANEOUS ONCE
Status: COMPLETED | OUTPATIENT
Start: 2022-10-12 | End: 2022-10-12

## 2022-10-12 RX ADMIN — CYANOCOBALAMIN 1000 MCG: 1000 INJECTION INTRAMUSCULAR; SUBCUTANEOUS at 16:01

## 2022-10-12 RX ADMIN — LIDOCAINE HYDROCHLORIDE 15 ML: 10 INJECTION, SOLUTION INFILTRATION; PERINEURAL at 16:02

## 2022-10-12 NOTE — PROGRESS NOTES
Patient here for trigger point injections. Patient taken to exam room and seated on draped locking stool with area to be injected exposed, marked appropriately, and cleansed with alcohol. 15 cc of 1% xylocaine with 1 cc of cyanocobalamin to be administered by provider.

## 2022-10-13 ENCOUNTER — TELEPHONE (OUTPATIENT)
Dept: ENDOCRINOLOGY | Age: 47
End: 2022-10-13

## 2022-10-13 NOTE — TELEPHONE ENCOUNTER
Patient is calling because she needs prior authorization for the Humalog. She is not taking the Novolog anymore, and that is way they denied this before. Please start a new one for her. Thanks!

## 2022-10-19 RX ORDER — INSULIN ASPART 100 [IU]/ML
INJECTION, SOLUTION INTRAVENOUS; SUBCUTANEOUS
Qty: 50 ML | Refills: 3 | Status: SHIPPED | OUTPATIENT
Start: 2022-10-19

## 2022-11-15 ENCOUNTER — TELEPHONE (OUTPATIENT)
Dept: ENDOCRINOLOGY | Age: 47
End: 2022-11-15

## 2022-11-15 DIAGNOSIS — Z46.81 INSULIN PUMP TITRATION: ICD-10-CM

## 2022-11-15 DIAGNOSIS — E11.9 TYPE 2 DIABETES MELLITUS WITHOUT COMPLICATION, WITHOUT LONG-TERM CURRENT USE OF INSULIN (HCC): Primary | ICD-10-CM

## 2022-11-15 NOTE — TELEPHONE ENCOUNTER
Pharmacy called pts insurance does not cover humalog so we changed it to novolog.   Per pt novolog does not work so she wants a PA done for [employer]

## 2022-12-19 ENCOUNTER — PROCEDURE VISIT (OUTPATIENT)
Dept: PHYSICAL MEDICINE AND REHAB | Age: 47
End: 2022-12-19

## 2022-12-19 DIAGNOSIS — M79.7 FIBROMYALGIA: Primary | ICD-10-CM

## 2022-12-19 RX ORDER — CYANOCOBALAMIN 1000 UG/ML
1000 INJECTION, SOLUTION INTRAMUSCULAR; SUBCUTANEOUS ONCE
Status: COMPLETED | OUTPATIENT
Start: 2022-12-19 | End: 2022-12-19

## 2022-12-19 RX ORDER — LIDOCAINE HYDROCHLORIDE 10 MG/ML
16 INJECTION, SOLUTION INFILTRATION; PERINEURAL ONCE
Status: COMPLETED | OUTPATIENT
Start: 2022-12-19 | End: 2022-12-19

## 2022-12-19 RX ADMIN — LIDOCAINE HYDROCHLORIDE 15 ML: 10 INJECTION, SOLUTION INFILTRATION; PERINEURAL at 15:23

## 2022-12-19 RX ADMIN — CYANOCOBALAMIN 1000 MCG: 1000 INJECTION, SOLUTION INTRAMUSCULAR; SUBCUTANEOUS at 15:22

## 2022-12-19 ASSESSMENT — ENCOUNTER SYMPTOMS
BOWEL INCONTINENCE: 0
ABDOMINAL PAIN: 0
PHOTOPHOBIA: 0
BACK PAIN: 1

## 2022-12-19 NOTE — PROGRESS NOTES
TELEHEALTH EVALUATION -- Audio/Visual (During QENZY-92 public health emergency)    Due to COVID 19 outbreak, patient's office visit was converted to a virtual visit. Patient was contacted and agreed to proceed with a virtual visit via  Doxy. me   The risks and benefits of converting to a virtual visit were discussed in light of the current infectious disease epidemic. Patient also understood that insurance coverage and co-pays are up to their individual insurance plans. Subjective       This patient has requested an audio/video evaluation for the following concern(s):    HPI:       Back Pain Trigger point injections 10/12/22 & 12/19/22 with 99% reduction in pain and ongoing        Neck Pain        Wrist Pain Right       Hip Pain Bilateral       Pain 6- Without medication (Back)      Back is flared up dt twisting injury--awaits TPs. She had been doing well with her trigger point program for her back neck and hip pain     Back Pain  This is a chronic problem. The current episode started more than 1 year ago. The problem occurs constantly. The problem has been gradually worsening since onset. The pain is present in the sacro-iliac, lumbar spine and gluteal. The quality of the pain is described as aching. The pain is at a severity of 8/10. The pain is severe. The pain is Worse during the day. The symptoms are aggravated by bending, sitting, stress, twisting and standing. Stiffness is present In the morning. Associated symptoms include weakness. Pertinent negatives include no abdominal pain, bladder incontinence, bowel incontinence, chest pain, dysuria, fever, headaches, numbness, paresis or paresthesias. Risk factors include sedentary lifestyle, lack of exercise, history of steroid use, poor posture and obesity. She has tried ice, chiropractic manipulation, heat, home exercises, analgesics, bed rest, NSAIDs and walking for the symptoms. The treatment provided mild relief.    Neck Pain   Associated symptoms include weakness. Pertinent negatives include no chest pain, fever, headaches, numbness, paresis or photophobia. Hip Pain   The incident occurred more than 1 week ago. There was no injury mechanism. The pain is at a severity of 7/10. The pain has been Fluctuating since onset. Pertinent negatives include no numbness. The symptoms are aggravated by movement, palpation and weight bearing. She has tried acetaminophen, ice, non-weight bearing, NSAIDs, immobilization and rest for the symptoms. The treatment provided mild relief.            Past Medical History:   Diagnosis Date    Abnormal Pap smear of cervix     Anxiety 7/10/2018    Anxiety     ASCUS with positive high risk HPV cervical 12/03/2018    Bilateral carpal tunnel syndrome 5/15/2019    Bipolar disorder, in partial remission, most recent episode mixed ([address] 12/3/2021    Bipolar disorder, in partial remission, most recent episode mixed ([address] 12/3/2021    Chronic constipation 12/3/2021    Colon polyp     Diabetes type 2, uncontrolled     Essential hypertension 5/29/2018    Excessive daytime sleepiness     Fibromyalgia 5/29/2018    Gastroesophageal reflux disease without esophagitis 12/3/2021    Gastroesophageal reflux disease without esophagitis 12/3/2021    Herpes     HPV in female 12/03/2018    HPV 18    Hyperlipidemia     Obesity     PTSD (post-traumatic stress disorder) 12/3/2021    PTSD (post-traumatic stress disorder) 12/3/2021    Reactive depression 6/5/2019    Sleep apnea     Snoring     Thyroid disease     Type 2 diabetes mellitus with retinopathy, with long-term current use of insulin ([address] 12/7/2015    Unrefreshed by sleep        Past Surgical History:   Procedure Laterality Date    COLONOSCOPY      COLONOSCOPY N/A 1/27/2022    COLORECTAL CANCER SCREENING, HIGH RISK performed by Haley Dill MD at [facility] History     Socioeconomic History    Marital status:      Spouse name: None    Number of children: None Years of education: None    Highest education level: None   Occupational History    Occupation: [employer]   Tobacco Use    Smoking status: Never    Smokeless tobacco: Never   Vaping Use    Vaping Use: Never used   Substance and Sexual Activity    Alcohol use: Yes     Comment: socially    Drug use: No    Sexual activity: Yes     Partners: Male     Comment: No BC; primary infertility       Family History   Problem Relation Age of Onset    Diabetes Father     High Blood Pressure Father     Retinal Detachment Father     Cancer Father         skin    Thyroid Disease Mother     Kidney Disease Sister     Inflam Bowel Dis Brother     No Known Problems Sister     Other Brother     No Known Problems Paternal Grandfather     No Known Problems Paternal Grandmother     No Known Problems Maternal Grandmother     No Known Problems Maternal Grandfather     No Known Problems Other     Breast Cancer Maternal Aunt     Colon Cancer Neg Hx     Eclampsia Neg Hx     Hypertension Neg Hx     Ovarian Cancer Neg Hx      Labor Neg Hx     Spont Abortions Neg Hx     Stroke Neg Hx        Allergies   Allergen Reactions    Niacin And Related Swelling and Rash       Review of Systems   Constitutional:  Negative for fever. Eyes:  Negative for photophobia. Cardiovascular:  Negative for chest pain. Gastrointestinal:  Negative for abdominal pain and bowel incontinence. Genitourinary:  Negative for bladder incontinence and dysuria. Musculoskeletal:  Positive for back pain and neck pain. Neurological:  Positive for weakness. Negative for numbness, headaches and paresthesias. Objective    There were no vitals filed for this visit.     No data recorded            PHYSICAL EXAMINATION:  [ INSTRUCTIONS:  \"[x]\" Indicates a positive item  \"[]\" Indicates a negative item  -- DELETE ALL ITEMS NOT EXAMINED]    [x] Alert  [x] Oriented to person/place/time      [x] No apparent distress  [x] Not toxic appearing    [x] Face complexion normal appearing [x] Sclera clear  [x] Lips are not cyanotic      [x] Breathing appears normal  [] Appears tachypneic      [] Rash on visible skin    [x] Cranial Nerves II-XII grossly intact    [x] Motor grossly intact in visible upper extremities, including stiff but intact range of motion in cervical, upper extremity and thoracic  [x] Motor grossly intact in visible lower extremities, including normal range of motion in the hips and knees for stiffness in the lumbar spine    [x] Normal Mood  [x] Not anxious appearing    [x] Not depressed appearing  [x] Not confused appearing      [x]  Good short term memory  [x]  Good long term memory    [x]  Able to follow 2-3 step commands    Due to this being a TeleHealth encounter, evaluation of the following organ systems is limited: Vitals/Constitutional/EENT/Resp/CV/GI//MS/Neuro/Skin/Heme-Lymph-Imm. ASSESSMENT/PLAN:     After a thorough review and discussion of the previous medical records, patient comprehensive medical, surgical, and family and social history, Review of Systems, their OARRS, their Screener and Opioid Assessment for Patients with Pain (SOAPP®-R), recent diagnostics, and symptomatic results to previous treatment, it is my impression that the patients is suffering with progressive and severe:     Diagnosis Orders   1. Fibromyalgia        2. Chronic pain of both knees        3. High risk medication use        4. Vitamin D deficiency        5. Bipolar disorder, in partial remission, most recent episode mixed ([facility]        6.  Myalgia  MT INJECT TRIGGER POINTS, 3 OR GREATER    MT INJECT TRIGGER POINTS, 3 OR GREATER          I am also concerned by lifestyle and mood issues including:    Past Medical History:   Diagnosis Date    Abnormal Pap smear of cervix     Anxiety 7/10/2018    Anxiety     ASCUS with positive high risk HPV cervical 12/03/2018    Bilateral carpal tunnel syndrome 5/15/2019    Bipolar disorder, in partial remission, most recent episode mixed ([address] 12/3/2021    Bipolar disorder, in partial remission, most recent episode mixed ([address] 12/3/2021    Chronic constipation 12/3/2021    Colon polyp     Diabetes type 2, uncontrolled     Essential hypertension 5/29/2018    Excessive daytime sleepiness     Fibromyalgia 5/29/2018    Gastroesophageal reflux disease without esophagitis 12/3/2021    Gastroesophageal reflux disease without esophagitis 12/3/2021    Herpes     HPV in female 12/03/2018    HPV 18    Hyperlipidemia     Obesity     PTSD (post-traumatic stress disorder) 12/3/2021    PTSD (post-traumatic stress disorder) 12/3/2021    Reactive depression 6/5/2019    Sleep apnea     Snoring     Thyroid disease     Type 2 diabetes mellitus with retinopathy, with long-term current use of insulin ([facility] [address] 12/7/2015    Unrefreshed by sleep            Given their medication, chronic pain and lifestyle and medications they are at risk for :    Falls, constipation, addiction, toxicity due to controlled/opiate medications  Loss of livelyhood due to severe pain, debility, weight gain and  vitamin D deficiency    The patient was educated regarding proper diet, fitness routine, and regulatory restrictions concerning pain medications. Previous notes, comprehensive past medical, surgical, family history, and diagnostics were reviewed. Patient education and councelling were provided regarding off label use,treatment options and medication and injection risks. Overall greater than 25 minutes spent in direct and indirect patient care. Current and old OARRS ([place] Automated Prescription Reporting System) records reviewed, all refills reviewed since last visit,  Behavioral agreement/RAMON regulations   and Toxicology screen was reviewed with patient and is up to date. There are no current red flags.    They are making good progress regarding pain relief, they are performing at a functional level regarding activities of daily living, family interactions and psychological functioning, they're not having any adverse effects or side effects from the current medications, and I see no findings of aberrant drug taking or addiction related behaviors. The patient is aware that they have a chronic pain condition and they may require opiates dosing for life. All efforts will be made to wean to the lowest effective dose. Other therapies for pain have not been effective including nonopiate medications. Injections and exercises are only partially effective. A Rx for Narcan was offered to help prevent accidental overdose. RX Monitoring 5/15/2019   Attestation The Prescription Monitoring Report for this patient was reviewed today. Periodic Controlled Substance Monitoring No signs of potential drug abuse or diversion identified: otherwise, see note documentation;Obtaining appropriate analgesic effect of treatment.;Possible medication side effects, risk of tolerance/dependence & alternative treatments discussed. Chronic Pain > 50 MEDD Obtained or confirmened \"Consent of Opioid Use\" on file.;Considered consultation with a specialist.;Re-evaluated the status of the patient's underlying condition causing pain. Chronic Pain > 80 MEDD -               Patient is currently taking:       I am having Rudy Kristin. Silver Sera \"Mariadelcarmen Maxwell\" maintain her ONE TOUCH ULTRA 2, Abdominal Binder/Elastic Large, Lancets, sodium phosphate, aspirin, vitamin D, melatonin, Accu-Chek FastClix Lancets, Accu-Chek Guide, docusate sodium, Breo Ellipta, lisinopril, ARIPiprazole, atorvastatin, fenofibrate, chlorthalidone, valACYclovir, pioglitazone, Trulicity, Trulicity, insulin aspart, metFORMIN, and HumaLOG. I also recommend the following Medications:    No orders of the defined types were placed in this encounter.       -which helps with pain and function. Otherwise, continue the current pain medications that I have prescibed.       Radiologic:   Old films reviewed,     I discussed results with patients. see Follow up plans below  For any new studies. Care Everywhere Updates:  requested and reviewed. No new issues noted. Electrodiagnostic:  Previous studies requested,     I discussed results with patient. See follow-up plans for new studies. Additional history obtained from independent sourcing including patient's family and caregivers. Labs:  Previous labs reviewed     Lab Results   Component Value Date/Time     04/07/2022 07:15 AM    K 4.3 04/07/2022 07:15 AM     04/07/2022 07:15 AM    CO2 22 04/07/2022 07:15 AM    BUN 17 04/07/2022 07:15 AM    CREATININE 0.72 04/07/2022 07:15 AM    CALCIUM 9.4 04/07/2022 07:15 AM    LABALBU 4.1 12/07/2021 07:44 AM    BILITOT 0.4 12/07/2021 07:44 AM    ALKPHOS 66 12/07/2021 07:44 AM    AST 31 12/07/2021 07:44 AM    ALT 57 12/07/2021 07:44 AM     Lab Results   Component Value Date/Time    WBC 7.8 12/07/2021 07:44 AM    RBC 4.93 12/07/2021 07:44 AM    HGB 13.4 12/07/2021 07:44 AM    HCT 40.6 12/07/2021 07:44 AM    MCV 82.4 12/07/2021 07:44 AM    MCH 27.2 12/07/2021 07:44 AM    MCHC 33.0 12/07/2021 07:44 AM    RDW 13.8 12/07/2021 07:44 AM     12/07/2021 07:44 AM    MPV 9.4 09/12/2015 08:46 AM       Lab Results   Component Value Date/Time    LABAMPH Neg 04/07/2022 07:26 AM    BARBSCNU Neg 04/07/2022 07:26 AM    LABBENZ Neg 04/07/2022 07:26 AM    CANSU Neg 04/07/2022 07:26 AM    COCAIMETSCRU Neg 04/07/2022 07:26 AM    PHENCYCLIDINESCREENURINE Neg 04/07/2022 07:26 AM    DSCOMMENT see below 04/07/2022 07:26 AM       No results found for: CODEINE, MORPHINE, ACETYLMORPHI, OXYCODONE, NOROXYCODONE, NOROXYMU, HYDRCO, NORHYDU, HYDROMO, BUPREN, NORBUPRNOR, FENTA, NORFENT, MEPERIDINE, TAPENU, TAPOSULFUR, METHADONE, LABPROP, TRAM, AMPH, METHAMP, MDMA, ECMDA    Lab Results   Component Value Date/Time    LABCREA 226.8 12/03/2021 08:31 AM         , I discussed results with patient. See follow-up plans for new studies.     Therapies: HEP-gentle stretching and relaxation techniques-demonstrated with patient-they are to do them twice a day. They are also advised to make the following lifestyle changes:   Goals         < 200      SOAPP-R      4/3/19 score: 9 - moderate risk  2/14/22 score: 7- low risk              Injections or Epidurals:  Injection options were discussed. Monthly trigger point injections add vitamin B12 when able. Patient gave verbal consent to ordered injections. See follow-up plans for planned injections. Supplements:  Vitamin D with increased dosing during the rainy months, add co-Q10 for heart health. Education was given on:   Dietary and Fitness--daily stretches and low carb diet-in chair Yoga when possible      Note controlled medication/opiate drug therapy requires intensive monitoring for toxicity and addiction. Follow up with Primary Care Physician regarding their general medical needs. Stressed the importance of following up with PCP and specialists for his/her chronic diseases, health, CV, and cancer screening and continued care. Will follow disease activity/progression and adjust therapeutic regimen to disease activity and severity. Discussed medication dosage, usage, goals of therapy, and side effects. Available test results were reviewed -Discussed findings, impression and plan with patient. An additional  minutes were spent outside of the patient visit to review records. Additional time spent with the patient to discuss their questions. Additional time spent with the patient devoted to discussing treatment strategy, planning, and implementation generalized musculoskeletal health plan. Patient understands above plan; questions asked and answered. Patient agrees to plan as noted above.           On this date  1/10/23 I have spent 5 minutes reviewing previous notes, test results and face to face (virtual) with the patient discussing the diagnosis and importance of compliance with the treatment plan as well as documenting on the day of the visit. At least 50% of the visit was involved in the discussion of the options for treatment. We discussed exercises, medication, interventional therapies and surgery. Healthy life style is essential with patient hard work to achieve the wellness. In addition; discussion with the patient and/or family about any of the diagnostic results, impressions and/or recommended diagnostic studies, prognosis, risks and benefits of treatment options, instructions for treatment and/or follow-up, importance of compliance with chosen treatment options, risk-factor reduction, and patient/family education. They are to follow up in 2-2 1/2 months to review medication, efficacy of injections, pill counts, OARRS check, SOAPPR assessment, review diagnostics, to review previous and future treatment plans and assess appropriateness for continued therapy,  and regarding the efficacy of current plan and future treatment. New Diagnostics  Orders Placed This Encounter   Procedures    [place] INJECT TRIGGER POINTS, 3 OR GREATER    [place] INJECT TRIGGER POINTS, 3 OR GREATER             An  electronic signature was used to authenticate this note. -Dr Fátima Gill, DO       With MA assistance from:   Coleen Jacobson MA     19}    Pursuant to the emergency declaration under the [facility], [facility] waiver authority and the [employer] and [employer] Act, this Virtual  Visit was conducted, with patient's consent, to reduce the patient's risk of exposure to COVID-19 and provide continuity of care for an established patient. Services were provided through a video synchronous discussion virtually to substitute for in-person clinic visit.

## 2023-01-10 ENCOUNTER — TELEMEDICINE (OUTPATIENT)
Dept: PHYSICAL MEDICINE AND REHAB | Age: 48
End: 2023-01-10
Payer: MEDICARE

## 2023-01-10 DIAGNOSIS — M25.561 CHRONIC PAIN OF BOTH KNEES: ICD-10-CM

## 2023-01-10 DIAGNOSIS — Z79.899 HIGH RISK MEDICATION USE: Chronic | ICD-10-CM

## 2023-01-10 DIAGNOSIS — M79.7 FIBROMYALGIA: Primary | Chronic | ICD-10-CM

## 2023-01-10 DIAGNOSIS — Z76.89 ENCOUNTER TO ESTABLISH CARE: Primary | ICD-10-CM

## 2023-01-10 DIAGNOSIS — F31.77 BIPOLAR DISORDER, IN PARTIAL REMISSION, MOST RECENT EPISODE MIXED (HCC): Chronic | ICD-10-CM

## 2023-01-10 DIAGNOSIS — E55.9 VITAMIN D DEFICIENCY: Chronic | ICD-10-CM

## 2023-01-10 DIAGNOSIS — M25.562 CHRONIC PAIN OF BOTH KNEES: ICD-10-CM

## 2023-01-10 DIAGNOSIS — G89.29 CHRONIC PAIN OF BOTH KNEES: ICD-10-CM

## 2023-01-10 DIAGNOSIS — M79.10 MYALGIA: ICD-10-CM

## 2023-01-10 PROCEDURE — G8417 CALC BMI ABV UP PARAM F/U: HCPCS | Performed by: PHYSICAL MEDICINE & REHABILITATION

## 2023-01-10 PROCEDURE — 99213 OFFICE O/P EST LOW 20 MIN: CPT | Performed by: PHYSICAL MEDICINE & REHABILITATION

## 2023-01-10 PROCEDURE — G8482 FLU IMMUNIZE ORDER/ADMIN: HCPCS | Performed by: PHYSICAL MEDICINE & REHABILITATION

## 2023-01-10 PROCEDURE — G8427 DOCREV CUR MEDS BY ELIG CLIN: HCPCS | Performed by: PHYSICAL MEDICINE & REHABILITATION

## 2023-01-10 PROCEDURE — 1036F TOBACCO NON-USER: CPT | Performed by: PHYSICAL MEDICINE & REHABILITATION

## 2023-01-11 ENCOUNTER — TELEPHONE (OUTPATIENT)
Dept: FAMILY MEDICINE CLINIC | Age: 48
End: 2023-01-11

## 2023-01-11 NOTE — TELEPHONE ENCOUNTER
----- Message from Dejuan Rivera sent at 1/11/2023  1:42 PM EST -----  Subject: Message to Provider    QUESTIONS  Information for Provider? pt is requesting care under PCP [facility]. pt   was seen by Jason Lozano.  ---------------------------------------------------------------------------  --------------  [address]  [phone]; OK to respond with electronic message via [employer] portal (only   for patients who have registered [employer] account)  ---------------------------------------------------------------------------  --------------  SCRIPT ANSWERS  Relationship to Patient?  Self

## 2023-01-18 ENCOUNTER — TELEPHONE (OUTPATIENT)
Dept: FAMILY MEDICINE CLINIC | Age: 48
End: 2023-01-18

## 2023-01-18 NOTE — TELEPHONE ENCOUNTER
----- Message from Katalina Downs sent at 1/18/2023 11:25 AM EST -----  Subject: Appointment Request    Reason for Call: New Patient/New to Provider Appointment needed: New   Patient Request Appointment    QUESTIONS    Reason for appointment request? Requested Provider unavailable - Kari Rivera PA-C     Additional Information for Provider? PT would like to Est New Care with   requested PCP, Former PCP June Leo has retired. Please   contact PT to set New PT/Provider appt with requested PCP Dominik Fagan PA-C.  PT would prefer next available on Friday's.  ---------------------------------------------------------------------------  --------------  Pamela HICKS  [phone]; OK to leave message on voicemail,Do not leave any message,   patient will call back for answer,OK to respond with electronic message   via [employer] portal (only for patients who have registered [employer] account)  ---------------------------------------------------------------------------  --------------  SCRIPT ANSWERS  PETER Screen: Jhonatan Huerta

## 2023-01-19 DIAGNOSIS — B00.9 HSV-2 (HERPES SIMPLEX VIRUS 2) INFECTION: Chronic | ICD-10-CM

## 2023-01-19 RX ORDER — VALACYCLOVIR HYDROCHLORIDE 1 G/1
1000 TABLET, FILM COATED ORAL 2 TIMES DAILY
Qty: 20 TABLET | Refills: 0 | OUTPATIENT
Start: 2023-01-19

## 2023-01-24 DIAGNOSIS — B00.9 HSV-2 (HERPES SIMPLEX VIRUS 2) INFECTION: Chronic | ICD-10-CM

## 2023-01-24 RX ORDER — VALACYCLOVIR HYDROCHLORIDE 1 G/1
1000 TABLET, FILM COATED ORAL 2 TIMES DAILY
Qty: 20 TABLET | Refills: 0 | OUTPATIENT
Start: 2023-01-24

## 2023-02-17 ENCOUNTER — TELEPHONE (OUTPATIENT)
Dept: GASTROENTEROLOGY | Age: 48
End: 2023-02-17

## 2023-02-28 ENCOUNTER — OFFICE VISIT (OUTPATIENT)
Dept: PRIMARY CARE CLINIC | Age: 48
End: 2023-02-28

## 2023-02-28 VITALS
WEIGHT: 274.4 LBS | HEART RATE: 81 BPM | BODY MASS INDEX: 46.85 KG/M2 | HEIGHT: 64 IN | SYSTOLIC BLOOD PRESSURE: 134 MMHG | OXYGEN SATURATION: 97 % | DIASTOLIC BLOOD PRESSURE: 82 MMHG | TEMPERATURE: 97.5 F

## 2023-02-28 DIAGNOSIS — E08.9 DIABETES MELLITUS DUE TO UNDERLYING CONDITION WITHOUT COMPLICATION, WITH LONG-TERM CURRENT USE OF INSULIN (HCC): ICD-10-CM

## 2023-02-28 DIAGNOSIS — I10 ESSENTIAL HYPERTENSION: Chronic | ICD-10-CM

## 2023-02-28 DIAGNOSIS — B00.9 HSV-2 (HERPES SIMPLEX VIRUS 2) INFECTION: Chronic | ICD-10-CM

## 2023-02-28 DIAGNOSIS — E78.2 MIXED HYPERLIPIDEMIA: ICD-10-CM

## 2023-02-28 DIAGNOSIS — Z00.00 HEALTHCARE MAINTENANCE: Primary | ICD-10-CM

## 2023-02-28 DIAGNOSIS — F51.04 PSYCHOPHYSIOLOGICAL INSOMNIA: ICD-10-CM

## 2023-02-28 DIAGNOSIS — R06.02 SHORTNESS OF BREATH: ICD-10-CM

## 2023-02-28 DIAGNOSIS — F31.77 BIPOLAR DISORDER, IN PARTIAL REMISSION, MOST RECENT EPISODE MIXED (HCC): Chronic | ICD-10-CM

## 2023-02-28 DIAGNOSIS — F43.10 PTSD (POST-TRAUMATIC STRESS DISORDER): Chronic | ICD-10-CM

## 2023-02-28 DIAGNOSIS — Z79.4 DIABETES MELLITUS DUE TO UNDERLYING CONDITION WITHOUT COMPLICATION, WITH LONG-TERM CURRENT USE OF INSULIN (HCC): ICD-10-CM

## 2023-02-28 RX ORDER — FLUTICASONE FUROATE AND VILANTEROL 200; 25 UG/1; UG/1
1 POWDER RESPIRATORY (INHALATION) DAILY
Qty: 1 EACH | Refills: 3 | Status: SHIPPED | OUTPATIENT
Start: 2023-02-28

## 2023-02-28 RX ORDER — BLOOD SUGAR DIAGNOSTIC
STRIP MISCELLANEOUS
Qty: 150 EACH | Refills: 3 | Status: SHIPPED | OUTPATIENT
Start: 2023-02-28

## 2023-02-28 RX ORDER — LANOLIN ALCOHOL/MO/W.PET/CERES
3 CREAM (GRAM) TOPICAL NIGHTLY PRN
Qty: 20 TABLET | Refills: 1 | Status: SHIPPED | OUTPATIENT
Start: 2023-02-28

## 2023-02-28 RX ORDER — ATORVASTATIN CALCIUM 40 MG/1
40 TABLET, FILM COATED ORAL DAILY
Qty: 90 TABLET | Refills: 1 | Status: SHIPPED | OUTPATIENT
Start: 2023-02-28

## 2023-02-28 RX ORDER — FENOFIBRATE 160 MG/1
160 TABLET ORAL DAILY
Qty: 90 TABLET | Refills: 1 | Status: SHIPPED | OUTPATIENT
Start: 2023-02-28

## 2023-02-28 RX ORDER — VALACYCLOVIR HYDROCHLORIDE 1 G/1
1000 TABLET, FILM COATED ORAL DAILY
Qty: 20 TABLET | Refills: 0 | Status: SHIPPED | OUTPATIENT
Start: 2023-02-28 | End: 2023-03-05

## 2023-02-28 RX ORDER — ARIPIPRAZOLE 5 MG/1
5 TABLET ORAL DAILY
Qty: 30 TABLET | Refills: 3 | Status: SHIPPED | OUTPATIENT
Start: 2023-02-28

## 2023-02-28 RX ORDER — PIOGLITAZONEHYDROCHLORIDE 30 MG/1
15 TABLET ORAL DAILY
Qty: 90 TABLET | Refills: 1 | Status: SHIPPED | OUTPATIENT
Start: 2023-02-28

## 2023-02-28 RX ORDER — LISINOPRIL 20 MG/1
20 TABLET ORAL DAILY
Qty: 30 TABLET | Refills: 12 | Status: SHIPPED | OUTPATIENT
Start: 2023-02-28

## 2023-02-28 RX ORDER — LANCETS 30 GAUGE
1 EACH MISCELLANEOUS 3 TIMES DAILY
Qty: 300 EACH | Refills: 3 | Status: SHIPPED | OUTPATIENT
Start: 2023-02-28

## 2023-02-28 SDOH — ECONOMIC STABILITY: HOUSING INSECURITY
IN THE LAST 12 MONTHS, WAS THERE A TIME WHEN YOU DID NOT HAVE A STEADY PLACE TO SLEEP OR SLEPT IN A SHELTER (INCLUDING NOW)?: NO

## 2023-02-28 SDOH — HEALTH STABILITY: PHYSICAL HEALTH: ON AVERAGE, HOW MANY MINUTES DO YOU ENGAGE IN EXERCISE AT THIS LEVEL?: 0 MIN

## 2023-02-28 SDOH — ECONOMIC STABILITY: INCOME INSECURITY: HOW HARD IS IT FOR YOU TO PAY FOR THE VERY BASICS LIKE FOOD, HOUSING, MEDICAL CARE, AND HEATING?: NOT VERY HARD

## 2023-02-28 SDOH — ECONOMIC STABILITY: FOOD INSECURITY: WITHIN THE PAST 12 MONTHS, YOU WORRIED THAT YOUR FOOD WOULD RUN OUT BEFORE YOU GOT MONEY TO BUY MORE.: NEVER TRUE

## 2023-02-28 SDOH — HEALTH STABILITY: PHYSICAL HEALTH: ON AVERAGE, HOW MANY DAYS PER WEEK DO YOU ENGAGE IN MODERATE TO STRENUOUS EXERCISE (LIKE A BRISK WALK)?: 0 DAYS

## 2023-02-28 SDOH — ECONOMIC STABILITY: FOOD INSECURITY: WITHIN THE PAST 12 MONTHS, THE FOOD YOU BOUGHT JUST DIDN'T LAST AND YOU DIDN'T HAVE MONEY TO GET MORE.: NEVER TRUE

## 2023-02-28 ASSESSMENT — SOCIAL DETERMINANTS OF HEALTH (SDOH)
WITHIN THE LAST YEAR, HAVE YOU BEEN KICKED, HIT, SLAPPED, OR OTHERWISE PHYSICALLY HURT BY YOUR PARTNER OR EX-PARTNER?: NO
WITHIN THE LAST YEAR, HAVE YOU BEEN AFRAID OF YOUR PARTNER OR EX-PARTNER?: NO
WITHIN THE LAST YEAR, HAVE YOU BEEN HUMILIATED OR EMOTIONALLY ABUSED IN OTHER WAYS BY YOUR PARTNER OR EX-PARTNER?: NO
WITHIN THE LAST YEAR, HAVE TO BEEN RAPED OR FORCED TO HAVE ANY KIND OF SEXUAL ACTIVITY BY YOUR PARTNER OR EX-PARTNER?: NO

## 2023-02-28 ASSESSMENT — ENCOUNTER SYMPTOMS
VOMITING: 0
ANAL BLEEDING: 0
SHORTNESS OF BREATH: 1
ABDOMINAL DISTENTION: 0
DIARRHEA: 0
BLOOD IN STOOL: 0
BACK PAIN: 1
WHEEZING: 0
NAUSEA: 0
ABDOMINAL PAIN: 1
STRIDOR: 0
CONSTIPATION: 1

## 2023-02-28 ASSESSMENT — PATIENT HEALTH QUESTIONNAIRE - PHQ9
1. LITTLE INTEREST OR PLEASURE IN DOING THINGS: 0
3. TROUBLE FALLING OR STAYING ASLEEP: 0
2. FEELING DOWN, DEPRESSED OR HOPELESS: 0
SUM OF ALL RESPONSES TO PHQ QUESTIONS 1-9: 0
8. MOVING OR SPEAKING SO SLOWLY THAT OTHER PEOPLE COULD HAVE NOTICED. OR THE OPPOSITE, BEING SO FIGETY OR RESTLESS THAT YOU HAVE BEEN MOVING AROUND A LOT MORE THAN USUAL: 0
SUM OF ALL RESPONSES TO PHQ QUESTIONS 1-9: 0
4. FEELING TIRED OR HAVING LITTLE ENERGY: 0
10. IF YOU CHECKED OFF ANY PROBLEMS, HOW DIFFICULT HAVE THESE PROBLEMS MADE IT FOR YOU TO DO YOUR WORK, TAKE CARE OF THINGS AT HOME, OR GET ALONG WITH OTHER PEOPLE: 0
SUM OF ALL RESPONSES TO PHQ QUESTIONS 1-9: 0
SUM OF ALL RESPONSES TO PHQ QUESTIONS 1-9: 0
5. POOR APPETITE OR OVEREATING: 0
7. TROUBLE CONCENTRATING ON THINGS, SUCH AS READING THE NEWSPAPER OR WATCHING TELEVISION: 0
6. FEELING BAD ABOUT YOURSELF - OR THAT YOU ARE A FAILURE OR HAVE LET YOURSELF OR YOUR FAMILY DOWN: 0
SUM OF ALL RESPONSES TO PHQ9 QUESTIONS 1 & 2: 0
9. THOUGHTS THAT YOU WOULD BE BETTER OFF DEAD, OR OF HURTING YOURSELF: 0

## 2023-02-28 NOTE — PROGRESS NOTES
Subjective:      Patient ID: Darryl Maharaj is a 53 y.o. female who presents today for:  Chief Complaint   Patient presents with    Establish Care    Medication Refill       HPI patient presents for establishment of care. Her blood pressures have been controlled. No chest pain, palpitations, dizziness or neurological change. Patient has not been following up with endocrinology as previously recommended due to insurance issues. She is using Humalog and Actos. She ran out of Glucophage and has not been using it lately. Her glucoses generally run high from 150-400. She has new insurance now and is planning to follow-up with endocrinology (Dr. Hollis Catalan). Patient is due for her diabetic eye exam, she plans to call and schedule appointment with her  previous provider. Patient states she has a history of bipolar disorder and posttraumatic stress disorder. She currently does not have a therapist.  However, the Abilify seems to be working well for her. She has no thoughts of hurting self or others. No rudy, hallucinations or paranoia. Mood is generally good. Sleep has improved. She uses melatonin as needed, about 2-4 times per month. She is interested in establishing care with therapist.    Patient request refill of Breo. This was started by pulmonology however her insurance previously would not cover this medication. She plans to call pulmonology for follow-up visit also- may need another referral.  NO longer uses CPAP. Patient with history of HPV and abnormal Pap in past, patient is due for Pap, referral made to OB/GYN. Periods are irregular. History of chronic constipation, improved with MiraLAX. Referral has been made for recommended colonoscopy.     Past Medical History:   Diagnosis Date    Abnormal Pap smear of cervix     Anxiety 7/10/2018    Anxiety     ASCUS with positive high risk HPV cervical 12/03/2018    Bilateral carpal tunnel syndrome 5/15/2019    Bipolar disorder, in partial remission, most recent episode mixed ([facility] [address] 12/3/2021    Bipolar disorder, in partial remission, most recent episode mixed ([facility] [address] 12/3/2021    Chronic constipation 12/3/2021    Colon polyp     Diabetes type 2, uncontrolled     Essential hypertension 5/29/2018    Excessive daytime sleepiness     Fibromyalgia 5/29/2018    Gastroesophageal reflux disease without esophagitis 12/3/2021    Gastroesophageal reflux disease without esophagitis 12/3/2021    Herpes     HPV in female 12/03/2018    HPV 18    Hyperlipidemia     Obesity     PTSD (post-traumatic stress disorder) 12/3/2021    PTSD (post-traumatic stress disorder) 12/3/2021    Reactive depression 6/5/2019    Sleep apnea     Snoring     Thyroid disease     Type 2 diabetes mellitus with retinopathy, with long-term current use of insulin ([facility] [address] 12/7/2015    Unrefreshed by sleep      Past Surgical History:   Procedure Laterality Date    COLONOSCOPY      COLONOSCOPY N/A 1/27/2022    COLORECTAL CANCER SCREENING, HIGH RISK performed by Tobias Underwood MD at [address] [place] [address] History    Marital status:      Spouse name: Not on file    Number of children: Not on file    Years of education: Not on file    Highest education level: Not on file   Occupational History    Occupation: [address]   Tobacco Use    Smoking status: Never    Smokeless tobacco: Never   Vaping Use    Vaping Use: Never used   Substance and Sexual Activity    Alcohol use: Yes     Comment: socially    Drug use: No    Sexual activity: Yes     Partners: Male     Comment: No BC; primary infertility   Other Topics Concern    Not on file   Social History Narrative    Not on file     Social Determinants of Health     Financial Resource Strain: Low Risk     Difficulty of Paying Living Expenses: Not very hard   Food Insecurity: No Food Insecurity    Worried About Running Out of Food in the Last Year: Never true    [address] in the Last Year: Never true Transportation Needs: Unknown    Lack of Transportation (Medical): Not on file    Lack of Transportation (Non-Medical): No   Physical Activity: Inactive    Days of Exercise per Week: 0 days    Minutes of Exercise per Session: 0 min   Stress: Not on file   Social Connections: Not on file   Intimate Partner Violence: Not At Risk    Fear of Current or Ex-Partner: No    Emotionally Abused: No    Physically Abused: No    Sexually Abused: No   Housing Stability: Unknown    Unable to Pay for Housing in the Last Year: Not on file    Number of Places Lived in the Last Year: Not on file    Unstable Housing in the Last Year: No     Family History   Problem Relation Age of Onset    Diabetes Father     High Blood Pressure Father     Retinal Detachment Father     Cancer Father         skin    Thyroid Disease Mother     Kidney Disease Sister     Inflam Bowel Dis Brother     No Known Problems Sister     Other Brother     No Known Problems Paternal Grandfather     No Known Problems Paternal Grandmother     No Known Problems Maternal Grandmother     No Known Problems Maternal Grandfather     No Known Problems Other     Breast Cancer Maternal Aunt     Colon Cancer Neg Hx     Eclampsia Neg Hx     Hypertension Neg Hx     Ovarian Cancer Neg Hx      Labor Neg Hx     Spont Abortions Neg Hx     Stroke Neg Hx      Allergies   Allergen Reactions    Niacin And Related Swelling and Rash         Review of Systems   Constitutional: Negative. HENT: Negative. Respiratory:  Positive for shortness of breath. Negative for wheezing and stridor. Cardiovascular: Negative. Gastrointestinal:  Positive for abdominal pain (occ LLQ discomfort when constipated) and constipation. Negative for abdominal distention, anal bleeding, blood in stool, diarrhea, nausea and vomiting. Endocrine: Negative. Genitourinary: Negative. Musculoskeletal:  Positive for back pain and myalgias (h/o fibromyalgia and trigger point injections. ).   Neurological: Negative.    Psychiatric/Behavioral:  Negative for confusion, decreased concentration, dysphoric mood, self-injury and suicidal ideas. The patient is not nervous/anxious.      Objective:   /82 (Site: Right Upper Arm, Position: Sitting, Cuff Size: Large Adult)   Pulse 81   Temp 97.5 °F (36.4 °C) (Temporal)   Ht 5' 4\" (1.626 m)   Wt 274 lb 6.4 oz (124.5 kg)   LMP 02/22/2023   SpO2 97%   BMI 47.10 kg/m²     Physical Exam  Vitals reviewed.   Constitutional:       General: She is not in acute distress.     Appearance: Normal appearance. She is obese.   HENT:      Head: Normocephalic.      Mouth/Throat:      Mouth: Mucous membranes are moist.      Pharynx: Oropharynx is clear. No oropharyngeal exudate or posterior oropharyngeal erythema.   Eyes:      Extraocular Movements: Extraocular movements intact.      Conjunctiva/sclera: Conjunctivae normal.      Pupils: Pupils are equal, round, and reactive to light.   Cardiovascular:      Rate and Rhythm: Normal rate and regular rhythm.      Heart sounds: Normal heart sounds.   Pulmonary:      Effort: Pulmonary effort is normal. No respiratory distress.      Breath sounds: Normal breath sounds. No wheezing.   Abdominal:      General: Abdomen is flat. Bowel sounds are normal. There is no distension.      Palpations: Abdomen is soft. There is no mass.      Tenderness: There is no abdominal tenderness. There is no guarding.   Musculoskeletal:         General: Normal range of motion.      Cervical back: Normal range of motion and neck supple.      Right lower leg: No edema.      Left lower leg: No edema.   Lymphadenopathy:      Cervical: No cervical adenopathy.   Skin:     General: Skin is warm and dry.      Findings: Rash: mild fungal like rash at base of toes bilaterally, hyperpigmentation.   Neurological:      General: No focal deficit present.      Mental Status: She is alert and oriented to person, place, and time.      Cranial Nerves: No cranial  nerve deficit. Sensory: No sensory deficit. Motor: No weakness. Gait: Gait normal.   Psychiatric:         Mood and Affect: Mood normal.         Behavior: Behavior normal.         Thought Content: Thought content normal.         Judgment: Judgment normal.       Assessment:       Diagnosis Orders   1. Healthcare maintenance  Susana Barone MD, OB-GYN, [place]      2. Type 2 diabetes mellitus without complication, without long-term current use of insulin ([address]        3. Psychophysiological insomnia  melatonin 3 MG TABS tablet      4. Mixed hyperlipidemia  atorvastatin (LIPITOR) 40 MG tablet    fenofibrate (TRIGLIDE) 160 MG tablet    Lipid Panel    TSH with Reflex    follow labs      5. Essential hypertension  lisinopril (PRINIVIL;ZESTRIL) 20 MG tablet    Comprehensive Metabolic Panel    TSH with Reflex    CBC with Auto Differential    Worse, poor control; add chlorthalidone 15 mg; will benefit from chlorthalidone with continued lisinpril 20 mg      6. Type 2 diabetes mellitus with mild nonproliferative retinopathy of both eyes, with long-term current use of insulin, macular edema presence unspecified (HCC)  Lancets MISC    blood glucose test strips (ACCU-CHEK GUIDE) strip    pioglitazone (ACTOS) 30 MG tablet    Comprehensive Metabolic Panel    TSH with Reflex    CBC with Auto Differential    worse, poor-control; referred back to Dr. Farideh High. Trulicity sample 0.52 x 2wks then 1.5 x 2 wks provided. Increased Actos to 30mg      7. HSV-2 (herpes simplex virus 2) infection  valACYclovir (VALTREX) 1 g tablet    extensive herpes outbreak and inadequate initial treatment when first diagnosed, will treat as if  initial outbreak with Valtrex 1 g twice a day for 10 days      8. Bipolar disorder, in partial remission, most recent episode mixed (HCC)  ARIPiprazole (ABILIFY) 5 MG tablet    Ambulatory referral to Psychology    improving, fair cnotrol; desires [facility] [place]      9.  PTSD (post-traumatic stress disorder) ARIPiprazole (ABILIFY) 5 MG tablet    Ambulatory referral to Psychology    See above      10. BMI 45.0-49.9, adult (HCC)  Lipid Panel    TSH with Reflex      11.  Shortness of breath  fluticasone furoate-vilanterol (BREO ELLIPTA) 200-25 MCG/ACT AEPB inhaler    Luba Deleon MD, Pulmonology, [facility]            Plan:      Orders Placed This Encounter   Procedures    Comprehensive Metabolic Panel     Standing Status:   Future     Standing Expiration Date:   2024    Lipid Panel     Standing Status:   Future     Standing Expiration Date:   2024     Order Specific Question:   Is Patient Fasting?/# of Hours     Answer:   10    TSH with Reflex     Standing Status:   Future     Standing Expiration Date:   2024    CBC with Auto Differential     Standing Status:   Future     Standing Expiration Date:   2024    Ambulatory referral to Psychology     Referral Priority:   Routine     Referral Type:   Psychiatric     Referral Reason:   Specialty Services Required     Referred to Provider:   Snow Cruz PSYD     Requested Specialty:   Psychology     Number of Visits Requested:   Selene Hassan MD, OB-GYN, [place]     Referral Priority:   Routine     Referral Type:   Eval and Treat     Referral Reason:   Specialty Services Required     Referred to Provider:   Hyun Mendoza MD     Requested Specialty:   Obstetrics & Gynecology     Number of Visits Requested:   Doris Nunez MD, Pulmonology, [facility]     Referral Priority:   Routine     Referral Type:   Eval and Treat     Referral Reason:   Specialty Services Required     Referred to Provider:   Tsering Simpson MD     Requested Specialty:   Pulmonology     Number of Visits Requested:   1     Orders Placed This Encounter   Medications    Lancets MISC     Si each by Does not apply route 3 times daily DX:E11.65, IDDM (please dispense covered brand)     Dispense:  300 each     Refill:  3    blood glucose test strips (ACCU-CHEK GUIDE) strip     Sig: Test 4x daily     Dispense:  150 each     Refill:  3    melatonin 3 MG TABS tablet     Sig: Take 1 tablet by mouth nightly as needed (as need for insomnia)     Dispense:  20 tablet     Refill:  1    atorvastatin (LIPITOR) 40 MG tablet     Sig: Take 1 tablet by mouth daily     Dispense:  90 tablet     Refill:  1    lisinopril (PRINIVIL;ZESTRIL) 20 MG tablet     Sig: Take 1 tablet by mouth daily     Dispense:  30 tablet     Refill:  12    metFORMIN (GLUCOPHAGE) 1000 MG tablet     Sig: TAKE 1 TABLET BY MOUTH TWICE DAILY WITH MEALS     Dispense:  180 tablet     Refill:  1    pioglitazone (ACTOS) 30 MG tablet     Sig: Take 0.5 tablets by mouth daily     Dispense:  90 tablet     Refill:  1    valACYclovir (VALTREX) 1 g tablet     Sig: Take 1 tablet by mouth daily for 5 days Prn     Dispense:  20 tablet     Refill:  0    fenofibrate (TRIGLIDE) 160 MG tablet     Sig: Take 1 tablet by mouth daily     Dispense:  90 tablet     Refill:  1    ARIPiprazole (ABILIFY) 5 MG tablet     Sig: Take 1 tablet by mouth daily     Dispense:  30 tablet     Refill:  3    fluticasone furoate-vilanterol (BREO ELLIPTA) 200-25 MCG/ACT AEPB inhaler     Sig: Inhale 1 puff into the lungs daily     Dispense:  1 each     Refill:  3       Return in about 2 months (around 4/28/2023). Sooner if needed. Advised to use Lotrimin Ultra for fungal infection on feet. Referral made for Pulmonology and restarted Breo for chronic dyspnea. Discussed risk and benefits of meds. Advised pt to have covid booster and third Twinrix- [facility] Referral to psychotherapy and refilled Abilify due to h/o bipolar and PTSD, currently doing well. Advised to go to ER with severe symptoms such as increased dyspnea, CP, abdominal pain, severe mood change or suicidal thoughts, dizziness or confusion.   Allison Seip, MD

## 2023-03-07 ENCOUNTER — PROCEDURE VISIT (OUTPATIENT)
Dept: PHYSICAL MEDICINE AND REHAB | Age: 48
End: 2023-03-07

## 2023-03-07 DIAGNOSIS — M79.10 MYALGIA: ICD-10-CM

## 2023-03-07 RX ORDER — CYANOCOBALAMIN 1000 UG/ML
1000 INJECTION, SOLUTION INTRAMUSCULAR; SUBCUTANEOUS ONCE
Status: COMPLETED | OUTPATIENT
Start: 2023-03-07 | End: 2023-03-07

## 2023-03-07 RX ORDER — LIDOCAINE HYDROCHLORIDE 10 MG/ML
15 INJECTION, SOLUTION INFILTRATION; PERINEURAL ONCE
Status: COMPLETED | OUTPATIENT
Start: 2023-03-07 | End: 2023-03-07

## 2023-03-07 RX ADMIN — CYANOCOBALAMIN 1000 MCG: 1000 INJECTION, SOLUTION INTRAMUSCULAR; SUBCUTANEOUS at 11:38

## 2023-03-07 RX ADMIN — LIDOCAINE HYDROCHLORIDE 15 ML: 10 INJECTION, SOLUTION INFILTRATION; PERINEURAL at 11:39

## 2023-03-13 ENCOUNTER — OFFICE VISIT (OUTPATIENT)
Dept: BEHAVIORAL/MENTAL HEALTH CLINIC | Age: 48
End: 2023-03-13

## 2023-03-13 DIAGNOSIS — F31.75 BIPOLAR DISORDER, IN PARTIAL REMISSION, MOST RECENT EPISODE DEPRESSED (HCC): Primary | ICD-10-CM

## 2023-03-13 ASSESSMENT — PATIENT HEALTH QUESTIONNAIRE - PHQ9
8. MOVING OR SPEAKING SO SLOWLY THAT OTHER PEOPLE COULD HAVE NOTICED. OR THE OPPOSITE, BEING SO FIGETY OR RESTLESS THAT YOU HAVE BEEN MOVING AROUND A LOT MORE THAN USUAL: 1
7. TROUBLE CONCENTRATING ON THINGS, SUCH AS READING THE NEWSPAPER OR WATCHING TELEVISION: 1
3. TROUBLE FALLING OR STAYING ASLEEP: 2
6. FEELING BAD ABOUT YOURSELF - OR THAT YOU ARE A FAILURE OR HAVE LET YOURSELF OR YOUR FAMILY DOWN: 3
SUM OF ALL RESPONSES TO PHQ QUESTIONS 1-9: 13
SUM OF ALL RESPONSES TO PHQ QUESTIONS 1-9: 14
4. FEELING TIRED OR HAVING LITTLE ENERGY: 2
SUM OF ALL RESPONSES TO PHQ QUESTIONS 1-9: 14
SUM OF ALL RESPONSES TO PHQ QUESTIONS 1-9: 14
10. IF YOU CHECKED OFF ANY PROBLEMS, HOW DIFFICULT HAVE THESE PROBLEMS MADE IT FOR YOU TO DO YOUR WORK, TAKE CARE OF THINGS AT HOME, OR GET ALONG WITH OTHER PEOPLE: 1
2. FEELING DOWN, DEPRESSED OR HOPELESS: 1
1. LITTLE INTEREST OR PLEASURE IN DOING THINGS: 2
9. THOUGHTS THAT YOU WOULD BE BETTER OFF DEAD, OR OF HURTING YOURSELF: 1
5. POOR APPETITE OR OVEREATING: 1
SUM OF ALL RESPONSES TO PHQ9 QUESTIONS 1 & 2: 3

## 2023-03-13 ASSESSMENT — ANXIETY QUESTIONNAIRES
6. BECOMING EASILY ANNOYED OR IRRITABLE: 1
IF YOU CHECKED OFF ANY PROBLEMS ON THIS QUESTIONNAIRE, HOW DIFFICULT HAVE THESE PROBLEMS MADE IT FOR YOU TO DO YOUR WORK, TAKE CARE OF THINGS AT HOME, OR GET ALONG WITH OTHER PEOPLE: SOMEWHAT DIFFICULT
GAD7 TOTAL SCORE: 10
2. NOT BEING ABLE TO STOP OR CONTROL WORRYING: 1
7. FEELING AFRAID AS IF SOMETHING AWFUL MIGHT HAPPEN: 1
5. BEING SO RESTLESS THAT IT IS HARD TO SIT STILL: 1
3. WORRYING TOO MUCH ABOUT DIFFERENT THINGS: 2
1. FEELING NERVOUS, ANXIOUS, OR ON EDGE: 2
4. TROUBLE RELAXING: 2

## 2023-03-13 ASSESSMENT — COLUMBIA-SUICIDE SEVERITY RATING SCALE - C-SSRS
2. HAVE YOU ACTUALLY HAD ANY THOUGHTS OF KILLING YOURSELF?: NO
6. HAVE YOU EVER DONE ANYTHING, STARTED TO DO ANYTHING, OR PREPARED TO DO ANYTHING TO END YOUR LIFE?: YES
1. WITHIN THE PAST MONTH, HAVE YOU WISHED YOU WERE DEAD OR WISHED YOU COULD GO TO SLEEP AND NOT WAKE UP?: YES
7. DID THIS OCCUR IN THE LAST THREE MONTHS: NO

## 2023-03-13 NOTE — PROGRESS NOTES
Behavioral Health Consultation  Tila Terry PsyD, [facility]  Psychologist  3/13/23  3:03 PM EDT      Time spent with Patient: 30 minutes  This is patient's first  [facility] appointment. Reason for Consult:  Bipolar Disorder, PTSD  Referring Provider: Devan Jaimes MD    Patient provided informed consent for the behavioral health program. Discussed with patient model of service to include the limits of confidentiality (i.e. abuse reporting, suicide intervention, etc.) and short-term intervention focused approach vs traditional counseling/psychotherapy. Discussed that this evaluation is not for the purposes of determination of competency, disability determination, custody or parental fitness, presurgical clearance, or for any forensic determination; nor are [facility] services typically sufficient for any court-ordered treatment requirements. Further, it was discussed with patient that specific evaluation of certain conditions (e.g., ADHD, LD, cognitive impairment, memory issues, etc.) may necessitate referral to a specialty mental health provider in the community for formal psychometric/neuropsychological testing and evaluation, which cannot be performed through [facility] services. Patient indicated understanding. Feedback given to PCP. S:  The patient was raised in [place] by her biological mother and grandparents; patient noted her father was not part of her childhood. Patient has 4 half-siblings, and patient is the oldest. Patient denied any history of childhood abuse; however, she noted trauma related to the murder of her mother when patient was 24. 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 Patient has been  twice; first marriage was 25 years and ended with the loss of her  in 2019, and she remarried 2 years ago. Patient has no children. The patient currently resides with her .     Patient reported a history of past outpatient mental health treatment; she last participated in treatment approximately 3 years ago. Patient noted a history of past suicide attempts at age 13; she denied ever receiving inpatient psychiatric treatment. Patient is currently prescribed Abilify by her PCP and noted significant benefit. Patient described their mood as \"a lot better. \" The patient reported their sleep as \"good;\" they sleep approximately 8 hours per night, on average. Patient described her appetite as \"it's been controlled now. \" Family history of mental health issues includes: \"I think my mom used to have depression. \"    Patient reported social use of alcohol. Patient does not use nicotine products. Patient denied using marijuana. The patient denied any other substance use and misuse of prescription medication. There is no known family history of substance abuse. Patient endorses passive morbid ideation but specifically states there is no plan, no intent, and the patient feels safe; verbally inez for safety. Patient denies current SI/HI. Presenting Problem: \"Because I am taking the Abilify, so Dr. Arelis Lopez said I should get a therapist or someone to talk to. \"    Patient noted past diagnoses of PTSD and Bipolar Disorder; noted she has not experienced a manic episode since starting Abilify, 3-4 years ago. Stated she believes symptoms of depression are seasonal and also frequent around anniversary of mother's death (January). Anxiety tends to be related to worry over financial concerns. Denied any current/recent experience of nightmares/flashbacks.      Goal: develop further emotional control and regulation        O:  MSE:    Appearance:  Alert, appropriately dressed, well groomed  Behavior:  Cooperative and Pleasant  Appetite:  abnormal  Sleep disturbance:  Denied  Fatigue:  Yes  Loss of pleasure:  Yes  Impulsive behavior:  No  Speech:  spontaneous, normal rate, and normal volume  Mood:  \"A lot better\"  Affect:  Neutral/Euthymic(normal)  Thought Process:  Goal-Directed  Thought Content:  intact  Associations:  logical connections  Insight:  fair   Judgement:  fair  Orientation:  oriented to person, place, time, and general circumstances  Memory:  recent and remote memory intact  Attention/Concentration:  intact  Morbid ideation:  Yes  Suicide Assessment:  no suicidal ideation      History:    Medications:   Current Outpatient Medications   Medication Sig Dispense Refill    fish oil-omega-3 fatty acids 1000 MG capsule Take 1,000 mg by mouth daily      NYSTATIN IN AQUAPHOR OINTMENT Apply topically 2 times daily      Lancets MISC 1 each by Does not apply route 3 times daily DX:E11.65, IDDM (please dispense covered brand) 300 each 3    blood glucose test strips (ACCU-CHEK GUIDE) strip Test 4x daily 150 each 3    melatonin 3 MG TABS tablet Take 1 tablet by mouth nightly as needed (as need for insomnia) 20 tablet 1    atorvastatin (LIPITOR) 40 MG tablet Take 1 tablet by mouth daily 90 tablet 1    lisinopril (PRINIVIL;ZESTRIL) 20 MG tablet Take 1 tablet by mouth daily 30 tablet 12    metFORMIN (GLUCOPHAGE) 1000 MG tablet TAKE 1 TABLET BY MOUTH TWICE DAILY WITH MEALS 180 tablet 1    pioglitazone (ACTOS) 30 MG tablet Take 0.5 tablets by mouth daily 90 tablet 1    fenofibrate (TRIGLIDE) 160 MG tablet Take 1 tablet by mouth daily 90 tablet 1    ARIPiprazole (ABILIFY) 5 MG tablet Take 1 tablet by mouth daily 30 tablet 3    fluticasone furoate-vilanterol (BREO ELLIPTA) 200-25 MCG/ACT AEPB inhaler Inhale 1 puff into the lungs daily 1 each 3    insulin lispro (HUMALOG) 100 UNIT/ML injection vial Use via pump max 150 units/day patient not responding to NovoLog 50 mL 3    chlorthalidone (HYGROTEN) 15 MG tablet Take 1 tablet by mouth daily 30 tablet 12    docusate sodium (COLACE) 100 MG capsule Take 1 capsule by mouth 2 times daily as needed for Constipation 180 capsule 4    Accu-Chek FastClix Lancets MISC Test 4x daily 150 each 3    vitamin D (ERGOCALCIFEROL) 1.25 MG (36360 UT) CAPS capsule Take 1 capsule by mouth once a week 12 capsule 3    aspirin 81 MG tablet Take 1 tablet by mouth daily 90 tablet 3    Sodium Phosphates (FLEET) 7-19 GM/118ML Place 1 enema rectally every 2 hours as needed (constipation) 4 Bottle 0    Elastic Bandages & Supports (ABDOMINAL BINDER/ELASTIC LARGE) MISC Wear around abdomen or pelvis as needed for comfort. 1 each 1    Blood Glucose Monitoring Suppl (ONE TOUCH ULTRA 2) w/Device KIT USE AS DIRECTED TID  0     No current facility-administered medications for this visit. Social History:   Social History     Socioeconomic History    Marital status:      Spouse name: Not on file    Number of children: Not on file    Years of education: Not on file    Highest education level: Not on file   Occupational History    Occupation: [employer]   Tobacco Use    Smoking status: Never    Smokeless tobacco: Never   Vaping Use    Vaping Use: Never used   Substance and Sexual Activity    Alcohol use: Yes     Comment: socially    Drug use: No    Sexual activity: Yes     Partners: Male     Comment: No BC; primary infertility   Other Topics Concern    Not on file   Social History Narrative    Not on file     Social Determinants of Health     Financial Resource Strain: Low Risk     Difficulty of Paying Living Expenses: Not very hard   Food Insecurity: No Food Insecurity    Worried About Running Out of Food in the Last Year: Never true    [address] in the Last Year: Never true   Transportation Needs: Unknown    Lack of Transportation (Medical): Not on file    Lack of Transportation (Non-Medical):  No   Physical Activity: Inactive    Days of Exercise per Week: 0 days    Minutes of Exercise per Session: 0 min   Stress: Not on file   Social Connections: Not on file   Intimate Partner Violence: Not At Risk    Fear of Current or Ex-Partner: No    Emotionally Abused: No    Physically Abused: No    Sexually Abused: No   Housing Stability: Unknown    Unable to Pay for Housing in the Last Year: Not on file    Number of Places Lived in the Last Year: Not on file    Unstable Housing in the Last Year: No       TOBACCO:   reports that she has never smoked. She has never used smokeless tobacco.  ETOH:   reports current alcohol use. Family History:   Family History   Problem Relation Age of Onset    Diabetes Father     High Blood Pressure Father     Retinal Detachment Father     Cancer Father         skin    Thyroid Disease Mother     Kidney Disease Sister     Inflam Bowel Dis Brother     No Known Problems Sister     Other Brother     No Known Problems Paternal Grandfather     No Known Problems Paternal Grandmother     No Known Problems Maternal Grandmother     No Known Problems Maternal Grandfather     No Known Problems Other     Breast Cancer Maternal Aunt     Colon Cancer Neg Hx     Eclampsia Neg Hx     Hypertension Neg Hx     Ovarian Cancer Neg Hx      Labor Neg Hx     Spont Abortions Neg Hx     Stroke Neg Hx          A:  Administered the PHQ-9, which indicates a self report of moderate depression with some associated symptom distress. Patient was administered the WAYNE-7, which indicates a self report of moderate anxiety. Patient would benefit from [facility] services to increase coping skills to provide symptom management/control/relief. PHQ Scores 3/13/2023 2023 2022 2022 2019 2019 3/4/2019   PHQ2 Score 3 0 0 0 3 - 0   PHQ9 Score 14 0 0 0 21 3 0     Interpretation of Total Score Depression Severity: 1-4 = Minimal depression, 5-9 = Mild depression, 10-14 = Moderate depression, 15-19 = Moderately severe depression, 20-27 = Severe depression      WAYNE 7 SCORE 3/13/2023   WAYNE-7 Total Score 10     Interpretation of WAYNE-7 score: 5-9 = mild anxiety, 10-14 = moderate anxiety, 15+ = severe anxiety. Recommend referral to behavioral health for scores 10 or greater. Diagnosis:    1.  Bipolar disorder, in partial remission, most recent episode depressed ([facility] [address]        R/O PTSD          Diagnosis Date    Abnormal Pap smear of cervix     Anxiety 7/10/2018    Anxiety     ASCUS with positive high risk HPV cervical 12/03/2018    Bilateral carpal tunnel syndrome 5/15/2019    Bipolar disorder, in partial remission, most recent episode mixed ([address] 12/3/2021    Bipolar disorder, in partial remission, most recent episode mixed ([facility] [address] 12/3/2021    Chronic constipation 12/3/2021    Colon polyp     Diabetes type 2, uncontrolled     Essential hypertension 5/29/2018    Excessive daytime sleepiness     Fibromyalgia 5/29/2018    Gastroesophageal reflux disease without esophagitis 12/3/2021    Gastroesophageal reflux disease without esophagitis 12/3/2021    Herpes     HPV in female 12/03/2018    HPV 18    Hyperlipidemia     Obesity     PTSD (post-traumatic stress disorder) 12/3/2021    PTSD (post-traumatic stress disorder) 12/3/2021    Reactive depression 6/5/2019    Sleep apnea     Snoring     Thyroid disease     Type 2 diabetes mellitus with retinopathy, with long-term current use of insulin ([facility] [address] 12/7/2015    Unrefreshed by sleep            Plan:  Return in about 3 weeks (around 4/3/2023). Pt interventions:  Established rapport, Conducted functional assessment, [place]-setting to identify pt's primary goals for [facility] visit / overall health, Supportive techniques, Emphasized self-care as important for managing overall health, and Provided Psychoeducation re: [facility] role in primary care, [facility] services vs traditional counseling/psychotherapy      Pt Behavioral Change Plan:  Follow up as scheduled         Please note this report has been partially produced using speech recognition software and may cause contain errors related to that system including grammar, punctuation, and spelling, as well as words and phrases that may seem inappropriate. If there are questions or concerns please feel free to contact me to clarify.

## 2023-03-16 ENCOUNTER — OFFICE VISIT (OUTPATIENT)
Dept: PRIMARY CARE CLINIC | Age: 48
End: 2023-03-16
Payer: MEDICARE

## 2023-03-16 VITALS
HEART RATE: 85 BPM | DIASTOLIC BLOOD PRESSURE: 72 MMHG | WEIGHT: 274 LBS | OXYGEN SATURATION: 96 % | TEMPERATURE: 98.3 F | SYSTOLIC BLOOD PRESSURE: 124 MMHG | BODY MASS INDEX: 47.03 KG/M2

## 2023-03-16 DIAGNOSIS — R79.89 ELEVATED LFTS: Primary | ICD-10-CM

## 2023-03-16 PROCEDURE — 3078F DIAST BP <80 MM HG: CPT | Performed by: FAMILY MEDICINE

## 2023-03-16 PROCEDURE — G8427 DOCREV CUR MEDS BY ELIG CLIN: HCPCS | Performed by: FAMILY MEDICINE

## 2023-03-16 PROCEDURE — 99213 OFFICE O/P EST LOW 20 MIN: CPT | Performed by: FAMILY MEDICINE

## 2023-03-16 PROCEDURE — G8482 FLU IMMUNIZE ORDER/ADMIN: HCPCS | Performed by: FAMILY MEDICINE

## 2023-03-16 PROCEDURE — 3074F SYST BP LT 130 MM HG: CPT | Performed by: FAMILY MEDICINE

## 2023-03-16 PROCEDURE — G8417 CALC BMI ABV UP PARAM F/U: HCPCS | Performed by: FAMILY MEDICINE

## 2023-03-16 PROCEDURE — 1036F TOBACCO NON-USER: CPT | Performed by: FAMILY MEDICINE

## 2023-03-16 RX ORDER — OMEGA-3/DHA/EPA/FISH OIL 300-1000MG
1000 CAPSULE ORAL DAILY
COMMUNITY
Start: 2020-01-16

## 2023-03-16 NOTE — PROGRESS NOTES
Subjective:      Patient ID: Dmitriy Ann is a 53 y.o. female who presents today for:  Chief Complaint   Patient presents with    Discuss Labs    Follow-up     No new complaints        HPI patient presents for follow-up and to discuss labs. Her hemoglobin A1c is 10.5. This is improved from 9/9/22. She has upcoming appointment with Dr Britta Cabrera next week  H/o fatty liver per patient. Patient states she has a strong family history of liver cancer. She is interested in speaking with a gastroenterologist regarding this. She complains of no abdominal pain, jaundice, change in BMs. OB/GYN appt scheduled for next week. Mood has been good, she is seeing therapist every few weeks and has noted some improvement. No thoughts of hurting self or others. No paranoia, panic, hallucinations or difficulty sleeping.   Past Medical History:   Diagnosis Date    Abnormal Pap smear of cervix     Anxiety 7/10/2018    Anxiety     ASCUS with positive high risk HPV cervical 12/03/2018    Bilateral carpal tunnel syndrome 5/15/2019    Bipolar disorder, in partial remission, most recent episode mixed ([address] 12/3/2021    Bipolar disorder, in partial remission, most recent episode mixed ([address] 12/3/2021    Chronic constipation 12/3/2021    Colon polyp     Diabetes type 2, uncontrolled     Essential hypertension 5/29/2018    Excessive daytime sleepiness     Fibromyalgia 5/29/2018    Gastroesophageal reflux disease without esophagitis 12/3/2021    Gastroesophageal reflux disease without esophagitis 12/3/2021    Herpes     HPV in female 12/03/2018    HPV 18    Hyperlipidemia     Obesity     PTSD (post-traumatic stress disorder) 12/3/2021    PTSD (post-traumatic stress disorder) 12/3/2021    Reactive depression 6/5/2019    Sleep apnea     Snoring     Thyroid disease     Type 2 diabetes mellitus with retinopathy, with long-term current use of insulin ([address] 12/7/2015    Unrefreshed by sleep      Past Surgical History:   Procedure Laterality Date    COLONOSCOPY      COLONOSCOPY N/A 01/27/2022    COLORECTAL CANCER SCREENING, HIGH RISK performed by Conrado Sotelo MD at [facility] [id number]  05/2020    bx negative for dysplasia     Social History     Socioeconomic History    Marital status:      Spouse name: Not on file    Number of children: Not on file    Years of education: Not on file    Highest education level: Not on file   Occupational History    Occupation: [address]   Tobacco Use    Smoking status: Never    Smokeless tobacco: Never   Vaping Use    Vaping Use: Never used   Substance and Sexual Activity    Alcohol use: Yes     Comment: socially    Drug use: No    Sexual activity: Yes     Partners: Male     Comment: No BC; primary infertility   Other Topics Concern    Not on file   Social History Narrative    Not on file     Social Determinants of Health     Financial Resource Strain: Low Risk     Difficulty of Paying Living Expenses: Not very hard   Food Insecurity: No Food Insecurity    Worried About Running Out of Food in the Last Year: Never true    [address] in the Last Year: Never true   Transportation Needs: Unknown    Lack of Transportation (Medical): Not on file    Lack of Transportation (Non-Medical):  No   Physical Activity: Inactive    Days of Exercise per Week: 0 days    Minutes of Exercise per Session: 0 min   Stress: Not on file   Social Connections: Not on file   Intimate Partner Violence: Not At Risk    Fear of Current or Ex-Partner: No    Emotionally Abused: No    Physically Abused: No    Sexually Abused: No   Housing Stability: Unknown    Unable to Pay for Housing in the Last Year: Not on file    Number of Places Lived in the Last Year: Not on file    Unstable Housing in the Last Year: No     Family History   Problem Relation Age of Onset    Diabetes Father     High Blood Pressure Father     Retinal Detachment Father     Cancer Father         skin    Thyroid Disease Mother     Kidney Disease Sister     Inflam Bowel Dis Brother     No Known Problems Sister     Other Brother     No Known Problems Paternal Grandfather     No Known Problems Paternal Grandmother     No Known Problems Maternal Grandmother     No Known Problems Maternal Grandfather     No Known Problems Other     Breast Cancer Maternal Aunt     Colon Cancer Neg Hx     Eclampsia Neg Hx     Hypertension Neg Hx     Ovarian Cancer Neg Hx      Labor Neg Hx     Spont Abortions Neg Hx     Stroke Neg Hx      Allergies   Allergen Reactions    Niacin And Related Swelling and Rash         Review of Systems   Constitutional: Negative. HENT: Negative. Eyes: Negative. Respiratory: Negative. Cardiovascular: Negative. Gastrointestinal: Negative. Genitourinary: Negative. Musculoskeletal: Negative. Neurological: Negative. Psychiatric/Behavioral: Negative. Objective:   /72   Pulse 85   Temp 98.3 °F (36.8 °C) (Temporal)   Wt 274 lb (124.3 kg)   LMP 2023   SpO2 96%   BMI 47.03 kg/m²     Physical Exam  Vitals reviewed. Constitutional:       General: She is not in acute distress. Appearance: Normal appearance. She is obese. She is not toxic-appearing. HENT:      Head: Normocephalic. Mouth/Throat:      Mouth: Mucous membranes are moist.      Pharynx: Oropharynx is clear. No oropharyngeal exudate or posterior oropharyngeal erythema. Eyes:      Extraocular Movements: Extraocular movements intact. Conjunctiva/sclera: Conjunctivae normal.      Pupils: Pupils are equal, round, and reactive to light. Cardiovascular:      Rate and Rhythm: Normal rate and regular rhythm. Heart sounds: Normal heart sounds. Pulmonary:      Effort: Pulmonary effort is normal. No respiratory distress. Breath sounds: Normal breath sounds. No wheezing. Abdominal:      General: Abdomen is flat. Bowel sounds are normal. There is no distension. Palpations: Abdomen is soft. There is no mass. Tenderness: There is no abdominal tenderness. There is no guarding. Musculoskeletal:         General: Normal range of motion. Cervical back: Normal range of motion and neck supple. Right lower leg: No edema. Left lower leg: No edema. Lymphadenopathy:      Cervical: No cervical adenopathy. Skin:     General: Skin is warm and dry. Findings: No rash. Neurological:      General: No focal deficit present. Mental Status: She is alert and oriented to person, place, and time. Cranial Nerves: No cranial nerve deficit. Sensory: No sensory deficit. Motor: No weakness. Gait: Gait normal.   Psychiatric:         Mood and Affect: Mood normal.         Behavior: Behavior normal.         Thought Content: Thought content normal.         Judgment: Judgment normal.       Assessment:       Diagnosis Orders   1. Elevated LFTs  Ambulatory referral to Gastroenterology            Plan:      Orders Placed This Encounter   Procedures    Ambulatory referral to Gastroenterology     Referral Priority:   Routine     Referral Type:   Eval and Treat     Referral Reason:   Specialty Services Required     Referred to Provider:   Hamida Hunter MD     Requested Specialty:   Gastroenterology     Number of Visits Requested:   1     No orders of the defined types were placed in this encounter. Return in about 2 months (around 5/16/2023). Return to clinic sooner if needed. Due to family history and elevated LFTs patient request referral to GI. This was provided today. Patient has upcoming Pap smear, encouraged patient to make this appointment due to history of abnormal Pap. Encouraged patient to see Dr. Deepali Rivas for her diabetes, there has been some improvement but hemoglobin A1c is still significantly elevated. Recommended diabetic retinal exam-plans to schedule. I encouraged patient to eat a healthy diet and get regular exercise.   I recommended nutritionist and diabetic training, patient declines at this time. Advised patient to go to the emergency room with chest pain, difficulty breathing, confusion, dizziness, abdominal pain, severe mood change, thoughts of hurting self or others, or other severe symptoms.       Erin Peralta MD

## 2023-03-17 ASSESSMENT — ENCOUNTER SYMPTOMS
GASTROINTESTINAL NEGATIVE: 1
EYES NEGATIVE: 1
RESPIRATORY NEGATIVE: 1

## 2023-03-22 ENCOUNTER — OFFICE VISIT (OUTPATIENT)
Dept: OBGYN CLINIC | Age: 48
End: 2023-03-22

## 2023-03-22 VITALS
HEIGHT: 64 IN | DIASTOLIC BLOOD PRESSURE: 82 MMHG | WEIGHT: 282 LBS | SYSTOLIC BLOOD PRESSURE: 140 MMHG | BODY MASS INDEX: 48.14 KG/M2

## 2023-03-22 DIAGNOSIS — Z87.42 H/O ABNORMAL CERVICAL PAPANICOLAOU SMEAR: ICD-10-CM

## 2023-03-22 DIAGNOSIS — Z12.4 ENCOUNTER FOR PAPANICOLAOU SMEAR OF CERVIX WITH HUMAN PAPILLOMA VIRUS (HPV) DNA COTESTING: ICD-10-CM

## 2023-03-22 DIAGNOSIS — N95.1 PERIMENOPAUSE: ICD-10-CM

## 2023-03-22 DIAGNOSIS — Z12.31 BREAST CANCER SCREENING BY MAMMOGRAM: ICD-10-CM

## 2023-03-22 DIAGNOSIS — Z01.419 ENCOUNTER FOR ANNUAL ROUTINE GYNECOLOGICAL EXAMINATION: Primary | ICD-10-CM

## 2023-03-22 DIAGNOSIS — Z11.3 SCREENING EXAMINATION FOR STD (SEXUALLY TRANSMITTED DISEASE): ICD-10-CM

## 2023-03-22 DIAGNOSIS — B97.7 HPV IN FEMALE: ICD-10-CM

## 2023-03-22 RX ORDER — VALACYCLOVIR HYDROCHLORIDE 1 G/1
TABLET, FILM COATED ORAL
COMMUNITY
Start: 2023-03-12

## 2023-03-22 ASSESSMENT — ENCOUNTER SYMPTOMS
EYES NEGATIVE: 1
NAUSEA: 0
CONSTIPATION: 0
ABDOMINAL PAIN: 0
ANAL BLEEDING: 0
ABDOMINAL DISTENTION: 0
RECTAL PAIN: 0
BLOOD IN STOOL: 0
RESPIRATORY NEGATIVE: 1
ALLERGIC/IMMUNOLOGIC NEGATIVE: 1
VOMITING: 0
DIARRHEA: 0

## 2023-03-22 ASSESSMENT — VISUAL ACUITY: OU: 1

## 2023-03-22 NOTE — PROGRESS NOTES
Subjective:      Patient ID: Joshua Welch is a 53 y.o. female    Annual exam. New patient. Reviewed medical, surgical, social and family history. Also reviewed current medications and allergies. No GYN complaints. Normal cycles. Pap performed ( last pap ASCUS/HPV 18; bx negative 2019 ). Screening mammogram ordered. STD screening offered. Monthly SBE encouraged. F/U annual or prn. Pt also wished to discuss irregular cycles and previous gardisil. States she has missed several cycles this year. States cycles are normal when she has them. Denies prolonged or heavy bleeding. No IM or PCB. Last cycle 2/22/23. Discussed perimenopausal bleeding patterns and abnormal parameters. Occasional HF and NS. Discussed OTC meds she can use for sx relief ( Estroven, Soy, Flaxseed ). Keep bleeding calendar. Also discussed abnormal paps and HPV. H/O ASCUS/HPV 18 in 2018 and 2020. No pap since that time. Asked about finishing Gardasil immunizations. Explained outside of age range and does not TREAT HPV. Encouraged compliance with paps based on hx.       Vitals:  BP (!) 140/82   Ht 5' 4\" (1.626 m)   Wt 282 lb (127.9 kg)   LMP 02/22/2023   BMI 48.41 kg/m²   Past Medical History:   Diagnosis Date    Abnormal Pap smear of cervix     Anxiety 07/10/2018    Anxiety     ASCUS with positive high risk HPV cervical 12/03/2018    Bilateral carpal tunnel syndrome 05/15/2019    Bipolar disorder, in partial remission, most recent episode mixed ([facility] [address] 12/03/2021    Bipolar disorder, in partial remission, most recent episode mixed ([address] 12/03/2021    Chronic constipation 12/03/2021    Colon polyp     Diabetes type 2, uncontrolled     Essential hypertension 05/29/2018    Excessive daytime sleepiness     Fibromyalgia 05/29/2018    Gastroesophageal reflux disease without esophagitis 12/03/2021    Gastroesophageal reflux disease without esophagitis 12/03/2021    Herpes     HPV in female 12/03/2018    HPV 18

## (undated) DEVICE — Device: Brand: ENDO SMARTCAP

## (undated) DEVICE — BRUSH ENDO CLN L90.5IN SHTH DIA1.7MM BRIST DIA5-7MM 2-6MM

## (undated) DEVICE — TUBING, SUCTION, 1/4" X 10', STRAIGHT: Brand: MEDLINE

## (undated) DEVICE — GLOVE ORANGE PI 8   MSG9080

## (undated) DEVICE — TUBE SET 96 MM 64 MM H2O PERISTALTIC STD AUX CHANNEL

## (undated) DEVICE — SINGLE PORT MANIFOLD: Brand: NEPTUNE 2